# Patient Record
Sex: FEMALE | Race: WHITE | NOT HISPANIC OR LATINO | Employment: FULL TIME | URBAN - METROPOLITAN AREA
[De-identification: names, ages, dates, MRNs, and addresses within clinical notes are randomized per-mention and may not be internally consistent; named-entity substitution may affect disease eponyms.]

---

## 2018-05-17 RX ORDER — OMEPRAZOLE 40 MG/1
40 CAPSULE, DELAYED RELEASE ORAL DAILY
COMMUNITY
End: 2018-05-21 | Stop reason: ALTCHOICE

## 2018-05-17 RX ORDER — SPIRONOLACTONE 100 MG/1
100 TABLET, FILM COATED ORAL DAILY
COMMUNITY
End: 2018-05-21 | Stop reason: SDUPTHER

## 2018-05-17 RX ORDER — ESCITALOPRAM OXALATE 10 MG/1
10 TABLET ORAL DAILY
COMMUNITY
End: 2018-05-21 | Stop reason: SDUPTHER

## 2018-05-17 RX ORDER — VALSARTAN AND HYDROCHLOROTHIAZIDE 320; 25 MG/1; MG/1
1 TABLET, FILM COATED ORAL DAILY
COMMUNITY
End: 2018-05-21 | Stop reason: SDUPTHER

## 2018-05-17 RX ORDER — ZOLPIDEM TARTRATE 10 MG/1
5 TABLET ORAL NIGHTLY PRN
COMMUNITY
End: 2018-08-14 | Stop reason: SDUPTHER

## 2018-05-17 RX ORDER — PROGESTERONE 200 MG/1
200 CAPSULE ORAL DAILY
COMMUNITY
End: 2019-01-15

## 2018-05-17 RX ORDER — CETIRIZINE HYDROCHLORIDE 10 MG/1
10 TABLET ORAL DAILY
COMMUNITY

## 2018-05-21 ENCOUNTER — OFFICE VISIT (OUTPATIENT)
Dept: INTERNAL MEDICINE | Facility: CLINIC | Age: 63
End: 2018-05-21
Payer: COMMERCIAL

## 2018-05-21 VITALS
HEART RATE: 78 BPM | TEMPERATURE: 98 F | HEIGHT: 64 IN | BODY MASS INDEX: 31.92 KG/M2 | WEIGHT: 187 LBS | SYSTOLIC BLOOD PRESSURE: 146 MMHG | OXYGEN SATURATION: 98 % | DIASTOLIC BLOOD PRESSURE: 82 MMHG

## 2018-05-21 DIAGNOSIS — G47.00 INSOMNIA, UNSPECIFIED TYPE: ICD-10-CM

## 2018-05-21 DIAGNOSIS — M54.50 LEFT-SIDED LOW BACK PAIN WITHOUT SCIATICA, UNSPECIFIED CHRONICITY: ICD-10-CM

## 2018-05-21 DIAGNOSIS — F32.A DEPRESSION, UNSPECIFIED DEPRESSION TYPE: ICD-10-CM

## 2018-05-21 DIAGNOSIS — M10.9 ACUTE GOUT INVOLVING TOE OF RIGHT FOOT, UNSPECIFIED CAUSE: ICD-10-CM

## 2018-05-21 DIAGNOSIS — K21.9 GASTROESOPHAGEAL REFLUX DISEASE WITHOUT ESOPHAGITIS: ICD-10-CM

## 2018-05-21 DIAGNOSIS — Z11.59 NEED FOR HEPATITIS C SCREENING TEST: ICD-10-CM

## 2018-05-21 DIAGNOSIS — I10 ESSENTIAL HYPERTENSION: Primary | ICD-10-CM

## 2018-05-21 PROBLEM — E87.5 HYPERKALEMIA: Status: RESOLVED | Noted: 2018-05-21 | Resolved: 2018-05-21

## 2018-05-21 PROCEDURE — 96372 THER/PROPH/DIAG INJ SC/IM: CPT | Mod: ,,, | Performed by: INTERNAL MEDICINE

## 2018-05-21 PROCEDURE — 99214 OFFICE O/P EST MOD 30 MIN: CPT | Mod: 25,,, | Performed by: INTERNAL MEDICINE

## 2018-05-21 RX ORDER — METHYLPREDNISOLONE ACETATE 80 MG/ML
80 INJECTION, SUSPENSION INTRA-ARTICULAR; INTRALESIONAL; INTRAMUSCULAR; SOFT TISSUE
Status: COMPLETED | OUTPATIENT
Start: 2018-05-21 | End: 2018-05-21

## 2018-05-21 RX ORDER — VALSARTAN AND HYDROCHLOROTHIAZIDE 320; 25 MG/1; MG/1
1 TABLET, FILM COATED ORAL DAILY
Qty: 90 TABLET | Refills: 1 | Status: SHIPPED | OUTPATIENT
Start: 2018-05-21 | End: 2018-08-14 | Stop reason: SDUPTHER

## 2018-05-21 RX ORDER — AMLODIPINE BESYLATE 5 MG/1
5 TABLET ORAL DAILY
Qty: 90 TABLET | Refills: 1 | Status: SHIPPED | OUTPATIENT
Start: 2018-05-21 | End: 2018-06-12 | Stop reason: SDUPTHER

## 2018-05-21 RX ORDER — NAPROXEN 500 MG/1
500 TABLET ORAL 2 TIMES DAILY
Qty: 60 TABLET | Refills: 1 | Status: SHIPPED | OUTPATIENT
Start: 2018-05-21 | End: 2018-10-16

## 2018-05-21 RX ORDER — ESCITALOPRAM OXALATE 10 MG/1
10 TABLET ORAL DAILY
Qty: 90 TABLET | Refills: 1 | Status: SHIPPED | OUTPATIENT
Start: 2018-05-21 | End: 2018-08-14 | Stop reason: SDUPTHER

## 2018-05-21 RX ORDER — SPIRONOLACTONE 100 MG/1
100 TABLET, FILM COATED ORAL DAILY
Qty: 90 TABLET | Refills: 1 | Status: SHIPPED | OUTPATIENT
Start: 2018-05-21 | End: 2018-06-12

## 2018-05-21 RX ORDER — COLCHICINE 0.6 MG/1
TABLET ORAL
Qty: 30 TABLET | Refills: 1 | Status: SHIPPED | OUTPATIENT
Start: 2018-05-21 | End: 2018-08-14

## 2018-05-21 RX ADMIN — METHYLPREDNISOLONE ACETATE 80 MG: 80 INJECTION, SUSPENSION INTRA-ARTICULAR; INTRALESIONAL; INTRAMUSCULAR; SOFT TISSUE at 02:05

## 2018-05-21 NOTE — PATIENT INSTRUCTIONS
Gout    Gout is an inflammation of a joint due to a build-up of gout crystals in the joint fluid. This occurs when there is an excess of uric acid (a normal waste product) in the body. Uric acid builds up in the body when the kidneys are unable to filter enough of it from the blood. This may occur with age. It is also associated with kidney disease. Gout occurs more often in people with obesity, diabetes, high blood pressure, or high levels of fats in the blood. It may run in families. Gout tends to come and go. A flare up of gout is called an attack. Drinking alcohol or eating certain foods (such as shellfish or foods with additives such as high-fructose corn syrup) may increase uric acid levels in the blood and cause a gout attack.  During a gout attack, the affected joint may become a hot, red, swollen and painful. If you have had one attack of gout, you are likely to have another. An attack of gout can be treated with medicine. If these attacks become frequent, a daily medicine may be prescribed to help the kidneys remove uric acid from the body.  Home care  During a gout attack:  · Rest painful joints. If gout affects the joints of your foot or leg, you may want to use crutches for the first few days to keep from bearing weight on the affected joint.  · When sitting or lying down, raise the painful joint to a level higher than your heart.  · Apply an ice pack (ice cubes in a plastic bag wrapped in a thin towel) over the injured area for 20 minutes every 1 to 2 hours the first day for pain relief. Continue this 3 to 4 times a day for swelling and pain.  · Avoid alcohol and foods listed below (see Preventing attacks) during a gout attack. Drink extra fluid to help flush the uric acid through your kidneys.  · If you were prescribed a medicine to treat gout, take it as your healthcare provider has instructed. Don't skip doses.  · Take anti-inflammatory medicine as directed.   · If pain medicines have been  prescribed, take them exactly as directed.    Preventing attacks  · Minimize or avoid alcohol use. Excess alcohol intake can cause a gout attack.  · Limit these foods and beverages:  ¨ Organ meats, such as kidneys and liver  ¨ Certain seafoods (anchovies, sardines, shrimp, scallops, herring, mackerel)  ¨ Wild game, meat extracts and meat gravies  ¨ Foods and beverages sweetened with high-fructose corn syrup, such as sodas  · Eat a healthy diet including low-fat and nonfat dairy, whole grains, and vegetables.  · If you are overweight, talk to your healthcare provider about a weight reduction plan. Avoid fasting or extreme low calorie diets (less than 900 calories per day). This will increase uric acid levels in the body.  · If you have diabetes or high blood pressure, work with your doctor to manage these conditions.  · Protect the joint from injury. Trauma can trigger a gout attack.  Follow-up care  Follow up with your healthcare provider, or as advised.   When to seek medical advice  Call your healthcare provider if you have any of the following:  · Fever over 100.4°F (38.ºC) with worsening joint pain  · Increasing redness around the joint  · Pain developing in another joint  · Repeated vomiting, abdominal pain, or blood in the vomit or stool (black or red color)  Date Last Reviewed: 3/1/2017  © 3951-1643 [employer]. [address] [place] [address], [place] [address]. All rights reserved. This information is not intended as a substitute for professional medical care. Always follow your healthcare professional's instructions.        Treating Gout Attacks     Raising the joint above the level of your heart can help reduce gout symptoms.     Gout is a disease that affects the joints. It is caused by excess uric acid in your blood stream that may lead to crystals forming in your joints. Left untreated, it can lead to painful foot and joint deformities and even kidney problems. But, by treating gout early, you can  relieve pain and help prevent future problems. Gout can usually be treated with medication and proper diet. In severe cases, surgery may be needed.  Gout attacks are painful and often happen more than once. Taking medications may reduce pain and prevent attacks in the future. There are also some things you can do at home to relieve symptoms.  Medications for gout  Your healthcare provider may prescribe a daily medication to reduce levels of uric acid. Reducing your uric acid levels may help prevent gout attacks. Allopurinol is one commonly used medication taken daily to reduce uric acid levels. Other medications can help relieve pain and swelling during an acute attack. Medicines such as NSAIDs (nonsteroidal anti-inflammatory medicines), steroids, and colchicine may be prescribed for intermittent use to relieve an acute gout attack. Be sure to take your medication as directed.  What you can do  Below are some things you can do at home to relieve gout symptoms. Your healthcare provider may have other tips.  · Rest the painful joint as much as you can.  · Raise the painful joint so it is at a level higher than your heart.  · Use ice for 10 minutes every 1-2 hours as possible.  How can I prevent gout?  With a little effort, you may be able to prevent gout attacks in the future. Here are some things you can do:  · Avoid foods high in purines  ¨ Certain meats (red meat, processed meat, turkey)  ¨ Organ meats (kidney, liver, sweetbread)  ¨ Shellfish (lobster, crab, shrimp, scallop, mussel)  ¨ Certain fish (anchovy, sardine, herring, mackerel)  · Take any medications prescribed by your healthcare provider.  · Lose weight if you need to.  · Reduce high fructose corn syrup in meals and drinks.  · Reduce or eliminate consumption of alcohol, particularly beer, but also red wine and spirits.  · Control blood pressure, diabetes, and cholesterol.  · Drink plenty of water to help flush uric acid from your body.  Date Last  Reviewed: 2/1/2016  © 2041-4886 [employer]. [address] [place] [address], [place] [address]. All rights reserved. This information is not intended as a substitute for professional medical care. Always follow your healthcare professional's instructions.        Gout Diet  Gout is a painful condition caused by an excess of uric acid, a waste product made by the body. Uric acid forms crystals that collect in the joints. The immune response to these crystals brings on symptoms of joint pain and swelling. This is called a gout attack. Often, medications and diet changes are combined to manage gout. Below are some guidelines for changing your diet to help you manage gout and prevent attacks. Your health care provider will help you determine the best eating plan for you.     Eating to manage gout  Weight loss for those who are overweight may help reduce gout attacks.  Eat less of these foods  Eating too many foods containing purines may raise the levels of uric acid in your body. This raises your risk for a gout attack. Try to limit these foods and drinks:  · Alcohol, such as beer and red wine. You may be told to avoid alcohol completely.  · Soft drinks that contain sugar or high fructose corn syrup  · Certain fish, including anchovies, sardines, fish eggs, and herring  · Shellfish  · Certain meats, such as red meat, hot dogs, luncheon meats, and turkey  · Organ meats, such as liver, kidneys, and sweetbreads  · Legumes, such as dried beans and peas  · Other high fat foods such as gravy, whole milk, and high fat cheeses  · Vegetables such as asparagus, cauliflower, spinach, and mushrooms used to be thought to contribute to an increased risk for a gout attack, but recent studies show that high purine vegetables don't increase the risk for a gout attack.  Eat more of these foods  Other foods may be helpful for people with gout. Add some of these foods to your diet:  · Cherries contain chemicals that may lower uric  acid.  · Omega fatty acids. These are found in some fatty fish such as salmon, certain oils (flax, olive, or nut), and nuts themselves. Omega fatty acids may help prevent inflammation due to gout.  · Dairy products that are low-fat or fat-free, such as cheese and yogurt  · Complex carbohydrate foods, including whole grains, brown rice, oats, and beans  · Coffee, in moderation  · Water, approximately 64 ounces per day  Follow-up care  Follow up with your healthcare provider as advised.  When to seek medical advice  Call your healthcare provider right away if any of these occur:  · Return of gout symptoms, usually at night:  · Severe pain, swelling, and heat in a joint, especially the base of the big toe  · Affected joint is hard to move  · Skin of the affected joint is purple or red  · Fever of 100.4°F (38°C) or higher  · Pain that doesn't get better even with prescribed medicine   Date Last Reviewed: 1/12/2016  © 3407-2105 [employer] [address] [place] [address], [place] [address]. All rights reserved. This information is not intended as a substitute for professional medical care. Always follow your healthcare professional's instructions.

## 2018-05-21 NOTE — PROGRESS NOTES
"Subjective:       Patient ID: Kayla Piper is a 62 y.o. female.    Chief Complaint: Back Pain; Hypertension; Foot Pain (great toe on right foot hurts and swells up); and Hand Pain (bilateral hand pain)    Here to establish care and follow up at new location.  Recently had some labs done with Dr. Keene.      Back Pain   This is a new problem. The current episode started more than 1 month ago. The problem has been gradually worsening since onset. The pain is present in the lumbar spine. The quality of the pain is described as burning. Radiates to: left hip. The pain is at a severity of 8/10. The symptoms are aggravated by standing, twisting and bending. Pertinent negatives include no abdominal pain, bladder incontinence, bowel incontinence, chest pain, dysuria, fever, headaches, leg pain, numbness, paresthesias, pelvic pain, perianal numbness, tingling, weakness or weight loss. (Sometimes gets a "catch" in her back which almost makes her go to her knees.) Risk factors: prior h/o surgery on neck for DDD. She has tried NSAIDs (narcotics left over from last surgery) for the symptoms. The treatment provided moderate (takes meds mostly to help her sleep) relief.   Hypertension   This is a chronic problem. The problem is uncontrolled (monitors at home and typically upper 130s-140s there also). Associated symptoms include neck pain. Pertinent negatives include no chest pain, headaches, palpitations or shortness of breath. Agents associated with hypertension include estrogens and thyroid hormones. Risk factors for coronary artery disease include dyslipidemia, obesity and post-menopausal state. Past treatments include diuretics and angiotensin blockers. Identifiable causes of hypertension include a thyroid problem.   Foot Pain   This is a new (right great toe) problem. The current episode started 1 to 4 weeks ago. The problem has been waxing and waning. Associated symptoms include arthralgias (hand pain), joint swelling, " myalgias and neck pain. Pertinent negatives include no abdominal pain, chest pain, chills, congestion, coughing, fatigue, fever, headaches, nausea, numbness, rash, vomiting or weakness. Nothing aggravates the symptoms. She has tried nothing for the symptoms. The treatment provided mild relief.   Thyroid Problem   Presents for follow-up visit. Symptoms include weight gain (10-15 pounds in the last year). Patient reports no anxiety, cold intolerance, constipation, depressed mood (controlled with meds), diarrhea, dry skin, fatigue, hair loss, heat intolerance, menstrual problem, nail problem, palpitations, tremors or weight loss.     Review of Systems   Constitutional: Positive for weight gain (10-15 pounds in the last year). Negative for chills, fatigue, fever, unexpected weight change and weight loss.   HENT: Negative for congestion, hearing loss, postnasal drip, rhinorrhea, trouble swallowing and voice change.    Eyes: Negative for photophobia and visual disturbance.   Respiratory: Negative for apnea, cough, choking, chest tightness, shortness of breath and wheezing.    Cardiovascular: Negative for chest pain, palpitations and leg swelling.   Gastrointestinal: Negative for abdominal pain, blood in stool, bowel incontinence, constipation, diarrhea, nausea, rectal pain and vomiting.   Endocrine: Negative for cold intolerance, heat intolerance, polydipsia and polyuria.   Genitourinary: Negative for bladder incontinence, decreased urine volume, difficulty urinating, dysuria, frequency (s), genital sores, hematuria, menstrual problem, pelvic pain, urgency, vaginal bleeding and vaginal discharge.   Musculoskeletal: Positive for arthralgias (hand pain), back pain, joint swelling, myalgias, neck pain and neck stiffness. Negative for gait problem.   Skin: Negative for color change, rash and wound.   Allergic/Immunologic: Negative for environmental allergies and food allergies.   Neurological: Negative for dizziness,  tingling, tremors, seizures, syncope, facial asymmetry, speech difficulty, weakness, light-headedness, numbness, headaches and paresthesias.   Hematological: Negative for adenopathy. Does not bruise/bleed easily.   Psychiatric/Behavioral: Positive for sleep disturbance. Negative for confusion, hallucinations and suicidal ideas. The patient is not nervous/anxious.        Past Medical History:   Diagnosis Date    DDD (degenerative disc disease), cervical     Depression     Hyperkalemia     Hypertension     Insomnia     Rhinitis     Synovial cyst of popliteal space       Past Surgical History:   Procedure Laterality Date    ANTERIOR CERVICAL DISCECTOMY W/ FUSION      APPENDECTOMY      COLONOSCOPY  2013    KNEE SURGERY Right        Family History   Problem Relation Age of Onset    Hypertension Mother     Coronary artery disease Mother     Lung cancer Father     Hypertension Sister        Social History     Social History    Marital status:      Spouse name: N/A    Number of children: N/A    Years of education: N/A     Occupational History          Social History Main Topics    Smoking status: Former Smoker    Smokeless tobacco: Never Used    Alcohol use Yes      Comment: occasional    Drug use: No    Sexual activity: Yes     Partners: Male     Birth control/ protection: None      Comment:      Other Topics Concern    None     Social History Narrative    Live with        Current Outpatient Prescriptions   Medication Sig Dispense Refill    cetirizine (ZYRTEC) 10 MG tablet Take 10 mg by mouth once daily.      escitalopram oxalate (LEXAPRO) 10 MG tablet Take 1 tablet (10 mg total) by mouth once daily. 90 tablet 1    progesterone (PROMETRIUM) 200 MG capsule Take 200 mg by mouth once daily.      spironolactone (ALDACTONE) 100 MG tablet Take 1 tablet (100 mg total) by mouth once daily. 90 tablet 1    thyroid, pork, (NATURE-THROID) 65 mg Tab Take 1/2 tablet 2  "times daily 90 tablet 1    valsartan-hydrochlorothiazide (DIOVAN-HCT) 320-25 mg per tablet Take 1 tablet by mouth once daily. 90 tablet 1    zolpidem (AMBIEN) 10 mg Tab Take 5 mg by mouth nightly as needed.      amLODIPine (NORVASC) 5 MG tablet Take 1 tablet (5 mg total) by mouth once daily. 90 tablet 1    colchicine 0.6 mg tablet Take 2 tablets initially followed by one tablet two hors later and then one tablet daily 30 tablet 1    naproxen (NAPROSYN) 500 MG tablet Take 1 tablet (500 mg total) by mouth 2 (two) times daily. 60 tablet 1    ranitidine (ZANTAC) 150 MG tablet Take 1 tablet (150 mg total) by mouth 2 (two) times daily. 60 tablet 1     Current Facility-Administered Medications   Medication Dose Route Frequency Provider Last Rate Last Dose    methylPREDNISolone acetate injection 80 mg  80 mg Intramuscular 1 time in Clinic/HOD Theron Jimenez Jr., MD           Review of patient's allergies indicates:  No Known Allergies  Objective:    HPI     Foot Pain    Additional comments: great toe on right foot hurts and swells up           Hand Pain    Additional comments: bilateral hand pain       Last edited by Devendra Aguirre MA on 5/21/2018  2:14 PM. (History)      Blood pressure (!) 146/82, pulse 78, temperature 97.8 °F (36.6 °C), temperature source Temporal, height 5' 4" (1.626 m), weight 84.8 kg (187 lb), SpO2 98 %. Body mass index is 32.1 kg/m².   Physical Exam   Constitutional: She appears well-developed. No distress.   Obese     HENT:   Nose: Nose normal.   Mouth/Throat: Oropharynx is clear and moist.   Eyes: Conjunctivae are normal. Right eye exhibits no discharge. Left eye exhibits no discharge. No scleral icterus.   Neck: Carotid bruit is not present.   Cardiovascular: Normal rate, regular rhythm and normal heart sounds.    No murmur heard.  Pulses:       Dorsalis pedis pulses are 2+ on the right side, and 2+ on the left side.   Pulmonary/Chest: Effort normal and breath sounds normal. No " respiratory distress. She has no decreased breath sounds. She has no wheezes. She has no rhonchi. She has no rales.   Abdominal: Soft. She exhibits no distension. There is no tenderness. There is no rebound and no guarding.   Musculoskeletal: She exhibits no edema.        Right foot: There is tenderness (first MTP joint with mild erythema).   Neurological: She is alert. She displays no tremor.   Skin: Skin is warm and dry.   Psychiatric: She has a normal mood and affect. Her speech is normal.   Nursing note and vitals reviewed.          Assessment:       1. Essential hypertension    2. Acute gout involving toe of right foot, unspecified cause    3. Depression, unspecified depression type    4. Insomnia, unspecified type    5. Left-sided low back pain without sciatica, unspecified chronicity    6. Gastroesophageal reflux disease without esophagitis    7. Need for hepatitis C screening test        Plan:       Kayla was seen today for back pain, hypertension, foot pain and hand pain.    Diagnoses and all orders for this visit:    Essential hypertension  -     spironolactone (ALDACTONE) 100 MG tablet; Take 1 tablet (100 mg total) by mouth once daily.  -     valsartan-hydrochlorothiazide (DIOVAN-HCT) 320-25 mg per tablet; Take 1 tablet by mouth once daily.  -     amLODIPine (NORVASC) 5 MG tablet; Take 1 tablet (5 mg total) by mouth once daily.    Acute gout involving toe of right foot, unspecified cause  -     Uric acid; Future  -     colchicine 0.6 mg tablet; Take 2 tablets initially followed by one tablet two hors later and then one tablet daily  -     methylPREDNISolone acetate injection 80 mg; Inject 1 mL (80 mg total) into the muscle one time.  -     naproxen (NAPROSYN) 500 MG tablet; Take 1 tablet (500 mg total) by mouth 2 (two) times daily.  -     Uric acid    Depression, unspecified depression type  -     escitalopram oxalate (LEXAPRO) 10 MG tablet; Take 1 tablet (10 mg total) by mouth once daily.    Insomnia,  unspecified type    Left-sided low back pain without sciatica, unspecified chronicity  Comments:  She is followed by several of the doctors at [facility].  Recommend she make f/u with them for further evaluation.     Gastroesophageal reflux disease without esophagitis  Comments:  Hasn't tried H2 blocker recently;  try for a month.  Orders:  -     ranitidine (ZANTAC) 150 MG tablet; Take 1 tablet (150 mg total) by mouth 2 (two) times daily.    Need for hepatitis C screening test  -     Hepatitis C antibody; Future  -     Hepatitis C antibody    Other orders  -     thyroid, pork, (NATURE-THROID) 65 mg Tab; Take 1/2 tablet 2 times daily

## 2018-05-23 ENCOUNTER — TELEPHONE (OUTPATIENT)
Dept: INTERNAL MEDICINE | Facility: CLINIC | Age: 63
End: 2018-05-23

## 2018-05-23 DIAGNOSIS — M10.9 ACUTE GOUT INVOLVING TOE OF RIGHT FOOT, UNSPECIFIED CAUSE: Primary | ICD-10-CM

## 2018-05-23 LAB
HCV AB S/CO SERPL IA: <0.1 S/CO RATIO (ref 0–0.9)
URATE SERPL-MCNC: 9.7 MG/DL (ref 2.5–7.1)

## 2018-05-23 RX ORDER — ALLOPURINOL 300 MG/1
300 TABLET ORAL DAILY
Qty: 90 TABLET | Refills: 1 | Status: SHIPPED | OUTPATIENT
Start: 2018-05-23 | End: 2018-08-14 | Stop reason: SDUPTHER

## 2018-05-23 NOTE — TELEPHONE ENCOUNTER
----- Message from Theron Jimenez Jr., MD sent at 5/23/2018  4:24 PM CDT -----  Please call the patient regarding her abnormal result.  Labs c/w gout as I suspected.  There is a medication to reduce the risk of future flares but we shouldn't start it during an acute flare.  I am going to send the Rx to pharmacy; start once pain resolves.

## 2018-06-12 ENCOUNTER — OFFICE VISIT (OUTPATIENT)
Dept: INTERNAL MEDICINE | Facility: CLINIC | Age: 63
End: 2018-06-12
Payer: COMMERCIAL

## 2018-06-12 VITALS
TEMPERATURE: 98 F | DIASTOLIC BLOOD PRESSURE: 80 MMHG | SYSTOLIC BLOOD PRESSURE: 140 MMHG | WEIGHT: 186 LBS | HEART RATE: 75 BPM | OXYGEN SATURATION: 98 % | HEIGHT: 64 IN | BODY MASS INDEX: 31.76 KG/M2

## 2018-06-12 DIAGNOSIS — N28.9 ACUTE RENAL INSUFFICIENCY: Primary | ICD-10-CM

## 2018-06-12 DIAGNOSIS — I10 ESSENTIAL HYPERTENSION: ICD-10-CM

## 2018-06-12 PROCEDURE — 3079F DIAST BP 80-89 MM HG: CPT | Mod: ,,, | Performed by: INTERNAL MEDICINE

## 2018-06-12 PROCEDURE — 3008F BODY MASS INDEX DOCD: CPT | Mod: ,,, | Performed by: INTERNAL MEDICINE

## 2018-06-12 PROCEDURE — 99212 OFFICE O/P EST SF 10 MIN: CPT | Mod: ,,, | Performed by: INTERNAL MEDICINE

## 2018-06-12 PROCEDURE — 3077F SYST BP >= 140 MM HG: CPT | Mod: ,,, | Performed by: INTERNAL MEDICINE

## 2018-06-12 RX ORDER — AMLODIPINE BESYLATE 10 MG/1
5 TABLET ORAL DAILY
Qty: 90 TABLET | Refills: 1 | Status: SHIPPED | OUTPATIENT
Start: 2018-06-12 | End: 2018-08-14 | Stop reason: SDUPTHER

## 2018-06-12 NOTE — PROGRESS NOTES
Subjective:       Patient ID: Kayla Piper is a 62 y.o. female.    Chief Complaint: Follow-up (followup on labs done byDr. Keene)    Here to review some recent labs done by her GYN.  She is on hormone replacement pellets and gets labs about every 3-4 months.  Dr. Keene told her that she noted a difference in kidney function and sent her here for me to review.  Labs she brought with her show a creatinine of 1.27 and eGFR of 46.  She doesn't have the set prior to that.  I have labs in her old chart from about a year ago when creatinine was 1.11.  Has been on naproxen since last visit when she had an acute gout attack.  GYN stopped aldactone because potassium was also a little high.      Review of Systems   Constitutional: Negative for chills, fatigue, fever and unexpected weight change.   HENT: Negative for congestion, hearing loss, postnasal drip, rhinorrhea, trouble swallowing and voice change.    Eyes: Negative for photophobia and visual disturbance.   Respiratory: Negative for apnea, cough, choking, chest tightness, shortness of breath and wheezing.    Cardiovascular: Negative for chest pain, palpitations and leg swelling.   Gastrointestinal: Negative for abdominal pain, blood in stool, constipation, diarrhea, nausea, rectal pain and vomiting.   Endocrine: Negative for cold intolerance, heat intolerance, polydipsia and polyuria.   Genitourinary: Negative for decreased urine volume, difficulty urinating, dysuria, frequency (s), genital sores, hematuria, menstrual problem, pelvic pain, urgency, vaginal bleeding and vaginal discharge.   Musculoskeletal: Positive for arthralgias (hand pain), back pain, myalgias, neck pain and neck stiffness. Negative for gait problem and joint swelling.   Skin: Negative for color change, rash and wound.   Allergic/Immunologic: Negative for environmental allergies and food allergies.   Neurological: Negative for dizziness, tremors, seizures, syncope, facial asymmetry, speech  difficulty, weakness, light-headedness, numbness and headaches.   Hematological: Negative for adenopathy. Does not bruise/bleed easily.   Psychiatric/Behavioral: Positive for sleep disturbance. Negative for confusion, hallucinations and suicidal ideas. The patient is not nervous/anxious.        Past Medical History:   Diagnosis Date    DDD (degenerative disc disease), cervical     Depression     Hyperkalemia     Hypertension     Insomnia     Rhinitis     Synovial cyst of popliteal space       Past Surgical History:   Procedure Laterality Date    ANTERIOR CERVICAL DISCECTOMY W/ FUSION      APPENDECTOMY      COLONOSCOPY  2013    KNEE SURGERY Right        Family History   Problem Relation Age of Onset    Hypertension Mother     Coronary artery disease Mother     Lung cancer Father     Hypertension Sister        Social History     Social History    Marital status:      Spouse name: N/A    Number of children: N/A    Years of education: N/A     Occupational History          Social History Main Topics    Smoking status: Former Smoker    Smokeless tobacco: Never Used    Alcohol use Yes      Comment: occasional    Drug use: No    Sexual activity: Yes     Partners: Male     Birth control/ protection: None      Comment:      Other Topics Concern    None     Social History Narrative    Live with        Current Outpatient Prescriptions   Medication Sig Dispense Refill    allopurinol (ZYLOPRIM) 300 MG tablet Take 1 tablet (300 mg total) by mouth once daily. 90 tablet 1    amLODIPine (NORVASC) 10 MG tablet Take 0.5 tablets (5 mg total) by mouth once daily. 90 tablet 1    cetirizine (ZYRTEC) 10 MG tablet Take 10 mg by mouth once daily.      colchicine 0.6 mg tablet Take 2 tablets initially followed by one tablet two hors later and then one tablet daily 30 tablet 1    escitalopram oxalate (LEXAPRO) 10 MG tablet Take 1 tablet (10 mg total) by mouth once daily. 90 tablet  "1    naproxen (NAPROSYN) 500 MG tablet Take 1 tablet (500 mg total) by mouth 2 (two) times daily. 60 tablet 1    progesterone (PROMETRIUM) 200 MG capsule Take 200 mg by mouth once daily.      ranitidine (ZANTAC) 150 MG tablet Take 1 tablet (150 mg total) by mouth 2 (two) times daily. 60 tablet 1    thyroid, pork, (NATURE-THROID) 65 mg Tab Take 1/2 tablet 2 times daily 90 tablet 1    valsartan-hydrochlorothiazide (DIOVAN-HCT) 320-25 mg per tablet Take 1 tablet by mouth once daily. 90 tablet 1    zolpidem (AMBIEN) 10 mg Tab Take 5 mg by mouth nightly as needed.       No current facility-administered medications for this visit.        Review of patient's allergies indicates:  No Known Allergies  Objective:    HPI     Follow-up    Additional comments: followup on labs done byDr. Keene       Last edited by Theron Jimenez Jr., MD on 6/12/2018  4:44 PM. (History)        Blood pressure (!) 140/80, pulse 75, temperature 97.8 °F (36.6 °C), temperature source Temporal, height 5' 4" (1.626 m), weight 84.4 kg (186 lb), SpO2 98 %. Body mass index is 31.93 kg/m².   Physical Exam   Constitutional: She appears well-developed.   Cardiovascular: Normal rate, regular rhythm and normal heart sounds.    Pulmonary/Chest: Effort normal and breath sounds normal.   Neurological: She is alert.   Nursing note and vitals reviewed.          Assessment:       1. Acute renal insufficiency    2. Essential hypertension        Plan:       Kayla was seen today for follow-up.    Diagnoses and all orders for this visit:    Acute renal insufficiency  Comments:  Hold naproxen for now.  Maintatin hydration, control BP.  Recheck labs in couple of weeks.  Orders:  -     Basic metabolic panel; Future  -     Basic metabolic panel    Essential hypertension  -     amLODIPine (NORVASC) 10 MG tablet; Take 0.5 tablets (5 mg total) by mouth once daily.         "

## 2018-06-29 ENCOUNTER — TELEPHONE (OUTPATIENT)
Dept: INTERNAL MEDICINE | Facility: CLINIC | Age: 63
End: 2018-06-29

## 2018-06-29 LAB
BUN SERPL-MCNC: 26 MG/DL (ref 8–27)
BUN/CREAT SERPL: 23 (ref 12–28)
CALCIUM SERPL-MCNC: 10.5 MG/DL (ref 8.7–10.3)
CHLORIDE SERPL-SCNC: 100 MMOL/L (ref 96–106)
CO2 SERPL-SCNC: 24 MMOL/L (ref 20–29)
CREAT SERPL-MCNC: 1.13 MG/DL (ref 0.57–1)
EGFR IF AFRICAN AMERICAN: 61 ML/MIN/1.73
EST. GFR  (NON AFRICAN AMERICAN): 53 ML/MIN/1.73
GLUCOSE SERPL-MCNC: 90 MG/DL (ref 65–99)
POTASSIUM SERPL-SCNC: 5 MMOL/L (ref 3.5–5.2)
SODIUM SERPL-SCNC: 140 MMOL/L (ref 134–144)

## 2018-08-14 ENCOUNTER — OFFICE VISIT (OUTPATIENT)
Dept: INTERNAL MEDICINE | Facility: CLINIC | Age: 63
End: 2018-08-14
Payer: COMMERCIAL

## 2018-08-14 VITALS
DIASTOLIC BLOOD PRESSURE: 80 MMHG | BODY MASS INDEX: 32.44 KG/M2 | HEART RATE: 84 BPM | HEIGHT: 64 IN | SYSTOLIC BLOOD PRESSURE: 136 MMHG | TEMPERATURE: 98 F | OXYGEN SATURATION: 97 % | WEIGHT: 190 LBS

## 2018-08-14 DIAGNOSIS — Z12.31 SCREENING MAMMOGRAM, ENCOUNTER FOR: ICD-10-CM

## 2018-08-14 DIAGNOSIS — F32.A DEPRESSION, UNSPECIFIED DEPRESSION TYPE: ICD-10-CM

## 2018-08-14 DIAGNOSIS — Z12.11 COLON CANCER SCREENING: ICD-10-CM

## 2018-08-14 DIAGNOSIS — I10 ESSENTIAL HYPERTENSION: ICD-10-CM

## 2018-08-14 DIAGNOSIS — I10 ESSENTIAL HYPERTENSION: Primary | ICD-10-CM

## 2018-08-14 DIAGNOSIS — R09.89 BRUIT OF RIGHT CAROTID ARTERY: ICD-10-CM

## 2018-08-14 PROCEDURE — 3075F SYST BP GE 130 - 139MM HG: CPT | Mod: ,,, | Performed by: INTERNAL MEDICINE

## 2018-08-14 PROCEDURE — 99213 OFFICE O/P EST LOW 20 MIN: CPT | Mod: ,,, | Performed by: INTERNAL MEDICINE

## 2018-08-14 PROCEDURE — 3079F DIAST BP 80-89 MM HG: CPT | Mod: ,,, | Performed by: INTERNAL MEDICINE

## 2018-08-14 PROCEDURE — 3008F BODY MASS INDEX DOCD: CPT | Mod: ,,, | Performed by: INTERNAL MEDICINE

## 2018-08-14 RX ORDER — VALSARTAN AND HYDROCHLOROTHIAZIDE 320; 25 MG/1; MG/1
1 TABLET, FILM COATED ORAL DAILY
Qty: 90 TABLET | Refills: 1 | Status: SHIPPED | OUTPATIENT
Start: 2018-08-14 | End: 2019-01-15 | Stop reason: SDUPTHER

## 2018-08-14 RX ORDER — ZOLPIDEM TARTRATE 10 MG/1
10 TABLET ORAL NIGHTLY PRN
Qty: 90 TABLET | Refills: 0 | Status: SHIPPED | OUTPATIENT
Start: 2018-08-14 | End: 2018-12-19 | Stop reason: SDUPTHER

## 2018-08-14 RX ORDER — ALLOPURINOL 300 MG/1
300 TABLET ORAL DAILY
Qty: 90 TABLET | Refills: 1 | Status: SHIPPED | OUTPATIENT
Start: 2018-08-14 | End: 2019-01-15 | Stop reason: SDUPTHER

## 2018-08-14 RX ORDER — ESCITALOPRAM OXALATE 10 MG/1
10 TABLET ORAL DAILY
Qty: 90 TABLET | Refills: 1 | Status: SHIPPED | OUTPATIENT
Start: 2018-08-14 | End: 2019-01-15 | Stop reason: SDUPTHER

## 2018-08-14 RX ORDER — AMLODIPINE BESYLATE 10 MG/1
10 TABLET ORAL DAILY
Qty: 90 TABLET | Refills: 1 | Status: SHIPPED | OUTPATIENT
Start: 2018-08-14 | End: 2019-01-15

## 2018-08-14 NOTE — PROGRESS NOTES
Subjective:       Patient ID: Kayla Piper is a 62 y.o. female.    Chief Complaint: Hypertension (continuity of care)    Here to establish care and follow up at new location.  Recently had some labs done with Dr. Keene.      Hypertension   This is a chronic problem. The problem is uncontrolled (monitors at home and typically upper 130s there also). Pertinent negatives include no anxiety, chest pain, headaches, neck pain, palpitations, peripheral edema or shortness of breath. Agents associated with hypertension include estrogens and thyroid hormones. Risk factors for coronary artery disease include dyslipidemia, obesity and post-menopausal state. Past treatments include diuretics and angiotensin blockers (she checked and her valsartan was not affected by the recall). Identifiable causes of hypertension include a thyroid problem.     Review of Systems   Constitutional: Negative for chills, fatigue, fever, unexpected weight change, weight gain and weight loss.   HENT: Negative for congestion, hearing loss, postnasal drip, rhinorrhea, trouble swallowing and voice change.    Eyes: Negative for photophobia and visual disturbance.   Respiratory: Negative for apnea, cough, choking, chest tightness, shortness of breath and wheezing.    Cardiovascular: Negative for chest pain, palpitations and leg swelling.   Gastrointestinal: Negative for abdominal pain, blood in stool, bowel incontinence, constipation, diarrhea, nausea, rectal pain and vomiting.   Endocrine: Negative for cold intolerance, heat intolerance, polydipsia and polyuria.   Genitourinary: Negative for bladder incontinence, decreased urine volume, difficulty urinating, dysuria, frequency, genital sores, hematuria, menstrual problem, pelvic pain, urgency, vaginal bleeding and vaginal discharge.   Musculoskeletal: Negative for arthralgias, back pain, gait problem, joint swelling, myalgias, neck pain and neck stiffness.   Skin: Negative for color change, rash and  wound.   Allergic/Immunologic: Negative for environmental allergies and food allergies.   Neurological: Negative for dizziness, tingling, tremors, seizures, syncope, facial asymmetry, speech difficulty, weakness, light-headedness, numbness, headaches and paresthesias.   Hematological: Negative for adenopathy. Does not bruise/bleed easily.   Psychiatric/Behavioral: Negative for confusion, hallucinations, sleep disturbance and suicidal ideas. The patient is not nervous/anxious.        Past Medical History:   Diagnosis Date    DDD (degenerative disc disease), cervical     Depression     Hyperkalemia     Hypertension     Insomnia     Rhinitis     Synovial cyst of popliteal space       Past Surgical History:   Procedure Laterality Date    ANTERIOR CERVICAL DISCECTOMY W/ FUSION      APPENDECTOMY      COLONOSCOPY  2013    KNEE SURGERY Right        Family History   Problem Relation Age of Onset    Hypertension Mother     Coronary artery disease Mother     Lung cancer Father     Hypertension Sister        Social History     Socioeconomic History    Marital status:      Spouse name: None    Number of children: None    Years of education: None    Highest education level: None   Social Needs    Financial resource strain: None    Food insecurity - worry: None    Food insecurity - inability: None    Transportation needs - medical: None    Transportation needs - non-medical: None   Occupational History    Occupation:    Tobacco Use    Smoking status: Former Smoker    Smokeless tobacco: Never Used   Substance and Sexual Activity    Alcohol use: Yes     Comment: occasional    Drug use: No    Sexual activity: Yes     Partners: Male     Birth control/protection: None     Comment:    Other Topics Concern    None   Social History Narrative    Live with        Current Outpatient Medications   Medication Sig Dispense Refill    allopurinol (ZYLOPRIM) 300 MG tablet Take 1  "tablet (300 mg total) by mouth once daily. 90 tablet 1    amLODIPine (NORVASC) 10 MG tablet Take 1 tablet (10 mg total) by mouth once daily. 90 tablet 1    cetirizine (ZYRTEC) 10 MG tablet Take 10 mg by mouth once daily.      escitalopram oxalate (LEXAPRO) 10 MG tablet Take 1 tablet (10 mg total) by mouth once daily. 90 tablet 1    naproxen (NAPROSYN) 500 MG tablet Take 1 tablet (500 mg total) by mouth 2 (two) times daily. 60 tablet 1    progesterone (PROMETRIUM) 200 MG capsule Take 200 mg by mouth once daily.      ranitidine (ZANTAC) 150 MG tablet Take 1 tablet (150 mg total) by mouth 2 (two) times daily. 60 tablet 1    thyroid, pork, (NATURE-THROID) 65 mg Tab Take 1/2 tablet 2 times daily 90 tablet 1    valsartan-hydrochlorothiazide (DIOVAN-HCT) 320-25 mg per tablet Take 1 tablet by mouth once daily. 90 tablet 1    zolpidem (AMBIEN) 10 mg Tab Take 5 mg by mouth nightly as needed.       No current facility-administered medications for this visit.        Review of patient's allergies indicates:  No Known Allergies  Objective:    HPI     Hypertension      Additional comments: continuity of care          Last edited by Devendra Aguirre MA on 8/14/2018 10:20 AM. (History)      Blood pressure 136/80, pulse 84, temperature 97.7 °F (36.5 °C), temperature source Temporal, height 5' 4" (1.626 m), weight 86.2 kg (190 lb), SpO2 97 %. Body mass index is 32.61 kg/m².   Physical Exam   Constitutional: She appears well-developed. No distress.   HENT:   Nose: Nose normal.   Mouth/Throat: Oropharynx is clear and moist.   Eyes: Conjunctivae are normal. Right eye exhibits no discharge. Left eye exhibits no discharge. No scleral icterus.   Neck: Carotid bruit is present (right).   Cardiovascular: Normal rate, regular rhythm and normal heart sounds.   No murmur heard.  Pulmonary/Chest: Effort normal and breath sounds normal. No respiratory distress. She has no decreased breath sounds. She has no wheezes. She has no rhonchi. She " has no rales.   Abdominal: Soft. She exhibits no distension. There is no tenderness. There is no rebound and no guarding.   Musculoskeletal: She exhibits no edema.   Neurological: She is alert. She displays no tremor.   Skin: Skin is warm and dry.   Psychiatric: She has a normal mood and affect. Her speech is normal.   Nursing note and vitals reviewed.          Assessment:       1. Essential hypertension    2. Screening mammogram, encounter for    3. Colon cancer screening    4. Bruit of right carotid artery        Plan:       Kayla was seen today for hypertension.    Diagnoses and all orders for this visit:    Essential hypertension  -     amLODIPine (NORVASC) 10 MG tablet; Take 1 tablet (10 mg total) by mouth once daily.    Screening mammogram, encounter for  -     Mammo Digital Screening Bilat with CAD; Future    Colon cancer screening  -     Cologuard Screening (Multitarget Stool DNA)    Bruit of right carotid artery  -     US Carotid Bilateral; Future

## 2018-08-28 ENCOUNTER — TELEPHONE (OUTPATIENT)
Dept: FAMILY MEDICINE | Facility: CLINIC | Age: 63
End: 2018-08-28

## 2018-08-28 DIAGNOSIS — R92.8 ABNORMAL MAMMOGRAM OF LEFT BREAST: Primary | ICD-10-CM

## 2018-08-28 DIAGNOSIS — I65.21 CAROTID STENOSIS, RIGHT: ICD-10-CM

## 2018-08-28 NOTE — TELEPHONE ENCOUNTER
----- Message from Theron Jimenez Jr., MD sent at 8/28/2018  3:41 PM CDT -----  Let her know she has a significant blockage of the carotid artery on the right.  I want her to see a vascular surgeon to see if she meets criteria for surgery to reduce the risk of stroke.  Make sure she is taking an ASA daily.

## 2018-08-30 ENCOUNTER — TELEPHONE (OUTPATIENT)
Dept: ADMINISTRATIVE | Facility: OTHER | Age: 63
End: 2018-08-30

## 2018-08-30 NOTE — TELEPHONE ENCOUNTER
----- Message from Tricia Torres sent at 8/30/2018  2:25 PM CDT -----  Contact: pt  Pt needing to book an appt and has a referral, in the system please call the pt at [phone]

## 2018-08-31 ENCOUNTER — TELEPHONE (OUTPATIENT)
Dept: FAMILY MEDICINE | Facility: CLINIC | Age: 63
End: 2018-08-31

## 2018-09-05 DIAGNOSIS — I65.21 RIGHT CAVERNOUS CAROTID STENOSIS: Primary | ICD-10-CM

## 2018-09-12 ENCOUNTER — TELEPHONE (OUTPATIENT)
Dept: FAMILY MEDICINE | Facility: CLINIC | Age: 63
End: 2018-09-12

## 2018-09-12 NOTE — TELEPHONE ENCOUNTER
----- Message from Theron Jimenez Jr., MD sent at 9/11/2018  4:59 PM CDT -----  Follow up mammogram and ultrasound normal    ----- Message -----  From: Augustina Liz  Sent: 9/11/2018   3:35 PM  To: Theron Jimenez Jr., MD    Diagnostic mammogram 9/11/18

## 2018-10-05 ENCOUNTER — TELEPHONE (OUTPATIENT)
Dept: FAMILY MEDICINE | Facility: CLINIC | Age: 63
End: 2018-10-05

## 2018-10-11 ENCOUNTER — TELEPHONE (OUTPATIENT)
Dept: ADMINISTRATIVE | Facility: CLINIC | Age: 63
End: 2018-10-11

## 2018-10-11 NOTE — TELEPHONE ENCOUNTER
MED. REC. CALL: Pt returned call.  No questions or concerns at this time.  Reminded pt of upcoming appt on 10/17.

## 2018-10-16 ENCOUNTER — OFFICE VISIT (OUTPATIENT)
Dept: FAMILY MEDICINE | Facility: CLINIC | Age: 63
End: 2018-10-16
Payer: COMMERCIAL

## 2018-10-16 VITALS
HEART RATE: 78 BPM | TEMPERATURE: 98 F | DIASTOLIC BLOOD PRESSURE: 66 MMHG | OXYGEN SATURATION: 97 % | WEIGHT: 189 LBS | HEIGHT: 64 IN | SYSTOLIC BLOOD PRESSURE: 100 MMHG | BODY MASS INDEX: 32.27 KG/M2

## 2018-10-16 DIAGNOSIS — I65.23 CAROTID STENOSIS, ASYMPTOMATIC, BILATERAL: ICD-10-CM

## 2018-10-16 DIAGNOSIS — Z12.11 COLON CANCER SCREENING: ICD-10-CM

## 2018-10-16 DIAGNOSIS — I65.21 CAROTID STENOSIS, RIGHT: ICD-10-CM

## 2018-10-16 DIAGNOSIS — I10 ESSENTIAL HYPERTENSION: Primary | ICD-10-CM

## 2018-10-16 PROCEDURE — 99213 OFFICE O/P EST LOW 20 MIN: CPT | Mod: ,,, | Performed by: INTERNAL MEDICINE

## 2018-10-16 PROCEDURE — 3074F SYST BP LT 130 MM HG: CPT | Mod: ,,, | Performed by: INTERNAL MEDICINE

## 2018-10-16 PROCEDURE — 3078F DIAST BP <80 MM HG: CPT | Mod: ,,, | Performed by: INTERNAL MEDICINE

## 2018-10-16 PROCEDURE — 3008F BODY MASS INDEX DOCD: CPT | Mod: ,,, | Performed by: INTERNAL MEDICINE

## 2018-10-16 PROCEDURE — 1111F DSCHRG MED/CURRENT MED MERGE: CPT | Mod: ,,, | Performed by: INTERNAL MEDICINE

## 2018-10-16 RX ORDER — SULFAMETHOXAZOLE AND TRIMETHOPRIM 800; 160 MG/1; MG/1
1 TABLET ORAL 2 TIMES DAILY
Refills: 1 | COMMUNITY
Start: 2018-10-08 | End: 2019-01-15

## 2018-10-16 RX ORDER — ASPIRIN 325 MG
325 TABLET ORAL DAILY
COMMUNITY
End: 2023-09-25 | Stop reason: ALTCHOICE

## 2018-10-16 RX ORDER — ROSUVASTATIN CALCIUM 10 MG/1
10 TABLET, COATED ORAL DAILY
Qty: 90 TABLET | Refills: 1 | Status: SHIPPED | OUTPATIENT
Start: 2018-10-16 | End: 2019-01-15 | Stop reason: SDUPTHER

## 2018-10-16 NOTE — PROGRESS NOTES
Subjective:       Patient ID: Kayla Piper is a 63 y.o. female.    Chief Complaint: Follow-up (followup from surgery)    Here for hospital f/u s/p right CEA.  She was noted to have carotid bruit on exam and further workup revealed asymptomatic significant stenosis.  She underwent CEA to reduce risk of stroke.  BP was somewhat labile post op.  She has been monitoring at home since discharge and it has been running on the low side.  She was discharged on ASA 325mg, bactrim for 2 weeks but otherwise no changes in home medications on hospital discharge. She has had headaches since surgery; told it was a normal post op complication and would eventually improve.       Review of Systems   Constitutional: Negative for chills, fatigue, fever and unexpected weight change.   HENT: Negative for congestion, hearing loss, postnasal drip, rhinorrhea, trouble swallowing and voice change.    Eyes: Negative for photophobia and visual disturbance.   Respiratory: Negative for apnea, cough, choking, chest tightness, shortness of breath and wheezing.    Cardiovascular: Negative for chest pain, palpitations and leg swelling.   Gastrointestinal: Negative for abdominal pain, blood in stool, constipation, diarrhea, nausea, rectal pain and vomiting.   Endocrine: Negative for cold intolerance, heat intolerance, polydipsia and polyuria.   Genitourinary: Negative for decreased urine volume, difficulty urinating, dysuria, frequency, genital sores, hematuria, menstrual problem, pelvic pain, urgency, vaginal bleeding and vaginal discharge.   Musculoskeletal: Positive for arthralgias and neck pain. Negative for back pain, gait problem, joint swelling, myalgias and neck stiffness.   Skin: Negative for color change, rash and wound.   Allergic/Immunologic: Negative for environmental allergies and food allergies.   Neurological: Positive for headaches. Negative for dizziness, tremors, seizures, syncope, facial asymmetry, speech difficulty,  weakness, light-headedness and numbness.   Hematological: Negative for adenopathy. Does not bruise/bleed easily.   Psychiatric/Behavioral: Negative for confusion, hallucinations, sleep disturbance and suicidal ideas. The patient is not nervous/anxious.        Past Medical History:   Diagnosis Date    Carotid stenosis, right     DDD (degenerative disc disease), cervical     Depression     Hyperkalemia     Hypertension     Insomnia     Rhinitis     Synovial cyst of popliteal space       Past Surgical History:   Procedure Laterality Date    ANTERIOR CERVICAL DISCECTOMY W/ FUSION      APPENDECTOMY      CAROTID ENDARTERECTOMY Right 10/03/2018    COLONOSCOPY  2013    KNEE SURGERY Right        Family History   Problem Relation Age of Onset    Hypertension Mother     Coronary artery disease Mother     Lung cancer Father     Hypertension Sister        Social History     Socioeconomic History    Marital status:      Spouse name: None    Number of children: None    Years of education: None    Highest education level: None   Social Needs    Financial resource strain: None    Food insecurity - worry: None    Food insecurity - inability: None    Transportation needs - medical: None    Transportation needs - non-medical: None   Occupational History    Occupation:    Tobacco Use    Smoking status: Former Smoker    Smokeless tobacco: Never Used   Substance and Sexual Activity    Alcohol use: Yes     Comment: occasional    Drug use: No    Sexual activity: Yes     Partners: Male     Birth control/protection: None     Comment:    Other Topics Concern    None   Social History Narrative    Live with        Current Outpatient Medications   Medication Sig Dispense Refill    allopurinol (ZYLOPRIM) 300 MG tablet Take 1 tablet (300 mg total) by mouth once daily. 90 tablet 1    amLODIPine (NORVASC) 10 MG tablet Take 1 tablet (10 mg total) by mouth once daily. 90 tablet 1     "aspirin 325 MG tablet Take 325 mg by mouth once daily.      cetirizine (ZYRTEC) 10 MG tablet Take 10 mg by mouth once daily.      escitalopram oxalate (LEXAPRO) 10 MG tablet Take 1 tablet (10 mg total) by mouth once daily. 90 tablet 1    progesterone (PROMETRIUM) 200 MG capsule Take 200 mg by mouth once daily.      ranitidine (ZANTAC) 150 MG tablet Take 1 tablet (150 mg total) by mouth 2 (two) times daily. 60 tablet 1    thyroid, pork, (NATURE-THROID) 65 mg Tab Take 1/2 tablet 2 times daily 90 tablet 1    valsartan-hydrochlorothiazide (DIOVAN-HCT) 320-25 mg per tablet Take 1 tablet by mouth once daily. 90 tablet 1    zolpidem (AMBIEN) 10 mg Tab Take 1 tablet (10 mg total) by mouth nightly as needed. 90 tablet 0    rosuvastatin (CRESTOR) 10 MG tablet Take 1 tablet (10 mg total) by mouth once daily. 90 tablet 1    sulfamethoxazole-trimethoprim 800-160mg (BACTRIM DS) 800-160 mg Tab Take 1 tablet by mouth 2 (two) times daily.  1     No current facility-administered medications for this visit.        Review of patient's allergies indicates:  No Known Allergies  Objective:    HPI     Follow-up      Additional comments: followup from surgery          Last edited by Devendra Aguirre MA on 10/16/2018  9:58 AM. (History)      Blood pressure 100/66, pulse 78, temperature 97.8 °F (36.6 °C), temperature source Temporal, height 5' 4" (1.626 m), weight 85.7 kg (189 lb), SpO2 97 %. Body mass index is 32.44 kg/m².   Physical Exam   Constitutional: She appears well-developed. No distress.   Obese     HENT:   Nose: Nose normal.   Mouth/Throat: Oropharynx is clear and moist.   Eyes: Conjunctivae are normal. Right eye exhibits no discharge. Left eye exhibits no discharge. No scleral icterus.   Neck: Carotid bruit is not present.   Cardiovascular: Normal rate, regular rhythm and normal heart sounds.   No murmur heard.  Pulmonary/Chest: Effort normal and breath sounds normal. No respiratory distress. She has no decreased breath " sounds. She has no wheezes. She has no rhonchi. She has no rales.   Abdominal: Soft. She exhibits no distension. There is no tenderness. There is no rebound and no guarding.   Musculoskeletal: She exhibits no edema.   Neurological: She is alert. She displays no tremor.   Skin: Skin is warm and dry.   Psychiatric: She has a normal mood and affect. Her speech is normal.   Nursing note and vitals reviewed.          Assessment:       1. Essential hypertension    2. Carotid stenosis, asymptomatic, bilateral    3. Colon cancer screening        Plan:       Kayla was seen today for follow-up.    Diagnoses and all orders for this visit:    Essential hypertension  Comments:  Try holding amlodipine and monitor BP at home.  If runs high, will try 5mg  Orders:  -     Comprehensive metabolic panel; Future  -     Lipid panel; Future  -     Comprehensive metabolic panel  -     Lipid panel    Carotid stenosis, asymptomatic, bilateral  Comments:  s/p right CEA.  She also had currently nonsignificant stenosis on the left; U/S q 6 months.  Discussed risk of HRT.  Orders:  -     rosuvastatin (CRESTOR) 10 MG tablet; Take 1 tablet (10 mg total) by mouth once daily.    Colon cancer screening  -     Cologuard Screening (Multitarget Stool DNA); Future  -     Cologuard Screening (Multitarget Stool DNA)

## 2018-12-19 RX ORDER — ZOLPIDEM TARTRATE 10 MG/1
10 TABLET ORAL NIGHTLY PRN
Qty: 90 TABLET | Refills: 0 | Status: SHIPPED | OUTPATIENT
Start: 2018-12-19 | End: 2019-02-26 | Stop reason: SDUPTHER

## 2019-01-12 DIAGNOSIS — M10.9 ACUTE GOUT INVOLVING TOE OF RIGHT FOOT, UNSPECIFIED CAUSE: ICD-10-CM

## 2019-01-14 RX ORDER — NAPROXEN 500 MG/1
TABLET ORAL
Qty: 60 TABLET | Refills: 0 | Status: SHIPPED | OUTPATIENT
Start: 2019-01-14 | End: 2019-06-11 | Stop reason: SDUPTHER

## 2019-01-15 ENCOUNTER — OFFICE VISIT (OUTPATIENT)
Dept: FAMILY MEDICINE | Facility: CLINIC | Age: 64
End: 2019-01-15
Payer: COMMERCIAL

## 2019-01-15 VITALS
DIASTOLIC BLOOD PRESSURE: 100 MMHG | HEART RATE: 92 BPM | TEMPERATURE: 97 F | BODY MASS INDEX: 32.44 KG/M2 | HEIGHT: 64 IN | WEIGHT: 190 LBS | OXYGEN SATURATION: 98 % | SYSTOLIC BLOOD PRESSURE: 172 MMHG

## 2019-01-15 DIAGNOSIS — F32.A DEPRESSION, UNSPECIFIED DEPRESSION TYPE: ICD-10-CM

## 2019-01-15 DIAGNOSIS — K21.9 GASTROESOPHAGEAL REFLUX DISEASE WITHOUT ESOPHAGITIS: ICD-10-CM

## 2019-01-15 DIAGNOSIS — I10 ESSENTIAL HYPERTENSION: Primary | ICD-10-CM

## 2019-01-15 DIAGNOSIS — I65.23 CAROTID STENOSIS, ASYMPTOMATIC, BILATERAL: ICD-10-CM

## 2019-01-15 PROCEDURE — 99213 OFFICE O/P EST LOW 20 MIN: CPT | Mod: ,,, | Performed by: INTERNAL MEDICINE

## 2019-01-15 PROCEDURE — 99213 PR OFFICE/OUTPT VISIT, EST, LEVL III, 20-29 MIN: ICD-10-PCS | Mod: ,,, | Performed by: INTERNAL MEDICINE

## 2019-01-15 RX ORDER — VALSARTAN AND HYDROCHLOROTHIAZIDE 320; 25 MG/1; MG/1
1 TABLET, FILM COATED ORAL DAILY
Qty: 90 TABLET | Refills: 1 | Status: SHIPPED | OUTPATIENT
Start: 2019-01-15 | End: 2019-02-26 | Stop reason: SDUPTHER

## 2019-01-15 RX ORDER — ALLOPURINOL 300 MG/1
300 TABLET ORAL DAILY
Qty: 90 TABLET | Refills: 1 | Status: SHIPPED | OUTPATIENT
Start: 2019-01-15 | End: 2019-02-26 | Stop reason: SDUPTHER

## 2019-01-15 RX ORDER — ESCITALOPRAM OXALATE 10 MG/1
10 TABLET ORAL DAILY
Qty: 90 TABLET | Refills: 1 | Status: SHIPPED | OUTPATIENT
Start: 2019-01-15 | End: 2019-02-26 | Stop reason: SDUPTHER

## 2019-01-15 RX ORDER — ROSUVASTATIN CALCIUM 10 MG/1
10 TABLET, COATED ORAL DAILY
Qty: 90 TABLET | Refills: 1 | Status: SHIPPED | OUTPATIENT
Start: 2019-01-15 | End: 2019-02-26 | Stop reason: SDUPTHER

## 2019-01-15 RX ORDER — ZOLPIDEM TARTRATE 10 MG/1
10 TABLET ORAL NIGHTLY PRN
Qty: 90 TABLET | Refills: 0 | Status: CANCELLED | OUTPATIENT
Start: 2019-01-15

## 2019-01-15 RX ORDER — AMLODIPINE BESYLATE 10 MG/1
10 TABLET ORAL NIGHTLY
Qty: 90 TABLET | Refills: 1
Start: 2019-01-15 | End: 2019-02-26 | Stop reason: SDUPTHER

## 2019-01-15 NOTE — PROGRESS NOTES
Subjective:       Patient ID: Kayla Piper is a 63 y.o. female.    Chief Complaint: Hypertension (continuity of care) and Depression    Hypertension   This is a chronic problem. The problem has been waxing and waning since onset. The problem is controlled (monitors at home and has been up and down; sometimes it is 100s-110s/60s-70s and other times it is 130s-140s/ 80s-90s; not usually as high as it is in the office today). Pertinent negatives include no anxiety, chest pain, headaches, neck pain, palpitations, peripheral edema or shortness of breath. Agents associated with hypertension include estrogens and thyroid hormones (she has been having some sciatica and is hurting today which could be contributing). Risk factors for coronary artery disease include dyslipidemia, obesity and post-menopausal state. Past treatments include diuretics and angiotensin blockers. There are no compliance problems.  Identifiable causes of hypertension include a thyroid problem.     Review of Systems   Constitutional: Negative for chills, fatigue, fever and unexpected weight change.   HENT: Negative for congestion, hearing loss, postnasal drip, rhinorrhea, trouble swallowing and voice change.    Eyes: Negative for photophobia and visual disturbance.   Respiratory: Positive for apnea. Negative for cough, choking, chest tightness, shortness of breath and wheezing.    Cardiovascular: Negative for chest pain, palpitations and leg swelling.   Gastrointestinal: Negative for abdominal pain, blood in stool, constipation, diarrhea, nausea, rectal pain and vomiting.   Endocrine: Negative for cold intolerance, heat intolerance, polydipsia and polyuria.   Genitourinary: Negative for decreased urine volume, difficulty urinating, dysuria, frequency, genital sores, hematuria, menstrual problem, pelvic pain, urgency, vaginal bleeding and vaginal discharge.   Musculoskeletal: Negative for arthralgias, back pain, gait problem, joint swelling,  myalgias, neck pain and neck stiffness.   Skin: Negative for color change, rash and wound.   Allergic/Immunologic: Negative for environmental allergies and food allergies.   Neurological: Negative for dizziness, tremors, seizures, syncope, facial asymmetry, speech difficulty, weakness, light-headedness, numbness and headaches.   Hematological: Negative for adenopathy. Does not bruise/bleed easily.   Psychiatric/Behavioral: Negative for confusion, hallucinations, sleep disturbance and suicidal ideas. The patient is not nervous/anxious.        Past Medical History:   Diagnosis Date    Carotid stenosis, right     DDD (degenerative disc disease), cervical     Depression     Hyperkalemia     Hypertension     Insomnia     Rhinitis     Synovial cyst of popliteal space       Past Surgical History:   Procedure Laterality Date    ANTERIOR CERVICAL DISCECTOMY W/ FUSION      APPENDECTOMY      CAROTID ENDARTERECTOMY Right 10/03/2018    COLONOSCOPY  2013    KNEE SURGERY Right        Family History   Problem Relation Age of Onset    Hypertension Mother     Coronary artery disease Mother     Lung cancer Father     Hypertension Sister        Social History     Socioeconomic History    Marital status:      Spouse name: None    Number of children: None    Years of education: None    Highest education level: None   Social Needs    Financial resource strain: None    Food insecurity - worry: None    Food insecurity - inability: None    Transportation needs - medical: None    Transportation needs - non-medical: None   Occupational History    Occupation:    Tobacco Use    Smoking status: Former Smoker    Smokeless tobacco: Never Used   Substance and Sexual Activity    Alcohol use: Yes     Comment: occasional    Drug use: No    Sexual activity: Yes     Partners: Male     Birth control/protection: None     Comment:    Other Topics Concern    None   Social History Narrative    Live  "with        Current Outpatient Medications   Medication Sig Dispense Refill    allopurinol (ZYLOPRIM) 300 MG tablet Take 1 tablet (300 mg total) by mouth once daily. 90 tablet 1    aspirin 325 MG tablet Take 325 mg by mouth once daily.      cetirizine (ZYRTEC) 10 MG tablet Take 10 mg by mouth once daily.      escitalopram oxalate (LEXAPRO) 10 MG tablet Take 1 tablet (10 mg total) by mouth once daily. 90 tablet 1    naproxen (NAPROSYN) 500 MG tablet TAKE 1 TABLET(500 MG) BY MOUTH TWICE DAILY 60 tablet 0    ranitidine (ZANTAC) 150 MG tablet Take 1 tablet (150 mg total) by mouth 2 (two) times daily. 60 tablet 1    rosuvastatin (CRESTOR) 10 MG tablet Take 1 tablet (10 mg total) by mouth once daily. 90 tablet 1    valsartan-hydrochlorothiazide (DIOVAN-HCT) 320-25 mg per tablet Take 1 tablet by mouth once daily. 90 tablet 1    zolpidem (AMBIEN) 10 mg Tab Take 1 tablet (10 mg total) by mouth nightly as needed. 90 tablet 0    amLODIPine (NORVASC) 10 MG tablet Take 1 tablet (10 mg total) by mouth every evening. 90 tablet 1     No current facility-administered medications for this visit.        Review of patient's allergies indicates:  No Known Allergies  Objective:    HPI     Hypertension      Additional comments: continuity of care          Last edited by Devendra Aguirre MA on 1/15/2019  9:50 AM. (History)      Blood pressure (!) 172/100, pulse 92, temperature 97.3 °F (36.3 °C), temperature source Temporal, height 5' 4" (1.626 m), weight 86.2 kg (190 lb), SpO2 98 %. Body mass index is 32.61 kg/m².   Physical Exam   Constitutional: She appears well-developed. No distress.   Obese     HENT:   Nose: Nose normal.   Mouth/Throat: Oropharynx is clear and moist.   Eyes: Conjunctivae are normal. Right eye exhibits no discharge. Left eye exhibits no discharge. No scleral icterus.   Neck: Carotid bruit is present.   Cardiovascular: Normal rate, regular rhythm and normal heart sounds.   No murmur " heard.  Pulmonary/Chest: Effort normal and breath sounds normal. No respiratory distress. She has no decreased breath sounds. She has no wheezes. She has no rhonchi. She has no rales.   Abdominal: Soft. She exhibits no distension. There is no tenderness. There is no rebound and no guarding.   Musculoskeletal: She exhibits no edema.   Neurological: She is alert. She displays no tremor.   Skin: Skin is warm and dry.   Psychiatric: She has a normal mood and affect. Her speech is normal.   Nursing note and vitals reviewed.          Assessment:       1. Essential hypertension    2. Depression, unspecified depression type    3. Gastroesophageal reflux disease without esophagitis    4. Carotid stenosis, asymptomatic, bilateral        Plan:       Kayla was seen today for hypertension and depression.    Diagnoses and all orders for this visit:    Essential hypertension  Comments:  She has amlodipine 10mg at home; restart in the evenings  Orders:  -     valsartan-hydrochlorothiazide (DIOVAN-HCT) 320-25 mg per tablet; Take 1 tablet by mouth once daily.  -     amLODIPine (NORVASC) 10 MG tablet; Take 1 tablet (10 mg total) by mouth every evening.    Depression, unspecified depression type  -     escitalopram oxalate (LEXAPRO) 10 MG tablet; Take 1 tablet (10 mg total) by mouth once daily.    Gastroesophageal reflux disease without esophagitis  Comments:  Hasn't tried H2 blocker recently;  try for a month.    Carotid stenosis, asymptomatic, bilateral  Comments:  s/p right CEA.  She also had currently nonsignificant stenosis on the left; U/S q 6 months; due around March.  Discussed risk of HRT.  Orders:  -     rosuvastatin (CRESTOR) 10 MG tablet; Take 1 tablet (10 mg total) by mouth once daily.    Other orders  -     allopurinol (ZYLOPRIM) 300 MG tablet; Take 1 tablet (300 mg total) by mouth once daily.  -     Cancel: zolpidem (AMBIEN) 10 mg Tab; Take 1 tablet (10 mg total) by mouth nightly as needed.

## 2019-02-26 ENCOUNTER — OFFICE VISIT (OUTPATIENT)
Dept: FAMILY MEDICINE | Facility: CLINIC | Age: 64
End: 2019-02-26
Payer: COMMERCIAL

## 2019-02-26 VITALS
HEIGHT: 64 IN | WEIGHT: 189 LBS | BODY MASS INDEX: 32.27 KG/M2 | SYSTOLIC BLOOD PRESSURE: 110 MMHG | DIASTOLIC BLOOD PRESSURE: 80 MMHG | TEMPERATURE: 98 F | HEART RATE: 84 BPM

## 2019-02-26 DIAGNOSIS — K21.9 GASTROESOPHAGEAL REFLUX DISEASE WITHOUT ESOPHAGITIS: ICD-10-CM

## 2019-02-26 DIAGNOSIS — I65.23 CAROTID STENOSIS, ASYMPTOMATIC, BILATERAL: ICD-10-CM

## 2019-02-26 DIAGNOSIS — F32.A DEPRESSION, UNSPECIFIED DEPRESSION TYPE: ICD-10-CM

## 2019-02-26 DIAGNOSIS — I10 ESSENTIAL HYPERTENSION: ICD-10-CM

## 2019-02-26 PROCEDURE — 99213 OFFICE O/P EST LOW 20 MIN: CPT | Mod: ,,, | Performed by: INTERNAL MEDICINE

## 2019-02-26 PROCEDURE — 99213 PR OFFICE/OUTPT VISIT, EST, LEVL III, 20-29 MIN: ICD-10-PCS | Mod: ,,, | Performed by: INTERNAL MEDICINE

## 2019-02-26 RX ORDER — ZOLPIDEM TARTRATE 10 MG/1
10 TABLET ORAL NIGHTLY PRN
Qty: 90 TABLET | Refills: 0 | Status: SHIPPED | OUTPATIENT
Start: 2019-02-26 | End: 2019-06-11 | Stop reason: SDUPTHER

## 2019-02-26 RX ORDER — ALLOPURINOL 300 MG/1
300 TABLET ORAL DAILY
Qty: 90 TABLET | Refills: 1 | Status: SHIPPED | OUTPATIENT
Start: 2019-02-26 | End: 2019-06-11 | Stop reason: SDUPTHER

## 2019-02-26 RX ORDER — VALSARTAN AND HYDROCHLOROTHIAZIDE 320; 25 MG/1; MG/1
1 TABLET, FILM COATED ORAL DAILY
Qty: 90 TABLET | Refills: 1 | Status: SHIPPED | OUTPATIENT
Start: 2019-02-26 | End: 2019-06-11 | Stop reason: SDUPTHER

## 2019-02-26 RX ORDER — ROSUVASTATIN CALCIUM 10 MG/1
10 TABLET, COATED ORAL DAILY
Qty: 90 TABLET | Refills: 1 | Status: SHIPPED | OUTPATIENT
Start: 2019-02-26 | End: 2019-06-11 | Stop reason: SDUPTHER

## 2019-02-26 RX ORDER — AMLODIPINE BESYLATE 10 MG/1
10 TABLET ORAL NIGHTLY
Qty: 90 TABLET | Refills: 1
Start: 2019-02-26 | End: 2019-06-11 | Stop reason: SDUPTHER

## 2019-02-26 RX ORDER — ESCITALOPRAM OXALATE 10 MG/1
10 TABLET ORAL DAILY
Qty: 90 TABLET | Refills: 1 | Status: SHIPPED | OUTPATIENT
Start: 2019-02-26 | End: 2019-06-11 | Stop reason: SDUPTHER

## 2019-02-26 NOTE — PROGRESS NOTES
Subjective:       Patient ID: Kayla Piper is a 63 y.o. female.    Chief Complaint: Hypertension (continuity of care)    Here for follow up on blood pressure.  Still tends to be up and down on home monitoring but the highest it has been is 140s/90s.   She tends to get headaches when BP is low like it is today which is about every 3 days.  The lowest she has seen was 92/72.   Most days it is 120s/80s.   Tends to be lower in the mornings when she first gets up an higher in the afternoons/evenings.        Review of Systems   Constitutional: Negative for chills, fatigue, fever and unexpected weight change.   HENT: Negative for congestion, hearing loss, postnasal drip, rhinorrhea, trouble swallowing and voice change.    Eyes: Negative for photophobia and visual disturbance.   Respiratory: Negative for apnea, cough, choking, chest tightness, shortness of breath and wheezing.    Cardiovascular: Negative for chest pain, palpitations and leg swelling.   Gastrointestinal: Negative for abdominal pain, blood in stool, constipation, diarrhea, nausea, rectal pain and vomiting.   Endocrine: Negative for cold intolerance, heat intolerance, polydipsia and polyuria.   Genitourinary: Negative for decreased urine volume, difficulty urinating, dysuria, frequency, genital sores, hematuria, menstrual problem, pelvic pain, urgency, vaginal bleeding and vaginal discharge.   Musculoskeletal: Positive for arthralgias, myalgias, neck pain and neck stiffness. Negative for back pain, gait problem and joint swelling.   Skin: Negative for color change, rash and wound.   Allergic/Immunologic: Negative for environmental allergies and food allergies.   Neurological: Positive for headaches. Negative for dizziness, tremors, seizures, syncope, facial asymmetry, speech difficulty, weakness, light-headedness and numbness.   Hematological: Negative for adenopathy. Does not bruise/bleed easily.   Psychiatric/Behavioral: Positive for sleep  disturbance. Negative for confusion, hallucinations and suicidal ideas. The patient is nervous/anxious.        Past Medical History:   Diagnosis Date    Carotid stenosis, right     DDD (degenerative disc disease), cervical     Depression     Hyperkalemia     Hypertension     Insomnia     Rhinitis     Synovial cyst of popliteal space       Past Surgical History:   Procedure Laterality Date    ANTERIOR CERVICAL DISCECTOMY W/ FUSION      APPENDECTOMY      CAROTID ENDARTERECTOMY Right 10/03/2018    COLONOSCOPY  2013    KNEE SURGERY Right        Family History   Problem Relation Age of Onset    Hypertension Mother     Coronary artery disease Mother     Lung cancer Father     Hypertension Sister        Social History     Socioeconomic History    Marital status:      Spouse name: None    Number of children: None    Years of education: None    Highest education level: None   Social Needs    Financial resource strain: None    Food insecurity - worry: None    Food insecurity - inability: None    Transportation needs - medical: None    Transportation needs - non-medical: None   Occupational History    Occupation:    Tobacco Use    Smoking status: Former Smoker    Smokeless tobacco: Never Used   Substance and Sexual Activity    Alcohol use: Yes     Comment: occasional    Drug use: No    Sexual activity: Yes     Partners: Male     Birth control/protection: None     Comment:    Other Topics Concern    None   Social History Narrative    Live with        Current Outpatient Medications   Medication Sig Dispense Refill    allopurinol (ZYLOPRIM) 300 MG tablet Take 1 tablet (300 mg total) by mouth once daily. 90 tablet 1    amLODIPine (NORVASC) 10 MG tablet Take 1 tablet (10 mg total) by mouth every evening. 90 tablet 1    aspirin 325 MG tablet Take 325 mg by mouth once daily.      cetirizine (ZYRTEC) 10 MG tablet Take 10 mg by mouth once daily.      escitalopram  "oxalate (LEXAPRO) 10 MG tablet Take 1 tablet (10 mg total) by mouth once daily. 90 tablet 1    naproxen (NAPROSYN) 500 MG tablet TAKE 1 TABLET(500 MG) BY MOUTH TWICE DAILY 60 tablet 0    ranitidine (ZANTAC) 150 MG tablet Take 1 tablet (150 mg total) by mouth 2 (two) times daily. 180 tablet 1    rosuvastatin (CRESTOR) 10 MG tablet Take 1 tablet (10 mg total) by mouth once daily. 90 tablet 1    valsartan-hydrochlorothiazide (DIOVAN-HCT) 320-25 mg per tablet Take 1 tablet by mouth once daily. 90 tablet 1    zolpidem (AMBIEN) 10 mg Tab Take 1 tablet (10 mg total) by mouth nightly as needed. 90 tablet 0     No current facility-administered medications for this visit.        Review of patient's allergies indicates:  No Known Allergies  Objective:    HPI     Hypertension      Additional comments: continuity of care          Last edited by Devendra Aguirre MA on 2/26/2019 11:11 AM. (History)      Blood pressure 110/80, pulse 84, temperature 98.2 °F (36.8 °C), temperature source Temporal, height 5' 4" (1.626 m), weight 85.7 kg (189 lb). Body mass index is 32.44 kg/m².   Physical Exam   Constitutional: She appears well-developed. No distress.   Obese     HENT:   Nose: Nose normal.   Mouth/Throat: Oropharynx is clear and moist.   Eyes: Conjunctivae are normal. Right eye exhibits no discharge. Left eye exhibits no discharge. No scleral icterus.   Neck: Carotid bruit is present.   Cardiovascular: Normal rate, regular rhythm and normal heart sounds.   No murmur heard.  Pulmonary/Chest: Effort normal and breath sounds normal. No respiratory distress. She has no decreased breath sounds. She has no wheezes. She has no rhonchi. She has no rales.   Abdominal: Soft. She exhibits no distension. There is no tenderness. There is no rebound and no guarding.   Musculoskeletal: She exhibits no edema.   Neurological: She is alert. She displays no tremor.   Skin: Skin is warm and dry.   Psychiatric: She has a normal mood and affect. Her " speech is normal.   Nursing note and vitals reviewed.          Assessment:       1. Essential hypertension    2. Depression, unspecified depression type    3. Gastroesophageal reflux disease without esophagitis    4. Carotid stenosis, asymptomatic, bilateral        Plan:       Kayla was seen today for hypertension.    Diagnoses and all orders for this visit:    Essential hypertension  Comments:  Overall, BP is better controlled but could use more consitency. Try moving amlodipine to midday.  Orders:  -     amLODIPine (NORVASC) 10 MG tablet; Take 1 tablet (10 mg total) by mouth every evening.  -     valsartan-hydrochlorothiazide (DIOVAN-HCT) 320-25 mg per tablet; Take 1 tablet by mouth once daily.    Depression, unspecified depression type  -     escitalopram oxalate (LEXAPRO) 10 MG tablet; Take 1 tablet (10 mg total) by mouth once daily.    Gastroesophageal reflux disease without esophagitis  Comments:  Hasn't tried H2 blocker recently;  try for a month.  Orders:  -     ranitidine (ZANTAC) 150 MG tablet; Take 1 tablet (150 mg total) by mouth 2 (two) times daily.    Carotid stenosis, asymptomatic, bilateral  Comments:  s/p right CEA.  She also had currently nonsignificant stenosis on the left; U/S q 6 months; due around March.  Discussed risk of HRT.  Orders:  -     rosuvastatin (CRESTOR) 10 MG tablet; Take 1 tablet (10 mg total) by mouth once daily.    Other orders  -     allopurinol (ZYLOPRIM) 300 MG tablet; Take 1 tablet (300 mg total) by mouth once daily.  -     zolpidem (AMBIEN) 10 mg Tab; Take 1 tablet (10 mg total) by mouth nightly as needed.

## 2019-06-11 ENCOUNTER — OFFICE VISIT (OUTPATIENT)
Dept: FAMILY MEDICINE | Facility: CLINIC | Age: 64
End: 2019-06-11
Payer: COMMERCIAL

## 2019-06-11 VITALS
HEIGHT: 64 IN | SYSTOLIC BLOOD PRESSURE: 130 MMHG | WEIGHT: 174 LBS | BODY MASS INDEX: 29.71 KG/M2 | TEMPERATURE: 98 F | DIASTOLIC BLOOD PRESSURE: 66 MMHG | HEART RATE: 84 BPM | OXYGEN SATURATION: 98 %

## 2019-06-11 DIAGNOSIS — I65.23 CAROTID STENOSIS, ASYMPTOMATIC, BILATERAL: ICD-10-CM

## 2019-06-11 DIAGNOSIS — F32.A DEPRESSION, UNSPECIFIED DEPRESSION TYPE: ICD-10-CM

## 2019-06-11 DIAGNOSIS — G47.00 INSOMNIA, UNSPECIFIED TYPE: ICD-10-CM

## 2019-06-11 DIAGNOSIS — R60.0 PERIPHERAL EDEMA: ICD-10-CM

## 2019-06-11 DIAGNOSIS — I10 ESSENTIAL HYPERTENSION: Primary | ICD-10-CM

## 2019-06-11 DIAGNOSIS — M10.9 GOUT INVOLVING TOE OF RIGHT FOOT, UNSPECIFIED CAUSE, UNSPECIFIED CHRONICITY: ICD-10-CM

## 2019-06-11 DIAGNOSIS — K21.9 GASTROESOPHAGEAL REFLUX DISEASE WITHOUT ESOPHAGITIS: ICD-10-CM

## 2019-06-11 PROCEDURE — 99214 OFFICE O/P EST MOD 30 MIN: CPT | Mod: ,,, | Performed by: INTERNAL MEDICINE

## 2019-06-11 PROCEDURE — 99214 PR OFFICE/OUTPT VISIT, EST, LEVL IV, 30-39 MIN: ICD-10-PCS | Mod: ,,, | Performed by: INTERNAL MEDICINE

## 2019-06-11 RX ORDER — ESCITALOPRAM OXALATE 10 MG/1
10 TABLET ORAL DAILY
Qty: 90 TABLET | Refills: 1 | Status: SHIPPED | OUTPATIENT
Start: 2019-06-11 | End: 2019-11-12 | Stop reason: SDUPTHER

## 2019-06-11 RX ORDER — AMLODIPINE BESYLATE 10 MG/1
10 TABLET ORAL NIGHTLY
Qty: 90 TABLET | Refills: 1
Start: 2019-06-11 | End: 2019-08-20 | Stop reason: SDUPTHER

## 2019-06-11 RX ORDER — FLUTICASONE PROPIONATE 50 MCG
1 SPRAY, SUSPENSION (ML) NASAL DAILY
COMMUNITY

## 2019-06-11 RX ORDER — ROSUVASTATIN CALCIUM 10 MG/1
10 TABLET, COATED ORAL DAILY
Qty: 90 TABLET | Refills: 1 | Status: SHIPPED | OUTPATIENT
Start: 2019-06-11 | End: 2019-11-12 | Stop reason: SDUPTHER

## 2019-06-11 RX ORDER — VALSARTAN AND HYDROCHLOROTHIAZIDE 320; 25 MG/1; MG/1
1 TABLET, FILM COATED ORAL DAILY
Qty: 90 TABLET | Refills: 1 | Status: SHIPPED | OUTPATIENT
Start: 2019-06-11 | End: 2019-11-12 | Stop reason: SDUPTHER

## 2019-06-11 RX ORDER — ALBUTEROL SULFATE 90 UG/1
2 AEROSOL, METERED RESPIRATORY (INHALATION) DAILY PRN
COMMUNITY
End: 2019-06-11 | Stop reason: SDUPTHER

## 2019-06-11 RX ORDER — NAPROXEN 500 MG/1
TABLET ORAL
Qty: 60 TABLET | Refills: 3 | Status: SHIPPED | OUTPATIENT
Start: 2019-06-11 | End: 2020-06-17

## 2019-06-11 RX ORDER — ZOLPIDEM TARTRATE 10 MG/1
10 TABLET ORAL NIGHTLY PRN
Qty: 90 TABLET | Refills: 0 | Status: SHIPPED | OUTPATIENT
Start: 2019-06-11 | End: 2019-11-12 | Stop reason: SDUPTHER

## 2019-06-11 RX ORDER — ALLOPURINOL 300 MG/1
300 TABLET ORAL DAILY
Qty: 90 TABLET | Refills: 1 | Status: SHIPPED | OUTPATIENT
Start: 2019-06-11 | End: 2019-11-12 | Stop reason: SDUPTHER

## 2019-06-11 RX ORDER — ALBUTEROL SULFATE 90 UG/1
2 AEROSOL, METERED RESPIRATORY (INHALATION) DAILY PRN
Qty: 18 G | Refills: 3 | Status: SHIPPED | OUTPATIENT
Start: 2019-06-11 | End: 2020-06-17 | Stop reason: SDUPTHER

## 2019-06-11 NOTE — PROGRESS NOTES
Subjective:       Patient ID: Kayla Piper is a 63 y.o. female.    Chief Complaint: Leg Swelling (bilateral leg swelling)    Hypertension   This is a chronic problem. The problem has been waxing and waning since onset. The problem is controlled (monitors at home and has been up and down; sometimes it is 100s-110s/60s-70s and other times it is 130s-140s/ 80s-90s). Associated symptoms include neck pain and peripheral edema. Pertinent negatives include no anxiety, chest pain, headaches, palpitations or shortness of breath. Agents associated with hypertension include estrogens and thyroid hormones (she has been having some sciatica and is hurting today which could be contributing). Risk factors for coronary artery disease include dyslipidemia, obesity and post-menopausal state. Past treatments include diuretics and angiotensin blockers. There are no compliance problems.  Identifiable causes of hypertension include a thyroid problem.   Edema   This is a new problem. The current episode started in the past 7 days. The problem occurs intermittently. The problem has been waxing and waning. Associated symptoms include arthralgias, joint swelling (toes) and neck pain. Pertinent negatives include no abdominal pain, chest pain, chills, congestion, coughing, fatigue, fever, headaches, myalgias, nausea, numbness, rash, vomiting or weakness. Associated symptoms comments: Woke up 2 days earlier this week with swelling in her feet and legs.  Lasted all day.  Elevating feet did help some.  No recent travel and denies increase in salt intake. The time she noticed it was 2 days ago; they were fine yesterday and today.  Nothing new in diet or meds.    . Nothing aggravates the symptoms. She has tried position changes for the symptoms. The treatment provided mild relief.     Review of Systems   Constitutional: Negative for chills, fatigue, fever and unexpected weight change.   HENT: Positive for postnasal drip and rhinorrhea.  Negative for congestion, hearing loss, trouble swallowing and voice change.    Eyes: Negative for photophobia and visual disturbance.   Respiratory: Negative for apnea, cough, choking, chest tightness, shortness of breath and wheezing.    Cardiovascular: Positive for leg swelling. Negative for chest pain and palpitations.   Gastrointestinal: Negative for abdominal pain, blood in stool, constipation, diarrhea, nausea, rectal pain and vomiting.   Endocrine: Negative for cold intolerance, heat intolerance, polydipsia and polyuria.   Genitourinary: Negative for decreased urine volume, difficulty urinating, dysuria, frequency, genital sores, hematuria, menstrual problem, pelvic pain, urgency, vaginal bleeding and vaginal discharge.   Musculoskeletal: Positive for arthralgias, joint swelling (toes) and neck pain. Negative for back pain, gait problem, myalgias and neck stiffness.   Skin: Negative for color change, rash and wound.   Allergic/Immunologic: Negative for environmental allergies and food allergies.   Neurological: Negative for dizziness, tremors, seizures, syncope, facial asymmetry, speech difficulty, weakness, light-headedness, numbness and headaches.   Hematological: Negative for adenopathy. Does not bruise/bleed easily.   Psychiatric/Behavioral: Negative for confusion, hallucinations, sleep disturbance and suicidal ideas. The patient is nervous/anxious.        Past Medical History:   Diagnosis Date    Carotid stenosis, right     DDD (degenerative disc disease), cervical     Depression     Hyperkalemia     Hypertension     Insomnia     Rhinitis     Synovial cyst of popliteal space       Past Surgical History:   Procedure Laterality Date    ANTERIOR CERVICAL DISCECTOMY W/ FUSION      APPENDECTOMY      CAROTID ENDARTERECTOMY Right 10/03/2018    COLONOSCOPY  2013    KNEE SURGERY Right        Family History   Problem Relation Age of Onset    Hypertension Mother     Coronary artery disease Mother      Lung cancer Father     Hypertension Sister        Social History     Socioeconomic History    Marital status:      Spouse name: Not on file    Number of children: Not on file    Years of education: Not on file    Highest education level: Not on file   Occupational History    Occupation:    Social Needs    Financial resource strain: Not on file    Food insecurity:     Worry: Not on file     Inability: Not on file    Transportation needs:     Medical: Not on file     Non-medical: Not on file   Tobacco Use    Smoking status: Former Smoker    Smokeless tobacco: Never Used   Substance and Sexual Activity    Alcohol use: Yes     Comment: occasional    Drug use: No    Sexual activity: Yes     Partners: Male     Birth control/protection: None     Comment:    Lifestyle    Physical activity:     Days per week: Not on file     Minutes per session: Not on file    Stress: Very much   Relationships    Social connections:     Talks on phone: Not on file     Gets together: Not on file     Attends Church service: Not on file     Active member of club or organization: Not on file     Attends meetings of clubs or organizations: Not on file     Relationship status: Not on file   Other Topics Concern    Not on file   Social History Narrative    Live with        Current Outpatient Medications   Medication Sig Dispense Refill    albuterol (VENTOLIN HFA) 90 mcg/actuation inhaler Inhale 2 puffs into the lungs daily as needed. 18 g 3    allopurinol (ZYLOPRIM) 300 MG tablet Take 1 tablet (300 mg total) by mouth once daily. 90 tablet 1    amLODIPine (NORVASC) 10 MG tablet Take 1 tablet (10 mg total) by mouth every evening. 90 tablet 1    aspirin 325 MG tablet Take 325 mg by mouth once daily.      cetirizine (ZYRTEC) 10 MG tablet Take 10 mg by mouth once daily.      escitalopram oxalate (LEXAPRO) 10 MG tablet Take 1 tablet (10 mg total) by mouth once daily. 90 tablet 1     "fluticasone propionate (FLONASE) 50 mcg/actuation nasal spray 1 spray by Each Nare route once daily.      naproxen (NAPROSYN) 500 MG tablet TAKE 1 TABLET(500 MG) BY MOUTH TWICE DAILY 60 tablet 3    ranitidine (ZANTAC) 150 MG tablet Take 1 tablet (150 mg total) by mouth 2 (two) times daily. 180 tablet 1    rosuvastatin (CRESTOR) 10 MG tablet Take 1 tablet (10 mg total) by mouth once daily. 90 tablet 1    valsartan-hydrochlorothiazide (DIOVAN-HCT) 320-25 mg per tablet Take 1 tablet by mouth once daily. 90 tablet 1    zolpidem (AMBIEN) 10 mg Tab Take 1 tablet (10 mg total) by mouth nightly as needed. 90 tablet 0     No current facility-administered medications for this visit.        Review of patient's allergies indicates:  No Known Allergies  Objective:    HPI     Leg Swelling      Additional comments: bilateral leg swelling          Last edited by Devendra Aguirre MA on 6/11/2019  1:05 PM. (History)      Blood pressure 130/66, pulse 84, temperature 98.2 °F (36.8 °C), temperature source Temporal, height 5' 4" (1.626 m), weight 78.9 kg (174 lb), SpO2 98 %. Body mass index is 29.87 kg/m².   Physical Exam   Constitutional: She appears well-developed. No distress.   Obese     HENT:   Nose: Nose normal.   Mouth/Throat: Oropharynx is clear and moist.   Eyes: Conjunctivae are normal. Right eye exhibits no discharge. Left eye exhibits no discharge. No scleral icterus.   Neck: Carotid bruit is present.   Cardiovascular: Normal rate, regular rhythm and normal heart sounds.   No murmur heard.  Pulmonary/Chest: Effort normal and breath sounds normal. No respiratory distress. She has no decreased breath sounds. She has no wheezes. She has no rhonchi. She has no rales.   Abdominal: Soft. She exhibits no distension. There is no tenderness. There is no rebound and no guarding.   Musculoskeletal: She exhibits no edema.   Neurological: She is alert. She displays no tremor.   Skin: Skin is warm and dry.   Psychiatric: She has a " normal mood and affect. Her speech is normal.   Nursing note and vitals reviewed.          Assessment:       1. Essential hypertension    2. Carotid stenosis, asymptomatic, bilateral    3. Gastroesophageal reflux disease without esophagitis    4. Gout involving toe of right foot, unspecified cause, unspecified chronicity    5. Depression, unspecified depression type    6. Insomnia, unspecified type    7. Peripheral edema        Plan:       Kayla was seen today for leg swelling.    Diagnoses and all orders for this visit:    Essential hypertension  -     valsartan-hydrochlorothiazide (DIOVAN-HCT) 320-25 mg per tablet; Take 1 tablet by mouth once daily.  -     amLODIPine (NORVASC) 10 MG tablet; Take 1 tablet (10 mg total) by mouth every evening.  -     Lipid panel; Future  -     Urinalysis; Future  -     Comprehensive metabolic panel; Future  -     Lipid panel  -     Urinalysis  -     Comprehensive metabolic panel    Carotid stenosis, asymptomatic, bilateral  Comments:  s/p right CEA.  She also had currently nonsignificant stenosis on the left; U/S q 6 months; followed by Dr. Méndez  Orders:  -     rosuvastatin (CRESTOR) 10 MG tablet; Take 1 tablet (10 mg total) by mouth once daily.  -     Lipid panel; Future  -     CBC auto differential; Future  -     Lipid panel  -     CBC auto differential    Gastroesophageal reflux disease without esophagitis  Comments:  Hasn't tried H2 blocker recently;  try for a month.  Orders:  -     ranitidine (ZANTAC) 150 MG tablet; Take 1 tablet (150 mg total) by mouth 2 (two) times daily.  -     CBC auto differential; Future  -     CBC auto differential    Gout involving toe of right foot, unspecified cause, unspecified chronicity  -     naproxen (NAPROSYN) 500 MG tablet; TAKE 1 TABLET(500 MG) BY MOUTH TWICE DAILY  -     allopurinol (ZYLOPRIM) 300 MG tablet; Take 1 tablet (300 mg total) by mouth once daily.  -     Uric acid; Future  -     Uric acid    Depression, unspecified depression  type  -     escitalopram oxalate (LEXAPRO) 10 MG tablet; Take 1 tablet (10 mg total) by mouth once daily.    Insomnia, unspecified type  -     zolpidem (AMBIEN) 10 mg Tab; Take 1 tablet (10 mg total) by mouth nightly as needed.    Peripheral edema  Comments:  No edema today.  Will monitor.  Due routine labs.  Could be r/t meds, especially amlodipine    Other orders  -     albuterol (VENTOLIN HFA) 90 mcg/actuation inhaler; Inhale 2 puffs into the lungs daily as needed.

## 2019-06-15 LAB
ALBUMIN SERPL-MCNC: 4.8 G/DL (ref 3.6–4.8)
ALBUMIN/GLOB SERPL: 1.9 {RATIO} (ref 1.2–2.2)
ALP SERPL-CCNC: 82 IU/L (ref 39–117)
ALT SERPL-CCNC: 25 IU/L (ref 0–32)
APPEARANCE UR: CLEAR
AST SERPL-CCNC: 25 IU/L (ref 0–40)
BASOPHILS # BLD AUTO: 0 X10E3/UL (ref 0–0.2)
BASOPHILS NFR BLD AUTO: 1 %
BILIRUB SERPL-MCNC: <0.2 MG/DL (ref 0–1.2)
BILIRUB UR QL STRIP: NEGATIVE
BUN SERPL-MCNC: 27 MG/DL (ref 8–27)
BUN/CREAT SERPL: 27 (ref 12–28)
CALCIUM SERPL-MCNC: 10.5 MG/DL (ref 8.7–10.3)
CHLORIDE SERPL-SCNC: 102 MMOL/L (ref 96–106)
CHOLEST SERPL-MCNC: 131 MG/DL (ref 100–199)
CO2 SERPL-SCNC: 25 MMOL/L (ref 20–29)
COLOR UR: YELLOW
CREAT SERPL-MCNC: 1.01 MG/DL (ref 0.57–1)
EOSINOPHIL # BLD AUTO: 0.1 X10E3/UL (ref 0–0.4)
EOSINOPHIL NFR BLD AUTO: 2 %
ERYTHROCYTE [DISTWIDTH] IN BLOOD BY AUTOMATED COUNT: 14.5 % (ref 12.3–15.4)
GLOBULIN SER CALC-MCNC: 2.5 G/DL (ref 1.5–4.5)
GLUCOSE SERPL-MCNC: 134 MG/DL (ref 65–99)
GLUCOSE UR QL: NEGATIVE
HCT VFR BLD AUTO: 38.1 % (ref 34–46.6)
HDLC SERPL-MCNC: 46 MG/DL
HGB BLD-MCNC: 12 G/DL (ref 11.1–15.9)
HGB UR QL STRIP: NEGATIVE
IMM GRANULOCYTES # BLD AUTO: 0 X10E3/UL (ref 0–0.1)
IMM GRANULOCYTES NFR BLD AUTO: 0 %
KETONES UR QL STRIP: NEGATIVE
LDLC SERPL CALC-MCNC: 65 MG/DL (ref 0–99)
LEUKOCYTE ESTERASE UR QL STRIP: NEGATIVE
LYMPHOCYTES # BLD AUTO: 1.9 X10E3/UL (ref 0.7–3.1)
LYMPHOCYTES NFR BLD AUTO: 36 %
MCH RBC QN AUTO: 26.7 PG (ref 26.6–33)
MCHC RBC AUTO-ENTMCNC: 31.5 G/DL (ref 31.5–35.7)
MCV RBC AUTO: 85 FL (ref 79–97)
MICRO URNS: NORMAL
MONOCYTES # BLD AUTO: 0.3 X10E3/UL (ref 0.1–0.9)
MONOCYTES NFR BLD AUTO: 6 %
NEUTROPHILS # BLD AUTO: 2.9 X10E3/UL (ref 1.4–7)
NEUTROPHILS NFR BLD AUTO: 55 %
NITRITE UR QL STRIP: NEGATIVE
PH UR STRIP: 6 [PH] (ref 5–7.5)
PLATELET # BLD AUTO: 221 X10E3/UL (ref 150–450)
POTASSIUM SERPL-SCNC: 4.5 MMOL/L (ref 3.5–5.2)
PROT SERPL-MCNC: 7.3 G/DL (ref 6–8.5)
PROT UR QL STRIP: NEGATIVE
RBC # BLD AUTO: 4.5 X10E6/UL (ref 3.77–5.28)
SODIUM SERPL-SCNC: 143 MMOL/L (ref 134–144)
SP GR UR: 1.01 (ref 1–1.03)
TRIGL SERPL-MCNC: 99 MG/DL (ref 0–149)
URATE SERPL-MCNC: 4.8 MG/DL (ref 2.5–7.1)
UROBILINOGEN UR STRIP-MCNC: 0.2 MG/DL (ref 0.2–1)
VLDLC SERPL CALC-MCNC: 20 MG/DL (ref 5–40)
WBC # BLD AUTO: 5.3 X10E3/UL (ref 3.4–10.8)

## 2019-08-20 DIAGNOSIS — I10 ESSENTIAL HYPERTENSION: ICD-10-CM

## 2019-08-20 RX ORDER — AMLODIPINE BESYLATE 10 MG/1
TABLET ORAL
Qty: 90 TABLET | Refills: 4 | Status: SHIPPED | OUTPATIENT
Start: 2019-08-20 | End: 2019-11-12 | Stop reason: SDUPTHER

## 2019-11-12 ENCOUNTER — OFFICE VISIT (OUTPATIENT)
Dept: FAMILY MEDICINE | Facility: CLINIC | Age: 64
End: 2019-11-12
Payer: COMMERCIAL

## 2019-11-12 VITALS
BODY MASS INDEX: 29.37 KG/M2 | WEIGHT: 172 LBS | HEART RATE: 74 BPM | TEMPERATURE: 98 F | OXYGEN SATURATION: 99 % | SYSTOLIC BLOOD PRESSURE: 140 MMHG | DIASTOLIC BLOOD PRESSURE: 82 MMHG | HEIGHT: 64 IN

## 2019-11-12 DIAGNOSIS — I65.23 CAROTID STENOSIS, ASYMPTOMATIC, BILATERAL: ICD-10-CM

## 2019-11-12 DIAGNOSIS — R73.01 IMPAIRED FASTING GLUCOSE: Primary | ICD-10-CM

## 2019-11-12 DIAGNOSIS — M10.9 GOUT INVOLVING TOE OF RIGHT FOOT, UNSPECIFIED CAUSE, UNSPECIFIED CHRONICITY: ICD-10-CM

## 2019-11-12 DIAGNOSIS — F32.A DEPRESSION, UNSPECIFIED DEPRESSION TYPE: ICD-10-CM

## 2019-11-12 DIAGNOSIS — Z12.31 BREAST CANCER SCREENING BY MAMMOGRAM: ICD-10-CM

## 2019-11-12 DIAGNOSIS — G47.00 INSOMNIA, UNSPECIFIED TYPE: ICD-10-CM

## 2019-11-12 DIAGNOSIS — I10 ESSENTIAL HYPERTENSION: ICD-10-CM

## 2019-11-12 LAB — HBA1C MFR BLD: 5.5 %

## 2019-11-12 PROCEDURE — 83036 POCT HEMOGLOBIN A1C: ICD-10-PCS | Mod: QW,,, | Performed by: INTERNAL MEDICINE

## 2019-11-12 PROCEDURE — 83036 HEMOGLOBIN GLYCOSYLATED A1C: CPT | Mod: QW,,, | Performed by: INTERNAL MEDICINE

## 2019-11-12 PROCEDURE — 99214 OFFICE O/P EST MOD 30 MIN: CPT | Mod: S$GLB,,, | Performed by: INTERNAL MEDICINE

## 2019-11-12 PROCEDURE — 99214 PR OFFICE/OUTPT VISIT, EST, LEVL IV, 30-39 MIN: ICD-10-PCS | Mod: S$GLB,,, | Performed by: INTERNAL MEDICINE

## 2019-11-12 RX ORDER — AMLODIPINE BESYLATE 10 MG/1
10 TABLET ORAL DAILY
Qty: 90 TABLET | Refills: 1 | Status: SHIPPED | OUTPATIENT
Start: 2019-11-12 | End: 2020-06-17 | Stop reason: SDUPTHER

## 2019-11-12 RX ORDER — ALLOPURINOL 300 MG/1
300 TABLET ORAL DAILY
Qty: 90 TABLET | Refills: 1 | Status: SHIPPED | OUTPATIENT
Start: 2019-11-12 | End: 2020-05-26

## 2019-11-12 RX ORDER — ZOLPIDEM TARTRATE 10 MG/1
10 TABLET ORAL NIGHTLY PRN
Qty: 90 TABLET | Refills: 0 | Status: SHIPPED | OUTPATIENT
Start: 2019-11-12 | End: 2020-02-18 | Stop reason: SDUPTHER

## 2019-11-12 RX ORDER — ROSUVASTATIN CALCIUM 10 MG/1
10 TABLET, COATED ORAL DAILY
Qty: 90 TABLET | Refills: 1 | Status: SHIPPED | OUTPATIENT
Start: 2019-11-12 | End: 2020-05-13

## 2019-11-12 RX ORDER — VALSARTAN AND HYDROCHLOROTHIAZIDE 320; 25 MG/1; MG/1
1 TABLET, FILM COATED ORAL DAILY
Qty: 90 TABLET | Refills: 1 | Status: SHIPPED | OUTPATIENT
Start: 2019-11-12 | End: 2020-06-17 | Stop reason: SDUPTHER

## 2019-11-12 RX ORDER — ESCITALOPRAM OXALATE 10 MG/1
10 TABLET ORAL DAILY
Qty: 90 TABLET | Refills: 1 | Status: SHIPPED | OUTPATIENT
Start: 2019-11-12 | End: 2020-05-26

## 2019-11-12 NOTE — PROGRESS NOTES
Subjective:       Patient ID: Kayla Piper is a 64 y.o. female.    Chief Complaint: Hypertension (continuity of care) and Hyperlipidemia    Here for f routine follow up and med refills.  Feels well overall.  Depression and reflux well controlled on meds.     Hypertension   This is a chronic problem. The problem has been waxing and waning since onset. The problem is controlled ( usually 100s-110s/60s-70s on home monitoring). Pertinent negatives include no anxiety, chest pain, headaches, neck pain, palpitations, peripheral edema or shortness of breath. Agents associated with hypertension include estrogens and thyroid hormones (she has been having some sciatica and is hurting today which could be contributing). Risk factors for coronary artery disease include dyslipidemia, obesity and post-menopausal state. Past treatments include diuretics, angiotensin blockers and calcium channel blockers. There are no compliance problems.  Hypertensive end-organ damage includes PVD. Identifiable causes of hypertension include a thyroid problem.   Hyperlipidemia   This is a chronic problem. The problem is controlled. Recent lipid tests were reviewed and are normal. Factors aggravating her hyperlipidemia include thiazides. Pertinent negatives include no chest pain, myalgias or shortness of breath. Current antihyperlipidemic treatment includes statins. The current treatment provides significant improvement of lipids. There are no compliance problems.      Review of Systems   Constitutional: Negative for chills, fatigue, fever and unexpected weight change.   HENT: Negative for congestion, hearing loss, postnasal drip, rhinorrhea, trouble swallowing and voice change.    Eyes: Negative for photophobia and visual disturbance.   Respiratory: Negative for apnea, cough, choking, chest tightness, shortness of breath and wheezing.    Cardiovascular: Negative for chest pain, palpitations and leg swelling.   Gastrointestinal: Negative  for abdominal pain, blood in stool, constipation, diarrhea, nausea, rectal pain and vomiting.   Endocrine: Negative for cold intolerance, heat intolerance, polydipsia and polyuria.   Genitourinary: Negative for decreased urine volume, difficulty urinating, dysuria, frequency, genital sores, hematuria, menstrual problem, pelvic pain, urgency, vaginal bleeding and vaginal discharge.   Musculoskeletal: Negative for arthralgias, back pain, gait problem, joint swelling, myalgias, neck pain and neck stiffness.   Skin: Negative for color change, rash and wound.   Allergic/Immunologic: Negative for environmental allergies and food allergies.   Neurological: Negative for dizziness, tremors, seizures, syncope, facial asymmetry, speech difficulty, weakness, light-headedness, numbness and headaches.   Hematological: Negative for adenopathy. Bruises/bleeds easily.   Psychiatric/Behavioral: Negative for confusion, hallucinations, sleep disturbance and suicidal ideas. The patient is not nervous/anxious.        Past Medical History:   Diagnosis Date    Carotid stenosis, right     DDD (degenerative disc disease), cervical     Depression     Hyperkalemia     Hypertension     Insomnia     Rhinitis     Synovial cyst of popliteal space       Past Surgical History:   Procedure Laterality Date    ANTERIOR CERVICAL DISCECTOMY W/ FUSION      APPENDECTOMY      CAROTID ENDARTERECTOMY Right 10/03/2018    COLONOSCOPY  2013    KNEE SURGERY Right        Family History   Problem Relation Age of Onset    Hypertension Mother     Coronary artery disease Mother     Lung cancer Father     Hypertension Sister        Social History     Socioeconomic History    Marital status:      Spouse name: Not on file    Number of children: Not on file    Years of education: Not on file    Highest education level: Not on file   Occupational History    Occupation:    Social Needs    Financial resource strain: Not on file     Food insecurity:     Worry: Not on file     Inability: Not on file    Transportation needs:     Medical: Not on file     Non-medical: Not on file   Tobacco Use    Smoking status: Former Smoker    Smokeless tobacco: Never Used   Substance and Sexual Activity    Alcohol use: Yes     Comment: occasional    Drug use: No    Sexual activity: Yes     Partners: Male     Birth control/protection: None     Comment:    Lifestyle    Physical activity:     Days per week: Not on file     Minutes per session: Not on file    Stress: Only a little   Relationships    Social connections:     Talks on phone: Not on file     Gets together: Not on file     Attends Mu-ism service: Not on file     Active member of club or organization: Not on file     Attends meetings of clubs or organizations: Not on file     Relationship status: Not on file   Other Topics Concern    Not on file   Social History Narrative    Live with        Current Outpatient Medications   Medication Sig Dispense Refill    albuterol (VENTOLIN HFA) 90 mcg/actuation inhaler Inhale 2 puffs into the lungs daily as needed. 18 g 3    allopurinol (ZYLOPRIM) 300 MG tablet Take 1 tablet (300 mg total) by mouth once daily. 90 tablet 1    amLODIPine (NORVASC) 10 MG tablet Take 1 tablet (10 mg total) by mouth once daily. 90 tablet 1    aspirin 325 MG tablet Take 325 mg by mouth once daily.      cetirizine (ZYRTEC) 10 MG tablet Take 10 mg by mouth once daily.      escitalopram oxalate (LEXAPRO) 10 MG tablet Take 1 tablet (10 mg total) by mouth once daily. 90 tablet 1    fluticasone propionate (FLONASE) 50 mcg/actuation nasal spray 1 spray by Each Nare route once daily.      naproxen (NAPROSYN) 500 MG tablet TAKE 1 TABLET(500 MG) BY MOUTH TWICE DAILY 60 tablet 3    ranitidine (ZANTAC) 150 MG tablet Take 1 tablet (150 mg total) by mouth 2 (two) times daily. 180 tablet 1    rosuvastatin (CRESTOR) 10 MG tablet Take 1 tablet (10 mg total) by mouth once  "daily. 90 tablet 1    valsartan-hydrochlorothiazide (DIOVAN-HCT) 320-25 mg per tablet Take 1 tablet by mouth once daily. 90 tablet 1    zolpidem (AMBIEN) 10 mg Tab Take 1 tablet (10 mg total) by mouth nightly as needed. 90 tablet 0     No current facility-administered medications for this visit.        Review of patient's allergies indicates:  No Known Allergies  Objective:    HPI     Hypertension      Additional comments: continuity of care          Last edited by Devendra Aguirre MA on 11/12/2019  9:59 AM. (History)      Blood pressure (!) 140/82, pulse 74, temperature 97.7 °F (36.5 °C), temperature source Temporal, height 5' 4" (1.626 m), weight 78 kg (172 lb), SpO2 99 %. Body mass index is 29.52 kg/m².   Physical Exam   Constitutional: She appears well-developed. No distress.   Obese     HENT:   Nose: Nose normal.   Mouth/Throat: Oropharynx is clear and moist.   Eyes: Conjunctivae are normal. Right eye exhibits no discharge. Left eye exhibits no discharge. No scleral icterus.   Neck: Carotid bruit is present.   Cardiovascular: Normal rate, regular rhythm and normal heart sounds.   No murmur heard.  Pulmonary/Chest: Effort normal and breath sounds normal. No respiratory distress. She has no decreased breath sounds. She has no wheezes. She has no rhonchi. She has no rales.   Abdominal: Soft. She exhibits no distension. There is no tenderness. There is no rebound and no guarding.   Musculoskeletal: She exhibits no edema.   Neurological: She is alert. She displays no tremor.   Skin: Skin is warm and dry.   Psychiatric: She has a normal mood and affect. Her speech is normal.   Nursing note and vitals reviewed.        Office Visit on 11/12/2019   Component Date Value Ref Range Status    Hemoglobin A1C 11/12/2019 5.5  % Final   Office Visit on 06/11/2019   Component Date Value Ref Range Status    Cholesterol 06/14/2019 131  100 - 199 mg/dL Final    Triglycerides 06/14/2019 99  0 - 149 mg/dL Final    HDL 06/14/2019 " 46  >39 mg/dL Final    VLDL Cholesterol Hans 06/14/2019 20  5 - 40 mg/dL Final    LDL Calculated 06/14/2019 65  0 - 99 mg/dL Final    Specific Gravity, UA 06/14/2019 1.013  1.005 - 1.030 Final    pH, UA 06/14/2019 6.0  5.0 - 7.5 Final    Color, UA 06/14/2019 Yellow  Yellow Final    Clarity, UA 06/14/2019 Clear  Clear Final    Leukocytes, UA 06/14/2019 Negative  Negative Final    Protein, UA 06/14/2019 Negative  Negative/Trace Final    Glucose, UA 06/14/2019 Negative  Negative Final    Ketones, UA 06/14/2019 Negative  Negative Final    Occult Blood UA 06/14/2019 Negative  Negative Final    Bilirubin, UA 06/14/2019 Negative  Negative Final    Urobilinogen, UA 06/14/2019 0.2  0.2 - 1.0 mg/dL Final    Nitrite, UA 06/14/2019 Negative  Negative Final    Microscopic Examination 06/14/2019 Comment   Final    Microscopic not indicated and not performed.    Glucose 06/14/2019 134* 65 - 99 mg/dL Final    BUN, Bld 06/14/2019 27  8 - 27 mg/dL Final    Creatinine 06/14/2019 1.01* 0.57 - 1.00 mg/dL Final    eGFR if non African American 06/14/2019 60  >59 mL/min/1.73 Final    eGFR if  06/14/2019 69  >59 mL/min/1.73 Final    BUN/Creatinine Ratio 06/14/2019 27  12 - 28 Final    Sodium 06/14/2019 143  134 - 144 mmol/L Final    Potassium 06/14/2019 4.5  3.5 - 5.2 mmol/L Final    Chloride 06/14/2019 102  96 - 106 mmol/L Final    CO2 06/14/2019 25  20 - 29 mmol/L Final    Calcium 06/14/2019 10.5* 8.7 - 10.3 mg/dL Final    Total Protein 06/14/2019 7.3  6.0 - 8.5 g/dL Final    Albumin 06/14/2019 4.8  3.6 - 4.8 g/dL Final    Globulin, Total 06/14/2019 2.5  1.5 - 4.5 g/dL Final    Albumin/Globulin Ratio 06/14/2019 1.9  1.2 - 2.2 Final    Total Bilirubin 06/14/2019 <0.2  0.0 - 1.2 mg/dL Final    Alkaline Phosphatase 06/14/2019 82  39 - 117 IU/L Final    AST 06/14/2019 25  0 - 40 IU/L Final    ALT 06/14/2019 25  0 - 32 IU/L Final    Uric Acid 06/14/2019 4.8  2.5 - 7.1 mg/dL Final                Therapeutic target for gout patients: <6.0    WBC 06/14/2019 5.3  3.4 - 10.8 x10E3/uL Final    RBC 06/14/2019 4.50  3.77 - 5.28 x10E6/uL Final    Hemoglobin 06/14/2019 12.0  11.1 - 15.9 g/dL Final    Hematocrit 06/14/2019 38.1  34.0 - 46.6 % Final    Mean Corpuscular Volume 06/14/2019 85  79 - 97 fL Final    Mean Corpuscular Hemoglobin 06/14/2019 26.7  26.6 - 33.0 pg Final    Mean Corpuscular Hemoglobin Conc 06/14/2019 31.5  31.5 - 35.7 g/dL Final    RDW 06/14/2019 14.5  12.3 - 15.4 % Final    Platelets 06/14/2019 221  150 - 450 x10E3/uL Final    Neutrophils 06/14/2019 55  Not Estab. % Final    Lymph% 06/14/2019 36  Not Estab. % Final    Mono% 06/14/2019 6  Not Estab. % Final    Eosinophil% 06/14/2019 2  Not Estab. % Final    Basophil% 06/14/2019 1  Not Estab. % Final    Neutrophils Absolute 06/14/2019 2.9  1.4 - 7.0 x10E3/uL Final    Lymph # 06/14/2019 1.9  0.7 - 3.1 x10E3/uL Final    Mono # 06/14/2019 0.3  0.1 - 0.9 x10E3/uL Final    Eos # 06/14/2019 0.1  0.0 - 0.4 x10E3/uL Final    Baso # 06/14/2019 0.0  0.0 - 0.2 x10E3/uL Final    Immature Granulocytes 06/14/2019 0  Not Estab. % Final    Immature Grans (Abs) 06/14/2019 0.0  0.0 - 0.1 x10E3/uL Final   ]  Assessment:       1. Impaired fasting glucose    2. Gout involving toe of right foot, unspecified cause, unspecified chronicity    3. Essential hypertension    4. Depression, unspecified depression type    5. Carotid stenosis, asymptomatic, bilateral    6. Insomnia, unspecified type        Plan:       Kayla was seen today for hypertension and hyperlipidemia.    Diagnoses and all orders for this visit:    Impaired fasting glucose  -     Hemoglobin A1C, POCT    Gout involving toe of right foot, unspecified cause, unspecified chronicity  -     allopurinol (ZYLOPRIM) 300 MG tablet; Take 1 tablet (300 mg total) by mouth once daily.    Essential hypertension  -     amLODIPine (NORVASC) 10 MG tablet; Take 1 tablet (10 mg total) by mouth once  daily.  -     valsartan-hydrochlorothiazide (DIOVAN-HCT) 320-25 mg per tablet; Take 1 tablet by mouth once daily.    Depression, unspecified depression type  -     escitalopram oxalate (LEXAPRO) 10 MG tablet; Take 1 tablet (10 mg total) by mouth once daily.    Carotid stenosis, asymptomatic, bilateral  Comments:  s/p right CEA.  She also had currently nonsignificant stenosis on the left; U/S q 6 months; followed by Dr. Méndez  Orders:  -     rosuvastatin (CRESTOR) 10 MG tablet; Take 1 tablet (10 mg total) by mouth once daily.    Insomnia, unspecified type  -     zolpidem (AMBIEN) 10 mg Tab; Take 1 tablet (10 mg total) by mouth nightly as needed.

## 2020-01-01 NOTE — TELEPHONE ENCOUNTER
----- Message from Theron Jimenez Jr., MD sent at 6/29/2018  7:52 AM CDT -----  I have reviewed your results.  They demonstrate no concerning findings.  If you have any additional concerns regarding these tests, please contact me at your convenience.     Health Maintenance Due   Topic Date Due   • Hepatitis B Vaccine (2 of 3 - 3-dose primary series) 2020       Patient is due for the topics as listed above and wishes to proceed with them. Patient is following pediatric unified immunization schedule for immunizations.    Vaccine Information Statement(s) was given today. This has been reviewed, questions answered, and verbal consent given by Parent for injection(s) and administration of Haemophilus Influenza type b (Hib), Pediarix, Pneumococcal Conjugate (PCV13) and Rotavirus.    Patient tolerated without incident. See immunization grid for documentation.

## 2020-01-28 DIAGNOSIS — K21.9 GASTROESOPHAGEAL REFLUX DISEASE WITHOUT ESOPHAGITIS: ICD-10-CM

## 2020-02-18 ENCOUNTER — OFFICE VISIT (OUTPATIENT)
Dept: FAMILY MEDICINE | Facility: CLINIC | Age: 65
End: 2020-02-18
Payer: COMMERCIAL

## 2020-02-18 VITALS
SYSTOLIC BLOOD PRESSURE: 120 MMHG | BODY MASS INDEX: 30.22 KG/M2 | HEIGHT: 64 IN | WEIGHT: 177 LBS | DIASTOLIC BLOOD PRESSURE: 70 MMHG | HEART RATE: 71 BPM | OXYGEN SATURATION: 98 % | TEMPERATURE: 99 F

## 2020-02-18 DIAGNOSIS — I10 ESSENTIAL HYPERTENSION: Primary | ICD-10-CM

## 2020-02-18 DIAGNOSIS — G47.00 INSOMNIA, UNSPECIFIED TYPE: ICD-10-CM

## 2020-02-18 PROCEDURE — 99213 OFFICE O/P EST LOW 20 MIN: CPT | Mod: S$GLB,,, | Performed by: INTERNAL MEDICINE

## 2020-02-18 PROCEDURE — 99213 PR OFFICE/OUTPT VISIT, EST, LEVL III, 20-29 MIN: ICD-10-PCS | Mod: S$GLB,,, | Performed by: INTERNAL MEDICINE

## 2020-02-18 RX ORDER — MULTIVITAMIN
1 TABLET ORAL DAILY
COMMUNITY

## 2020-02-18 RX ORDER — ZOLPIDEM TARTRATE 10 MG/1
10 TABLET ORAL NIGHTLY PRN
Qty: 90 TABLET | Refills: 0 | Status: SHIPPED | OUTPATIENT
Start: 2020-02-18 | End: 2020-06-17 | Stop reason: SDUPTHER

## 2020-02-18 RX ORDER — HYDROCODONE BITARTRATE AND ACETAMINOPHEN 5; 325 MG/1; MG/1
1 TABLET ORAL
COMMUNITY
Start: 2020-01-30 | End: 2020-06-17

## 2020-02-18 NOTE — PROGRESS NOTES
Subjective:       Patient ID: Kayla Piper is a 64 y.o. female.    Chief Complaint: Hypertension (continuity of care) and Hyperlipidemia    Here for f routine follow up and med refills.  Feels well overall.   Sleeping fine on ambien.  Feels refreshed in AM and not aware of any nighttime wandering.    Hypertension   This is a chronic problem. The problem is controlled ( usually 100s-110s/60s-70s on home monitoring). Associated symptoms include neck pain. Pertinent negatives include no anxiety, chest pain, headaches, palpitations, peripheral edema or shortness of breath. Agents associated with hypertension include estrogens and thyroid hormones (she has been having some sciatica and is hurting today which could be contributing). Risk factors for coronary artery disease include dyslipidemia, obesity and post-menopausal state. Past treatments include diuretics, angiotensin blockers and calcium channel blockers. There are no compliance problems.  Hypertensive end-organ damage includes PVD. Identifiable causes of hypertension include a thyroid problem.     Review of Systems   Constitutional: Negative for chills, fatigue, fever and unexpected weight change.   HENT: Positive for postnasal drip. Negative for congestion, hearing loss, rhinorrhea, trouble swallowing and voice change.    Eyes: Negative for photophobia and visual disturbance.   Respiratory: Negative for apnea, cough, choking, chest tightness, shortness of breath and wheezing.    Cardiovascular: Negative for chest pain, palpitations and leg swelling.   Gastrointestinal: Negative for abdominal pain, blood in stool, constipation, diarrhea, nausea, rectal pain and vomiting.   Endocrine: Negative for cold intolerance, heat intolerance, polydipsia and polyuria.   Genitourinary: Negative for decreased urine volume, difficulty urinating, dysuria, frequency, genital sores, hematuria, menstrual problem, pelvic pain, urgency, vaginal bleeding and vaginal  discharge.   Musculoskeletal: Positive for arthralgias, myalgias, neck pain and neck stiffness. Negative for back pain, gait problem and joint swelling.   Skin: Negative for color change, rash and wound.   Allergic/Immunologic: Negative for environmental allergies and food allergies.   Neurological: Negative for dizziness, tremors, seizures, syncope, facial asymmetry, speech difficulty, weakness, light-headedness, numbness and headaches.   Hematological: Negative for adenopathy. Does not bruise/bleed easily.   Psychiatric/Behavioral: Positive for sleep disturbance. Negative for confusion, hallucinations and suicidal ideas. The patient is not nervous/anxious.        Past Medical History:   Diagnosis Date    Carotid stenosis, right     DDD (degenerative disc disease), cervical     Depression     Hyperkalemia     Hypertension     Insomnia     Rhinitis     Synovial cyst of popliteal space       Past Surgical History:   Procedure Laterality Date    ANTERIOR CERVICAL DISCECTOMY W/ FUSION      APPENDECTOMY      CAROTID ENDARTERECTOMY Right 10/03/2018    COLONOSCOPY  2013    KNEE SURGERY Right        Family History   Problem Relation Age of Onset    Hypertension Mother     Coronary artery disease Mother     Lung cancer Father     Hypertension Sister        Social History     Socioeconomic History    Marital status:      Spouse name: Not on file    Number of children: Not on file    Years of education: Not on file    Highest education level: Not on file   Occupational History    Occupation:    Social Needs    Financial resource strain: Not on file    Food insecurity:     Worry: Not on file     Inability: Not on file    Transportation needs:     Medical: Not on file     Non-medical: Not on file   Tobacco Use    Smoking status: Former Smoker    Smokeless tobacco: Never Used   Substance and Sexual Activity    Alcohol use: Yes     Comment: occasional    Drug use: No    Sexual  activity: Yes     Partners: Male     Birth control/protection: None     Comment:    Lifestyle    Physical activity:     Days per week: Not on file     Minutes per session: Not on file    Stress: Only a little   Relationships    Social connections:     Talks on phone: Not on file     Gets together: Not on file     Attends Denominational service: Not on file     Active member of club or organization: Not on file     Attends meetings of clubs or organizations: Not on file     Relationship status: Not on file   Other Topics Concern    Not on file   Social History Narrative    Live with        Current Outpatient Medications   Medication Sig Dispense Refill    albuterol (VENTOLIN HFA) 90 mcg/actuation inhaler Inhale 2 puffs into the lungs daily as needed. 18 g 3    allopurinol (ZYLOPRIM) 300 MG tablet Take 1 tablet (300 mg total) by mouth once daily. 90 tablet 1    amLODIPine (NORVASC) 10 MG tablet Take 1 tablet (10 mg total) by mouth once daily. 90 tablet 1    aspirin 325 MG tablet Take 325 mg by mouth once daily.      cetirizine (ZYRTEC) 10 MG tablet Take 10 mg by mouth once daily.      escitalopram oxalate (LEXAPRO) 10 MG tablet Take 1 tablet (10 mg total) by mouth once daily. 90 tablet 1    HYDROcodone-acetaminophen (NORCO) 5-325 mg per tablet Take 1 tablet by mouth every 6 to 8 hours as needed.      multivitamin (ONE DAILY MULTIVITAMIN) per tablet Take 1 tablet by mouth once daily.      naproxen (NAPROSYN) 500 MG tablet TAKE 1 TABLET(500 MG) BY MOUTH TWICE DAILY 60 tablet 3    ranitidine (ZANTAC) 150 MG tablet TAKE 1 TABLET TWICE A  tablet 1    rosuvastatin (CRESTOR) 10 MG tablet Take 1 tablet (10 mg total) by mouth once daily. 90 tablet 1    valsartan-hydrochlorothiazide (DIOVAN-HCT) 320-25 mg per tablet Take 1 tablet by mouth once daily. 90 tablet 1    zolpidem (AMBIEN) 10 mg Tab Take 1 tablet (10 mg total) by mouth nightly as needed. 90 tablet 0    fluticasone propionate (FLONASE)  "50 mcg/actuation nasal spray 1 spray by Each Nare route once daily.       No current facility-administered medications for this visit.        Review of patient's allergies indicates:  No Known Allergies  Objective:    HPI     Hypertension      Additional comments: continuity of care          Last edited by Devendra Aguirre MA on 2/18/2020  9:39 AM. (History)      Blood pressure 120/70, pulse 71, temperature 98.8 °F (37.1 °C), temperature source Temporal, height 5' 4" (1.626 m), weight 80.3 kg (177 lb), SpO2 98 %. Body mass index is 30.38 kg/m².   Physical Exam   Constitutional: She appears well-developed. No distress.   Obese     HENT:   Nose: Nose normal.   Mouth/Throat: Oropharynx is clear and moist. Tonsils are 2+ on the right. Tonsils are 2+ on the left. No tonsillar exudate.   Eyes: Conjunctivae are normal. Right eye exhibits no discharge. Left eye exhibits no discharge. No scleral icterus.   Neck: Carotid bruit is present.   Cardiovascular: Normal rate, regular rhythm and normal heart sounds.   No murmur heard.  Pulmonary/Chest: Effort normal and breath sounds normal. No respiratory distress. She has no decreased breath sounds. She has no wheezes. She has no rhonchi. She has no rales.   Abdominal: Soft. She exhibits no distension. There is no tenderness. There is no rebound and no guarding.   Musculoskeletal: She exhibits no edema.   Neurological: She is alert. She displays no tremor.   Skin: Skin is warm and dry.   Psychiatric: She has a normal mood and affect. Her speech is normal.   Nursing note and vitals reviewed.          Assessment:       1. Essential hypertension    2. Insomnia, unspecified type        Plan:       Kayla was seen today for hypertension and hyperlipidemia.    Diagnoses and all orders for this visit:    Essential hypertension    Insomnia, unspecified type  -     zolpidem (AMBIEN) 10 mg Tab; Take 1 tablet (10 mg total) by mouth nightly as needed.         "

## 2020-05-12 ENCOUNTER — TELEPHONE (OUTPATIENT)
Dept: FAMILY MEDICINE | Facility: CLINIC | Age: 65
End: 2020-05-12

## 2020-05-12 DIAGNOSIS — R73.01 IMPAIRED FASTING GLUCOSE: ICD-10-CM

## 2020-05-12 DIAGNOSIS — I10 ESSENTIAL HYPERTENSION: Primary | ICD-10-CM

## 2020-05-12 DIAGNOSIS — Z00.00 ENCOUNTER FOR PREVENTATIVE ADULT HEALTH CARE EXAMINATION: ICD-10-CM

## 2020-05-12 DIAGNOSIS — I65.23 CAROTID STENOSIS, ASYMPTOMATIC, BILATERAL: ICD-10-CM

## 2020-05-13 RX ORDER — ROSUVASTATIN CALCIUM 10 MG/1
TABLET, COATED ORAL
Qty: 90 TABLET | Refills: 1 | Status: SHIPPED | OUTPATIENT
Start: 2020-05-13 | End: 2020-06-17 | Stop reason: SDUPTHER

## 2020-05-25 DIAGNOSIS — M10.9 GOUT INVOLVING TOE OF RIGHT FOOT, UNSPECIFIED CAUSE, UNSPECIFIED CHRONICITY: ICD-10-CM

## 2020-05-25 DIAGNOSIS — F32.A DEPRESSION, UNSPECIFIED DEPRESSION TYPE: ICD-10-CM

## 2020-05-26 RX ORDER — ALLOPURINOL 300 MG/1
TABLET ORAL
Qty: 90 TABLET | Refills: 0 | Status: SHIPPED | OUTPATIENT
Start: 2020-05-26 | End: 2020-06-17 | Stop reason: SDUPTHER

## 2020-05-26 RX ORDER — ESCITALOPRAM OXALATE 10 MG/1
TABLET ORAL
Qty: 90 TABLET | Refills: 0 | Status: SHIPPED | OUTPATIENT
Start: 2020-05-26 | End: 2020-06-17 | Stop reason: SDUPTHER

## 2020-06-05 LAB
ALBUMIN SERPL-MCNC: 4.9 G/DL (ref 3.8–4.8)
ALBUMIN/GLOB SERPL: 2.3 {RATIO} (ref 1.2–2.2)
ALP SERPL-CCNC: 87 IU/L (ref 39–117)
ALT SERPL-CCNC: 14 IU/L (ref 0–32)
APPEARANCE UR: CLEAR
AST SERPL-CCNC: 19 IU/L (ref 0–40)
BACTERIA #/AREA URNS HPF: ABNORMAL /[HPF]
BILIRUB SERPL-MCNC: 0.3 MG/DL (ref 0–1.2)
BILIRUB UR QL STRIP: NEGATIVE
BUN SERPL-MCNC: 20 MG/DL (ref 8–27)
BUN/CREAT SERPL: 22 (ref 12–28)
CALCIUM SERPL-MCNC: 10.1 MG/DL (ref 8.7–10.3)
CASTS URNS MICRO: ABNORMAL
CASTS URNS QL MICRO: PRESENT /LPF
CHLORIDE SERPL-SCNC: 102 MMOL/L (ref 96–106)
CHOLEST SERPL-MCNC: 144 MG/DL (ref 100–199)
CO2 SERPL-SCNC: 27 MMOL/L (ref 20–29)
COLOR UR: YELLOW
CREAT SERPL-MCNC: 0.91 MG/DL (ref 0.57–1)
EPI CELLS #/AREA URNS HPF: ABNORMAL /HPF (ref 0–10)
GLOBULIN SER CALC-MCNC: 2.1 G/DL (ref 1.5–4.5)
GLUCOSE SERPL-MCNC: 100 MG/DL (ref 65–99)
GLUCOSE UR QL: NEGATIVE
HBA1C MFR BLD: 5.9 % (ref 4.8–5.6)
HDLC SERPL-MCNC: 58 MG/DL
HGB UR QL STRIP: NEGATIVE
KETONES UR QL STRIP: NEGATIVE
LDLC SERPL CALC-MCNC: 74 MG/DL (ref 0–99)
LEUKOCYTE ESTERASE UR QL STRIP: ABNORMAL
MICRO URNS: ABNORMAL
MUCOUS THREADS URNS QL MICRO: PRESENT
NITRITE UR QL STRIP: NEGATIVE
PH UR STRIP: 5.5 [PH] (ref 5–7.5)
POTASSIUM SERPL-SCNC: 4.3 MMOL/L (ref 3.5–5.2)
PROT SERPL-MCNC: 7 G/DL (ref 6–8.5)
PROT UR QL STRIP: NEGATIVE
RBC #/AREA URNS HPF: ABNORMAL /HPF (ref 0–2)
SODIUM SERPL-SCNC: 142 MMOL/L (ref 134–144)
SP GR UR: 1.02 (ref 1–1.03)
TRIGL SERPL-MCNC: 62 MG/DL (ref 0–149)
UROBILINOGEN UR STRIP-MCNC: 0.2 MG/DL (ref 0.2–1)
VLDLC SERPL CALC-MCNC: 12 MG/DL (ref 5–40)
WBC #/AREA URNS HPF: ABNORMAL /HPF (ref 0–5)

## 2020-06-17 ENCOUNTER — OFFICE VISIT (OUTPATIENT)
Dept: FAMILY MEDICINE | Facility: CLINIC | Age: 65
End: 2020-06-17
Payer: COMMERCIAL

## 2020-06-17 VITALS
BODY MASS INDEX: 30.9 KG/M2 | OXYGEN SATURATION: 98 % | SYSTOLIC BLOOD PRESSURE: 152 MMHG | HEIGHT: 64 IN | DIASTOLIC BLOOD PRESSURE: 80 MMHG | TEMPERATURE: 99 F | HEART RATE: 87 BPM | WEIGHT: 181 LBS

## 2020-06-17 DIAGNOSIS — G47.00 INSOMNIA, UNSPECIFIED TYPE: ICD-10-CM

## 2020-06-17 DIAGNOSIS — F32.A DEPRESSION, UNSPECIFIED DEPRESSION TYPE: ICD-10-CM

## 2020-06-17 DIAGNOSIS — M10.9 GOUT INVOLVING TOE OF RIGHT FOOT, UNSPECIFIED CAUSE, UNSPECIFIED CHRONICITY: ICD-10-CM

## 2020-06-17 DIAGNOSIS — I65.23 CAROTID STENOSIS, ASYMPTOMATIC, BILATERAL: ICD-10-CM

## 2020-06-17 DIAGNOSIS — I10 ESSENTIAL HYPERTENSION: Primary | ICD-10-CM

## 2020-06-17 DIAGNOSIS — R73.01 IMPAIRED FASTING GLUCOSE: ICD-10-CM

## 2020-06-17 PROCEDURE — 99214 OFFICE O/P EST MOD 30 MIN: CPT | Mod: S$GLB,,, | Performed by: INTERNAL MEDICINE

## 2020-06-17 PROCEDURE — 99214 PR OFFICE/OUTPT VISIT, EST, LEVL IV, 30-39 MIN: ICD-10-PCS | Mod: S$GLB,,, | Performed by: INTERNAL MEDICINE

## 2020-06-17 RX ORDER — VALSARTAN AND HYDROCHLOROTHIAZIDE 320; 25 MG/1; MG/1
1 TABLET, FILM COATED ORAL DAILY
Qty: 90 TABLET | Refills: 1 | Status: SHIPPED | OUTPATIENT
Start: 2020-06-17 | End: 2020-12-31

## 2020-06-17 RX ORDER — AMLODIPINE BESYLATE 10 MG/1
10 TABLET ORAL DAILY
Qty: 90 TABLET | Refills: 1 | Status: SHIPPED | OUTPATIENT
Start: 2020-06-17 | End: 2020-12-28

## 2020-06-17 RX ORDER — ROSUVASTATIN CALCIUM 10 MG/1
10 TABLET, COATED ORAL DAILY
Qty: 90 TABLET | Refills: 1 | Status: SHIPPED | OUTPATIENT
Start: 2020-06-17 | End: 2021-01-26 | Stop reason: SDUPTHER

## 2020-06-17 RX ORDER — ALBUTEROL SULFATE 90 UG/1
2 AEROSOL, METERED RESPIRATORY (INHALATION) DAILY PRN
Qty: 18 G | Refills: 3 | Status: SHIPPED | OUTPATIENT
Start: 2020-06-17 | End: 2021-05-04 | Stop reason: SDUPTHER

## 2020-06-17 RX ORDER — ALLOPURINOL 300 MG/1
300 TABLET ORAL DAILY
Qty: 90 TABLET | Refills: 1 | Status: SHIPPED | OUTPATIENT
Start: 2020-06-17 | End: 2021-01-26 | Stop reason: SDUPTHER

## 2020-06-17 RX ORDER — ZOLPIDEM TARTRATE 10 MG/1
10 TABLET ORAL NIGHTLY PRN
Qty: 90 TABLET | Refills: 0 | Status: SHIPPED | OUTPATIENT
Start: 2020-06-17 | End: 2020-09-22 | Stop reason: SDUPTHER

## 2020-06-17 RX ORDER — ESCITALOPRAM OXALATE 20 MG/1
20 TABLET ORAL DAILY
Qty: 90 TABLET | Refills: 1 | Status: SHIPPED | OUTPATIENT
Start: 2020-06-17 | End: 2020-11-30

## 2020-06-17 NOTE — PROGRESS NOTES
Subjective:       Patient ID: Kayla Piper is a 64 y.o. female.    Chief Complaint: Hypertension (3 months followup) and Hyperlipidemia    Here for routine follow up and med refills.  Hasn't been sleeping as well.  Tossing and turning, waking up throughout the night.  Hasn't noticed a change in the appearance of her ambien but has noted change since most recent refill.   However, that also correlates with her return to work and increased stressors.     Hypertension  This is a chronic problem. The problem is controlled (usually better on home monitoring but has been under more stress at work). Associated symptoms include neck pain. Pertinent negatives include no anxiety, chest pain, headaches, palpitations, peripheral edema or shortness of breath. Agents associated with hypertension include estrogens and thyroid hormones (she has been having some sciatica and is hurting today which could be contributing). Risk factors for coronary artery disease include dyslipidemia, obesity and post-menopausal state. Past treatments include diuretics, angiotensin blockers and calcium channel blockers. There are no compliance problems.  Hypertensive end-organ damage includes PVD. Identifiable causes of hypertension include a thyroid problem.   Hyperlipidemia  This is a chronic problem. The problem is controlled. Recent lipid tests were reviewed and are normal. Exacerbating diseases include obesity. Pertinent negatives include no chest pain, myalgias or shortness of breath. Current antihyperlipidemic treatment includes statins. The current treatment provides significant improvement of lipids. There are no compliance problems.      Review of Systems   Constitutional: Negative for activity change, chills, fatigue, fever and unexpected weight change.   HENT: Negative for congestion, hearing loss, postnasal drip, rhinorrhea, trouble swallowing and voice change.    Eyes: Negative for photophobia, discharge and visual  disturbance.   Respiratory: Negative for apnea, cough, choking, chest tightness, shortness of breath and wheezing.    Cardiovascular: Negative for chest pain, palpitations and leg swelling.   Gastrointestinal: Positive for constipation and diarrhea. Negative for abdominal pain, blood in stool, nausea, rectal pain and vomiting.        IBS   Endocrine: Negative for cold intolerance, heat intolerance, polydipsia and polyuria.   Genitourinary: Negative for decreased urine volume, difficulty urinating, dysuria, frequency, genital sores, hematuria, menstrual problem, pelvic pain, urgency, vaginal bleeding and vaginal discharge.   Musculoskeletal: Positive for arthralgias and neck pain. Negative for back pain, gait problem, joint swelling, myalgias and neck stiffness.   Skin: Negative for color change, rash and wound.   Allergic/Immunologic: Negative for environmental allergies and food allergies.   Neurological: Negative for dizziness, tremors, seizures, syncope, facial asymmetry, speech difficulty, weakness, light-headedness, numbness and headaches.   Hematological: Negative for adenopathy. Does not bruise/bleed easily.   Psychiatric/Behavioral: Positive for sleep disturbance. Negative for confusion, dysphoric mood, hallucinations and suicidal ideas. The patient is not nervous/anxious.        Past Medical History:   Diagnosis Date    Carotid stenosis, right     DDD (degenerative disc disease), cervical     Depression     Hyperkalemia     Hypertension     Insomnia     Rhinitis     Synovial cyst of popliteal space       Past Surgical History:   Procedure Laterality Date    ANTERIOR CERVICAL DISCECTOMY W/ FUSION      APPENDECTOMY      CAROTID ENDARTERECTOMY Right 10/03/2018    COLONOSCOPY  2013    KNEE SURGERY Right        Family History   Problem Relation Age of Onset    Hypertension Mother     Coronary artery disease Mother     Lung cancer Father     Hypertension Sister        Social History      Socioeconomic History    Marital status:      Spouse name: Not on file    Number of children: Not on file    Years of education: Not on file    Highest education level: Not on file   Occupational History    Occupation:    Social Needs    Financial resource strain: Not hard at all    Food insecurity     Worry: Never true     Inability: Never true    Transportation needs     Medical: No     Non-medical: No   Tobacco Use    Smoking status: Former Smoker    Smokeless tobacco: Never Used   Substance and Sexual Activity    Alcohol use: Yes     Frequency: Monthly or less     Drinks per session: 1 or 2     Binge frequency: Never     Comment: occasional    Drug use: No    Sexual activity: Yes     Partners: Male     Birth control/protection: None     Comment:    Lifestyle    Physical activity     Days per week: 3 days     Minutes per session: 30 min    Stress: Very much   Relationships    Social connections     Talks on phone: More than three times a week     Gets together: Patient refused     Attends Mandaeism service: Not on file     Active member of club or organization: No     Attends meetings of clubs or organizations: Not on file     Relationship status:    Other Topics Concern    Not on file   Social History Narrative    Live with        Current Outpatient Medications   Medication Sig Dispense Refill    albuterol (VENTOLIN HFA) 90 mcg/actuation inhaler Inhale 2 puffs into the lungs daily as needed. 18 g 3    allopurinoL (ZYLOPRIM) 300 MG tablet Take 1 tablet (300 mg total) by mouth once daily. 90 tablet 1    amLODIPine (NORVASC) 10 MG tablet Take 1 tablet (10 mg total) by mouth once daily. 90 tablet 1    aspirin 325 MG tablet Take 325 mg by mouth once daily.      cetirizine (ZYRTEC) 10 MG tablet Take 10 mg by mouth once daily.      escitalopram oxalate (LEXAPRO) 20 MG tablet Take 1 tablet (20 mg total) by mouth once daily. 90 tablet 1    fluticasone  "propionate (FLONASE) 50 mcg/actuation nasal spray 1 spray by Each Nare route once daily.      multivitamin (ONE DAILY MULTIVITAMIN) per tablet Take 1 tablet by mouth once daily.      rosuvastatin (CRESTOR) 10 MG tablet Take 1 tablet (10 mg total) by mouth once daily. 90 tablet 1    valsartan-hydrochlorothiazide (DIOVAN-HCT) 320-25 mg per tablet Take 1 tablet by mouth once daily. 90 tablet 1    zolpidem (AMBIEN) 10 mg Tab Take 1 tablet (10 mg total) by mouth nightly as needed. 90 tablet 0     No current facility-administered medications for this visit.        Review of patient's allergies indicates:  No Known Allergies  Objective:    HPI     Hypertension      Additional comments: 3 months followup          Last edited by Devendra Aguirre MA on 6/17/2020  3:57 PM. (History)      Blood pressure (!) 152/80, pulse 87, temperature 98.6 °F (37 °C), temperature source Temporal, height 5' 4" (1.626 m), weight 82.1 kg (181 lb), SpO2 98 %. Body mass index is 31.07 kg/m².   Physical Exam  Vitals signs and nursing note reviewed.   Constitutional:       General: She is not in acute distress.     Appearance: She is well-developed. She is obese. She is not ill-appearing, toxic-appearing or diaphoretic.      Comments:      HENT:      Nose: Nose normal.   Eyes:      General: No scleral icterus.        Right eye: No discharge.         Left eye: No discharge.      Conjunctiva/sclera: Conjunctivae normal.   Neck:      Vascular: Carotid bruit present.   Cardiovascular:      Rate and Rhythm: Normal rate and regular rhythm.      Heart sounds: Normal heart sounds. No murmur.   Pulmonary:      Effort: Pulmonary effort is normal. No respiratory distress.      Breath sounds: Normal breath sounds. No decreased breath sounds, wheezing, rhonchi or rales.   Abdominal:      General: There is no distension.      Palpations: Abdomen is soft.      Tenderness: There is no abdominal tenderness. There is no guarding or rebound.   Musculoskeletal:      " Right lower leg: No edema.      Left lower leg: No edema.   Skin:     General: Skin is warm and dry.   Neurological:      Mental Status: She is alert.      Motor: No tremor.   Psychiatric:         Speech: Speech normal.           No visits with results within 1 Month(s) from this visit.   Latest known visit with results is:   Telephone on 05/12/2020   Component Date Value Ref Range Status    Hemoglobin A1C 06/04/2020 5.9* 4.8 - 5.6 % Final    Comment:          Prediabetes: 5.7 - 6.4           Diabetes: >6.4           Glycemic control for adults with diabetes: <7.0      Specific Gravity, UA 06/04/2020 1.017  1.005 - 1.030 Final    pH, UA 06/04/2020 5.5  5.0 - 7.5 Final    Color, UA 06/04/2020 Yellow  Yellow Final    Clarity, UA 06/04/2020 Clear  Clear Final    Leukocytes, UA 06/04/2020 2+* Negative Final    Protein, UA 06/04/2020 Negative  Negative/Trace Final    Glucose, UA 06/04/2020 Negative  Negative Final    Ketones, UA 06/04/2020 Negative  Negative Final    Occult Blood UA 06/04/2020 Negative  Negative Final    Bilirubin, UA 06/04/2020 Negative  Negative Final    Urobilinogen, UA 06/04/2020 0.2  0.2 - 1.0 mg/dL Final    Nitrite, UA 06/04/2020 Negative  Negative Final    Microscopic Examination 06/04/2020 See below:   Final    Microscopic was indicated and was performed.    Glucose 06/04/2020 100* 65 - 99 mg/dL Final    BUN, Bld 06/04/2020 20  8 - 27 mg/dL Final    Creatinine 06/04/2020 0.91  0.57 - 1.00 mg/dL Final    eGFR if non African American 06/04/2020 68  >59 mL/min/1.73 Final    eGFR if African American 06/04/2020 78  >59 mL/min/1.73 Final    BUN/Creatinine Ratio 06/04/2020 22  12 - 28 Final    Sodium 06/04/2020 142  134 - 144 mmol/L Final    Potassium 06/04/2020 4.3  3.5 - 5.2 mmol/L Final    Chloride 06/04/2020 102  96 - 106 mmol/L Final    CO2 06/04/2020 27  20 - 29 mmol/L Final    Calcium 06/04/2020 10.1  8.7 - 10.3 mg/dL Final    Total Protein 06/04/2020 7.0  6.0 - 8.5 g/dL  Final    Albumin 06/04/2020 4.9* 3.8 - 4.8 g/dL Final    Globulin, Total 06/04/2020 2.1  1.5 - 4.5 g/dL Final    Albumin/Globulin Ratio 06/04/2020 2.3* 1.2 - 2.2 Final    Total Bilirubin 06/04/2020 0.3  0.0 - 1.2 mg/dL Final    Alkaline Phosphatase 06/04/2020 87  39 - 117 IU/L Final    AST 06/04/2020 19  0 - 40 IU/L Final    ALT 06/04/2020 14  0 - 32 IU/L Final    Cholesterol 06/04/2020 144  100 - 199 mg/dL Final    Triglycerides 06/04/2020 62  0 - 149 mg/dL Final    HDL 06/04/2020 58  >39 mg/dL Final    VLDL Cholesterol Hans 06/04/2020 12  5 - 40 mg/dL Final    LDL Calculated 06/04/2020 74  0 - 99 mg/dL Final    WBC, UA 06/04/2020 6-10* 0 - 5 /hpf Final    RBC, UA 06/04/2020 0-2  0 - 2 /hpf Final    Epithelial Cells (non renal) 06/04/2020 0-10  0 - 10 /hpf Final    Casts 06/04/2020 Present* None seen /lpf Final    Cast Type 06/04/2020 Hyaline casts  N/A Final    Mucus, UA 06/04/2020 Present  Not Estab. Final    Bacteria, UA 06/04/2020 Few  None seen/Few Final   ]  Assessment:       1. Essential hypertension    2. Gout involving toe of right foot, unspecified cause, unspecified chronicity    3. Depression, unspecified depression type    4. Carotid stenosis, asymptomatic, bilateral    5. Insomnia, unspecified type    6. Impaired fasting glucose        Plan:       Kayla was seen today for hypertension and hyperlipidemia.    Diagnoses and all orders for this visit:    Essential hypertension  Comments:  No changes for now.  Will see if increasing lexapro helps any.  Continue to monitor at home  Orders:  -     amLODIPine (NORVASC) 10 MG tablet; Take 1 tablet (10 mg total) by mouth once daily.  -     valsartan-hydrochlorothiazide (DIOVAN-HCT) 320-25 mg per tablet; Take 1 tablet by mouth once daily.    Gout involving toe of right foot, unspecified cause, unspecified chronicity  -     allopurinoL (ZYLOPRIM) 300 MG tablet; Take 1 tablet (300 mg total) by mouth once daily.    Depression, unspecified  depression type  -     escitalopram oxalate (LEXAPRO) 20 MG tablet; Take 1 tablet (20 mg total) by mouth once daily.    Carotid stenosis, asymptomatic, bilateral  Comments:  s/p right CEA.  She also had currently nonsignificant stenosis on the left; U/S q 6 months; followed by Dr. Méndez  Orders:  -     rosuvastatin (CRESTOR) 10 MG tablet; Take 1 tablet (10 mg total) by mouth once daily.    Insomnia, unspecified type  Comments:  May improve if we can manage her stress response better with increase in lexapro  Orders:  -     zolpidem (AMBIEN) 10 mg Tab; Take 1 tablet (10 mg total) by mouth nightly as needed.    Impaired fasting glucose  Comments:  Reinforced diet.    Other orders  -     albuterol (VENTOLIN HFA) 90 mcg/actuation inhaler; Inhale 2 puffs into the lungs daily as needed.

## 2020-09-22 ENCOUNTER — OFFICE VISIT (OUTPATIENT)
Dept: FAMILY MEDICINE | Facility: CLINIC | Age: 65
End: 2020-09-22
Payer: COMMERCIAL

## 2020-09-22 VITALS
DIASTOLIC BLOOD PRESSURE: 76 MMHG | HEART RATE: 76 BPM | HEIGHT: 64 IN | OXYGEN SATURATION: 97 % | TEMPERATURE: 98 F | SYSTOLIC BLOOD PRESSURE: 130 MMHG | BODY MASS INDEX: 30.39 KG/M2 | WEIGHT: 178 LBS

## 2020-09-22 DIAGNOSIS — G47.00 INSOMNIA, UNSPECIFIED TYPE: ICD-10-CM

## 2020-09-22 DIAGNOSIS — I65.23 CAROTID STENOSIS, ASYMPTOMATIC, BILATERAL: ICD-10-CM

## 2020-09-22 DIAGNOSIS — I10 ESSENTIAL HYPERTENSION: Primary | ICD-10-CM

## 2020-09-22 PROCEDURE — 99214 PR OFFICE/OUTPT VISIT, EST, LEVL IV, 30-39 MIN: ICD-10-PCS | Mod: S$GLB,,, | Performed by: INTERNAL MEDICINE

## 2020-09-22 PROCEDURE — 99214 OFFICE O/P EST MOD 30 MIN: CPT | Mod: S$GLB,,, | Performed by: INTERNAL MEDICINE

## 2020-09-22 RX ORDER — ZOLPIDEM TARTRATE 10 MG/1
10 TABLET ORAL NIGHTLY PRN
Qty: 90 TABLET | Refills: 0 | Status: SHIPPED | OUTPATIENT
Start: 2020-09-22 | End: 2021-01-26 | Stop reason: SDUPTHER

## 2020-09-22 NOTE — PROGRESS NOTES
Subjective:       Patient ID: Kayla Piper is a 65 y.o. female.    Chief Complaint: Hypertension (3 months followup) and Hyperlipidemia    Here for routine follow up and med refills.  Stress has been better and she is sleeping better since dose increased on lexapro.   Doing okay on ambien; no evidence of complex sleep behavior and feels rested in AM.      Hypertension  This is a chronic problem. The problem is controlled (usually better on home monitoring than in the office). Associated symptoms include neck pain. Pertinent negatives include no anxiety, chest pain, headaches, palpitations, peripheral edema or shortness of breath. Agents associated with hypertension include estrogens and thyroid hormones (she has been having some sciatica and is hurting today which could be contributing). Risk factors for coronary artery disease include dyslipidemia, obesity and post-menopausal state. Past treatments include diuretics, angiotensin blockers and calcium channel blockers. There are no compliance problems.  Hypertensive end-organ damage includes PVD. Identifiable causes of hypertension include a thyroid problem.   Hyperlipidemia  This is a chronic problem. The problem is controlled. Recent lipid tests were reviewed and are normal. Exacerbating diseases include obesity. Associated symptoms include myalgias. Pertinent negatives include no chest pain or shortness of breath. Current antihyperlipidemic treatment includes statins. The current treatment provides significant improvement of lipids. There are no compliance problems.      Review of Systems   Constitutional: Negative for chills, fatigue, fever and unexpected weight change.   HENT: Negative for congestion, hearing loss, postnasal drip, rhinorrhea, trouble swallowing and voice change.    Eyes: Negative for photophobia and visual disturbance.   Respiratory: Negative for apnea, cough, choking, chest tightness, shortness of breath and wheezing.     Cardiovascular: Negative for chest pain, palpitations and leg swelling.   Gastrointestinal: Negative for abdominal pain, blood in stool, constipation, diarrhea, nausea, rectal pain and vomiting.   Endocrine: Negative for cold intolerance, heat intolerance, polydipsia and polyuria.   Genitourinary: Negative for decreased urine volume, difficulty urinating, dysuria, frequency, genital sores, hematuria, menstrual problem, pelvic pain, urgency, vaginal bleeding and vaginal discharge.   Musculoskeletal: Positive for arthralgias, myalgias and neck pain. Negative for back pain, gait problem, joint swelling and neck stiffness.   Skin: Negative for color change, rash and wound.   Allergic/Immunologic: Negative for environmental allergies and food allergies.   Neurological: Negative for dizziness, tremors, seizures, syncope, facial asymmetry, speech difficulty, weakness, light-headedness, numbness and headaches.   Hematological: Negative for adenopathy. Does not bruise/bleed easily.   Psychiatric/Behavioral: Positive for sleep disturbance. Negative for confusion, hallucinations and suicidal ideas. The patient is nervous/anxious.        Past Medical History:   Diagnosis Date    Carotid stenosis, right     DDD (degenerative disc disease), cervical     Depression     Hyperkalemia     Hypertension     Insomnia     Rhinitis     Synovial cyst of popliteal space       Past Surgical History:   Procedure Laterality Date    ANTERIOR CERVICAL DISCECTOMY W/ FUSION      APPENDECTOMY      CAROTID ENDARTERECTOMY Right 10/03/2018    COLONOSCOPY  2013    KNEE SURGERY Right        Family History   Problem Relation Age of Onset    Hypertension Mother     Coronary artery disease Mother     Lung cancer Father     Hypertension Sister        Social History     Socioeconomic History    Marital status:      Spouse name: Not on file    Number of children: Not on file    Years of education: Not on file    Highest education  level: Not on file   Occupational History    Occupation:    Social Needs    Financial resource strain: Not hard at all    Food insecurity     Worry: Never true     Inability: Never true    Transportation needs     Medical: No     Non-medical: No   Tobacco Use    Smoking status: Former Smoker    Smokeless tobacco: Never Used   Substance and Sexual Activity    Alcohol use: Yes     Frequency: Monthly or less     Drinks per session: 1 or 2     Binge frequency: Never     Comment: occasional    Drug use: No    Sexual activity: Yes     Partners: Male     Birth control/protection: None     Comment:    Lifestyle    Physical activity     Days per week: 3 days     Minutes per session: 30 min    Stress: To some extent   Relationships    Social connections     Talks on phone: More than three times a week     Gets together: Patient refused     Attends Confucianist service: Not on file     Active member of club or organization: No     Attends meetings of clubs or organizations: Not on file     Relationship status:    Other Topics Concern    Not on file   Social History Narrative    Live with        Current Outpatient Medications   Medication Sig Dispense Refill    albuterol (VENTOLIN HFA) 90 mcg/actuation inhaler Inhale 2 puffs into the lungs daily as needed. 18 g 3    allopurinoL (ZYLOPRIM) 300 MG tablet Take 1 tablet (300 mg total) by mouth once daily. 90 tablet 1    amLODIPine (NORVASC) 10 MG tablet Take 1 tablet (10 mg total) by mouth once daily. 90 tablet 1    aspirin 325 MG tablet Take 325 mg by mouth once daily.      cetirizine (ZYRTEC) 10 MG tablet Take 10 mg by mouth once daily.      escitalopram oxalate (LEXAPRO) 20 MG tablet Take 1 tablet (20 mg total) by mouth once daily. 90 tablet 1    fluticasone propionate (FLONASE) 50 mcg/actuation nasal spray 1 spray by Each Nare route once daily.      multivitamin (ONE DAILY MULTIVITAMIN) per tablet Take 1 tablet by mouth once  "daily.      rosuvastatin (CRESTOR) 10 MG tablet Take 1 tablet (10 mg total) by mouth once daily. 90 tablet 1    valsartan-hydrochlorothiazide (DIOVAN-HCT) 320-25 mg per tablet Take 1 tablet by mouth once daily. 90 tablet 1    zolpidem (AMBIEN) 10 mg Tab Take 1 tablet (10 mg total) by mouth nightly as needed. 90 tablet 0    mometasone-formoterol (DULERA) 200-5 mcg/actuation inhaler Inhale 2 puffs into the lungs 2 (two) times a day.       No current facility-administered medications for this visit.        Review of patient's allergies indicates:  No Known Allergies  Objective:    HPI     Hypertension      Additional comments: 3 months followup          Last edited by Devendra Aguirre MA on 9/22/2020 10:05 AM. (History)      Blood pressure 130/76, pulse 76, temperature 97.6 °F (36.4 °C), temperature source Temporal, height 5' 4" (1.626 m), weight 80.7 kg (178 lb), SpO2 97 %. Body mass index is 30.55 kg/m².   Physical Exam  Vitals signs and nursing note reviewed.   Constitutional:       General: She is not in acute distress.     Appearance: She is well-developed. She is obese. She is not ill-appearing, toxic-appearing or diaphoretic.      Comments:      HENT:      Nose: Nose normal.   Eyes:      General: No scleral icterus.        Right eye: No discharge.         Left eye: No discharge.      Conjunctiva/sclera: Conjunctivae normal.   Neck:      Vascular: Carotid bruit present.   Cardiovascular:      Rate and Rhythm: Normal rate and regular rhythm.      Heart sounds: Normal heart sounds. No murmur.   Pulmonary:      Effort: Pulmonary effort is normal. No respiratory distress.      Breath sounds: Normal breath sounds. No decreased breath sounds, wheezing, rhonchi or rales.   Abdominal:      General: There is no distension.      Palpations: Abdomen is soft.      Tenderness: There is no abdominal tenderness. There is no guarding or rebound.   Musculoskeletal:      Right lower leg: No edema.      Left lower leg: No edema. "   Skin:     General: Skin is warm and dry.   Neurological:      Mental Status: She is alert.      Motor: No tremor.   Psychiatric:         Speech: Speech normal.           No visits with results within 3 Month(s) from this visit.   Latest known visit with results is:   Telephone on 05/12/2020   Component Date Value Ref Range Status    Hemoglobin A1C 06/04/2020 5.9* 4.8 - 5.6 % Final    Comment:          Prediabetes: 5.7 - 6.4           Diabetes: >6.4           Glycemic control for adults with diabetes: <7.0      Specific Gravity, UA 06/04/2020 1.017  1.005 - 1.030 Final    pH, UA 06/04/2020 5.5  5.0 - 7.5 Final    Color, UA 06/04/2020 Yellow  Yellow Final    Clarity, UA 06/04/2020 Clear  Clear Final    Leukocytes, UA 06/04/2020 2+* Negative Final    Protein, UA 06/04/2020 Negative  Negative/Trace Final    Glucose, UA 06/04/2020 Negative  Negative Final    Ketones, UA 06/04/2020 Negative  Negative Final    Occult Blood UA 06/04/2020 Negative  Negative Final    Bilirubin, UA 06/04/2020 Negative  Negative Final    Urobilinogen, UA 06/04/2020 0.2  0.2 - 1.0 mg/dL Final    Nitrite, UA 06/04/2020 Negative  Negative Final    Microscopic Examination 06/04/2020 See below:   Final    Microscopic was indicated and was performed.    Glucose 06/04/2020 100* 65 - 99 mg/dL Final    BUN, Bld 06/04/2020 20  8 - 27 mg/dL Final    Creatinine 06/04/2020 0.91  0.57 - 1.00 mg/dL Final    eGFR if non African American 06/04/2020 68  >59 mL/min/1.73 Final    eGFR if African American 06/04/2020 78  >59 mL/min/1.73 Final    BUN/Creatinine Ratio 06/04/2020 22  12 - 28 Final    Sodium 06/04/2020 142  134 - 144 mmol/L Final    Potassium 06/04/2020 4.3  3.5 - 5.2 mmol/L Final    Chloride 06/04/2020 102  96 - 106 mmol/L Final    CO2 06/04/2020 27  20 - 29 mmol/L Final    Calcium 06/04/2020 10.1  8.7 - 10.3 mg/dL Final    Total Protein 06/04/2020 7.0  6.0 - 8.5 g/dL Final    Albumin 06/04/2020 4.9* 3.8 - 4.8 g/dL Final     Globulin, Total 06/04/2020 2.1  1.5 - 4.5 g/dL Final    Albumin/Globulin Ratio 06/04/2020 2.3* 1.2 - 2.2 Final    Total Bilirubin 06/04/2020 0.3  0.0 - 1.2 mg/dL Final    Alkaline Phosphatase 06/04/2020 87  39 - 117 IU/L Final    AST 06/04/2020 19  0 - 40 IU/L Final    ALT 06/04/2020 14  0 - 32 IU/L Final    Cholesterol 06/04/2020 144  100 - 199 mg/dL Final    Triglycerides 06/04/2020 62  0 - 149 mg/dL Final    HDL 06/04/2020 58  >39 mg/dL Final    VLDL Cholesterol Hans 06/04/2020 12  5 - 40 mg/dL Final    LDL Calculated 06/04/2020 74  0 - 99 mg/dL Final    WBC, UA 06/04/2020 6-10* 0 - 5 /hpf Final    RBC, UA 06/04/2020 0-2  0 - 2 /hpf Final    Epithelial Cells (non renal) 06/04/2020 0-10  0 - 10 /hpf Final    Casts 06/04/2020 Present* None seen /lpf Final    Cast Type 06/04/2020 Hyaline casts  N/A Final    Mucus, UA 06/04/2020 Present  Not Estab. Final    Bacteria, UA 06/04/2020 Few  None seen/Few Final   ]  Assessment:       1. Essential hypertension    2. Insomnia, unspecified type    3. Carotid stenosis, asymptomatic, bilateral        Plan:       Kayla was seen today for hypertension and hyperlipidemia.    Diagnoses and all orders for this visit:    Essential hypertension  Comments:  Looks good; continue current meds    Insomnia, unspecified type  Comments:  May improve if we can manage her stress response better with increase in lexapro  Orders:  -     zolpidem (AMBIEN) 10 mg Tab; Take 1 tablet (10 mg total) by mouth nightly as needed.    Carotid stenosis, asymptomatic, bilateral  Comments:  She had appt and U/S with Dr Méndez in August; will request records

## 2021-01-26 ENCOUNTER — OFFICE VISIT (OUTPATIENT)
Dept: FAMILY MEDICINE | Facility: CLINIC | Age: 66
End: 2021-01-26
Payer: COMMERCIAL

## 2021-01-26 VITALS
SYSTOLIC BLOOD PRESSURE: 146 MMHG | OXYGEN SATURATION: 96 % | TEMPERATURE: 97 F | DIASTOLIC BLOOD PRESSURE: 76 MMHG | HEART RATE: 76 BPM | HEIGHT: 64 IN | WEIGHT: 189 LBS | BODY MASS INDEX: 32.27 KG/M2

## 2021-01-26 DIAGNOSIS — G47.00 INSOMNIA, UNSPECIFIED TYPE: ICD-10-CM

## 2021-01-26 DIAGNOSIS — M10.9 GOUT INVOLVING TOE OF RIGHT FOOT, UNSPECIFIED CAUSE, UNSPECIFIED CHRONICITY: ICD-10-CM

## 2021-01-26 DIAGNOSIS — I10 WHITE COAT SYNDROME WITH DIAGNOSIS OF HYPERTENSION: Primary | ICD-10-CM

## 2021-01-26 DIAGNOSIS — F32.A DEPRESSION, UNSPECIFIED DEPRESSION TYPE: ICD-10-CM

## 2021-01-26 DIAGNOSIS — I65.23 CAROTID STENOSIS, ASYMPTOMATIC, BILATERAL: ICD-10-CM

## 2021-01-26 DIAGNOSIS — R73.01 IMPAIRED FASTING GLUCOSE: ICD-10-CM

## 2021-01-26 PROCEDURE — 99214 OFFICE O/P EST MOD 30 MIN: CPT | Mod: S$GLB,,, | Performed by: INTERNAL MEDICINE

## 2021-01-26 PROCEDURE — 99214 PR OFFICE/OUTPT VISIT, EST, LEVL IV, 30-39 MIN: ICD-10-PCS | Mod: S$GLB,,, | Performed by: INTERNAL MEDICINE

## 2021-01-26 RX ORDER — PROGESTERONE 200 MG/1
1 CAPSULE ORAL DAILY
COMMUNITY
Start: 2020-12-23

## 2021-01-26 RX ORDER — VALSARTAN AND HYDROCHLOROTHIAZIDE 320; 25 MG/1; MG/1
1 TABLET, FILM COATED ORAL DAILY
Qty: 90 TABLET | Refills: 1 | Status: SHIPPED | OUTPATIENT
Start: 2021-01-26 | End: 2021-05-04 | Stop reason: SDUPTHER

## 2021-01-26 RX ORDER — ROSUVASTATIN CALCIUM 10 MG/1
10 TABLET, COATED ORAL DAILY
Qty: 90 TABLET | Refills: 1 | Status: SHIPPED | OUTPATIENT
Start: 2021-01-26 | End: 2021-05-04 | Stop reason: SDUPTHER

## 2021-01-26 RX ORDER — ALLOPURINOL 300 MG/1
300 TABLET ORAL DAILY
Qty: 90 TABLET | Refills: 1 | Status: SHIPPED | OUTPATIENT
Start: 2021-01-26 | End: 2021-05-04 | Stop reason: SDUPTHER

## 2021-01-26 RX ORDER — ESCITALOPRAM OXALATE 20 MG/1
20 TABLET ORAL DAILY
Qty: 90 TABLET | Refills: 1 | Status: SHIPPED | OUTPATIENT
Start: 2021-01-26 | End: 2021-05-04 | Stop reason: SDUPTHER

## 2021-01-26 RX ORDER — ZOLPIDEM TARTRATE 10 MG/1
10 TABLET ORAL NIGHTLY PRN
Qty: 90 TABLET | Refills: 0 | Status: SHIPPED | OUTPATIENT
Start: 2021-01-26 | End: 2021-05-04 | Stop reason: SDUPTHER

## 2021-01-26 RX ORDER — AMLODIPINE BESYLATE 10 MG/1
10 TABLET ORAL DAILY
Qty: 90 TABLET | Refills: 1 | Status: SHIPPED | OUTPATIENT
Start: 2021-01-26 | End: 2021-05-04 | Stop reason: SDUPTHER

## 2021-03-02 ENCOUNTER — APPOINTMENT (OUTPATIENT)
Dept: LAB | Facility: HOSPITAL | Age: 66
End: 2021-03-02
Attending: INTERNAL MEDICINE
Payer: COMMERCIAL

## 2021-03-02 ENCOUNTER — LAB VISIT (OUTPATIENT)
Dept: LAB | Facility: CLINIC | Age: 66
End: 2021-03-02
Payer: COMMERCIAL

## 2021-03-02 DIAGNOSIS — I10 WHITE COAT SYNDROME WITH DIAGNOSIS OF HYPERTENSION: ICD-10-CM

## 2021-03-02 DIAGNOSIS — M10.9 GOUT INVOLVING TOE OF RIGHT FOOT, UNSPECIFIED CAUSE, UNSPECIFIED CHRONICITY: ICD-10-CM

## 2021-03-02 DIAGNOSIS — R73.01 IMPAIRED FASTING GLUCOSE: ICD-10-CM

## 2021-03-02 LAB
ALBUMIN SERPL BCP-MCNC: 4.3 G/DL (ref 3.5–5.2)
ALP SERPL-CCNC: 87 U/L (ref 55–135)
ALT SERPL W/O P-5'-P-CCNC: 19 U/L (ref 10–44)
ANION GAP SERPL CALC-SCNC: 8 MMOL/L (ref 8–16)
AST SERPL-CCNC: 18 U/L (ref 10–40)
BILIRUB SERPL-MCNC: 0.4 MG/DL (ref 0.1–1)
BUN SERPL-MCNC: 25 MG/DL (ref 8–23)
CALCIUM SERPL-MCNC: 10 MG/DL (ref 8.7–10.5)
CHLORIDE SERPL-SCNC: 105 MMOL/L (ref 95–110)
CO2 SERPL-SCNC: 28 MMOL/L (ref 23–29)
CREAT SERPL-MCNC: 1.1 MG/DL (ref 0.5–1.4)
EST. GFR  (AFRICAN AMERICAN): >60 ML/MIN/1.73 M^2
EST. GFR  (NON AFRICAN AMERICAN): 54 ML/MIN/1.73 M^2
GLUCOSE SERPL-MCNC: 96 MG/DL (ref 70–110)
POTASSIUM SERPL-SCNC: 3.8 MMOL/L (ref 3.5–5.1)
PROT SERPL-MCNC: 7.1 G/DL (ref 6–8.4)
SODIUM SERPL-SCNC: 141 MMOL/L (ref 136–145)
URATE SERPL-MCNC: 5.1 MG/DL (ref 2.4–5.7)

## 2021-03-02 PROCEDURE — 80053 COMPREHEN METABOLIC PANEL: CPT

## 2021-03-02 PROCEDURE — 83036 HEMOGLOBIN GLYCOSYLATED A1C: CPT

## 2021-03-02 PROCEDURE — 84550 ASSAY OF BLOOD/URIC ACID: CPT

## 2021-03-03 LAB
ESTIMATED AVG GLUCOSE: 114 MG/DL (ref 68–131)
HBA1C MFR BLD: 5.6 % (ref 4–5.6)

## 2021-05-04 ENCOUNTER — OFFICE VISIT (OUTPATIENT)
Dept: FAMILY MEDICINE | Facility: CLINIC | Age: 66
End: 2021-05-04
Payer: COMMERCIAL

## 2021-05-04 VITALS
SYSTOLIC BLOOD PRESSURE: 150 MMHG | TEMPERATURE: 98 F | HEART RATE: 80 BPM | OXYGEN SATURATION: 97 % | DIASTOLIC BLOOD PRESSURE: 76 MMHG | HEIGHT: 64 IN | BODY MASS INDEX: 32.27 KG/M2 | WEIGHT: 189 LBS

## 2021-05-04 DIAGNOSIS — M10.9 GOUT INVOLVING TOE OF RIGHT FOOT, UNSPECIFIED CAUSE, UNSPECIFIED CHRONICITY: ICD-10-CM

## 2021-05-04 DIAGNOSIS — I10 WHITE COAT SYNDROME WITH DIAGNOSIS OF HYPERTENSION: Primary | ICD-10-CM

## 2021-05-04 DIAGNOSIS — I65.23 CAROTID STENOSIS, ASYMPTOMATIC, BILATERAL: ICD-10-CM

## 2021-05-04 DIAGNOSIS — F32.A DEPRESSION, UNSPECIFIED DEPRESSION TYPE: ICD-10-CM

## 2021-05-04 DIAGNOSIS — G47.00 INSOMNIA, UNSPECIFIED TYPE: ICD-10-CM

## 2021-05-04 DIAGNOSIS — H93.11 TINNITUS OF RIGHT EAR: ICD-10-CM

## 2021-05-04 PROCEDURE — 99214 OFFICE O/P EST MOD 30 MIN: CPT | Mod: S$GLB,,, | Performed by: INTERNAL MEDICINE

## 2021-05-04 PROCEDURE — 99214 PR OFFICE/OUTPT VISIT, EST, LEVL IV, 30-39 MIN: ICD-10-PCS | Mod: S$GLB,,, | Performed by: INTERNAL MEDICINE

## 2021-05-04 RX ORDER — ALLOPURINOL 300 MG/1
300 TABLET ORAL DAILY
Qty: 90 TABLET | Refills: 1 | Status: SHIPPED | OUTPATIENT
Start: 2021-05-04 | End: 2021-08-10 | Stop reason: SDUPTHER

## 2021-05-04 RX ORDER — VALSARTAN AND HYDROCHLOROTHIAZIDE 320; 25 MG/1; MG/1
1 TABLET, FILM COATED ORAL DAILY
Qty: 90 TABLET | Refills: 1 | Status: SHIPPED | OUTPATIENT
Start: 2021-05-04 | End: 2021-08-10 | Stop reason: SDUPTHER

## 2021-05-04 RX ORDER — ZOLPIDEM TARTRATE 10 MG/1
10 TABLET ORAL NIGHTLY PRN
Qty: 90 TABLET | Refills: 0 | Status: SHIPPED | OUTPATIENT
Start: 2021-05-04 | End: 2021-08-10 | Stop reason: ALTCHOICE

## 2021-05-04 RX ORDER — ESCITALOPRAM OXALATE 20 MG/1
20 TABLET ORAL DAILY
Qty: 90 TABLET | Refills: 1 | Status: SHIPPED | OUTPATIENT
Start: 2021-05-04 | End: 2021-08-10 | Stop reason: SDUPTHER

## 2021-05-04 RX ORDER — AMLODIPINE BESYLATE 10 MG/1
10 TABLET ORAL DAILY
Qty: 90 TABLET | Refills: 1 | Status: SHIPPED | OUTPATIENT
Start: 2021-05-04 | End: 2021-08-10 | Stop reason: SDUPTHER

## 2021-05-04 RX ORDER — ROSUVASTATIN CALCIUM 10 MG/1
10 TABLET, COATED ORAL DAILY
Qty: 90 TABLET | Refills: 1 | Status: SHIPPED | OUTPATIENT
Start: 2021-05-04 | End: 2021-08-10 | Stop reason: SDUPTHER

## 2021-05-04 RX ORDER — ALBUTEROL SULFATE 90 UG/1
2 AEROSOL, METERED RESPIRATORY (INHALATION) DAILY PRN
Qty: 18 G | Refills: 3 | Status: SHIPPED | OUTPATIENT
Start: 2021-05-04 | End: 2022-06-21

## 2021-08-10 ENCOUNTER — OFFICE VISIT (OUTPATIENT)
Dept: FAMILY MEDICINE | Facility: CLINIC | Age: 66
End: 2021-08-10
Payer: COMMERCIAL

## 2021-08-10 VITALS
WEIGHT: 179 LBS | HEIGHT: 64 IN | DIASTOLIC BLOOD PRESSURE: 58 MMHG | OXYGEN SATURATION: 96 % | SYSTOLIC BLOOD PRESSURE: 116 MMHG | TEMPERATURE: 97 F | HEART RATE: 79 BPM | BODY MASS INDEX: 30.56 KG/M2

## 2021-08-10 DIAGNOSIS — I65.23 CAROTID STENOSIS, ASYMPTOMATIC, BILATERAL: ICD-10-CM

## 2021-08-10 DIAGNOSIS — M10.9 GOUT INVOLVING TOE OF RIGHT FOOT, UNSPECIFIED CAUSE, UNSPECIFIED CHRONICITY: ICD-10-CM

## 2021-08-10 DIAGNOSIS — I10 WHITE COAT SYNDROME WITH DIAGNOSIS OF HYPERTENSION: Primary | ICD-10-CM

## 2021-08-10 DIAGNOSIS — F32.A DEPRESSION, UNSPECIFIED DEPRESSION TYPE: ICD-10-CM

## 2021-08-10 DIAGNOSIS — G47.00 INSOMNIA, UNSPECIFIED TYPE: ICD-10-CM

## 2021-08-10 PROCEDURE — 99214 PR OFFICE/OUTPT VISIT, EST, LEVL IV, 30-39 MIN: ICD-10-PCS | Mod: S$GLB,,, | Performed by: INTERNAL MEDICINE

## 2021-08-10 PROCEDURE — 99214 OFFICE O/P EST MOD 30 MIN: CPT | Mod: S$GLB,,, | Performed by: INTERNAL MEDICINE

## 2021-08-10 RX ORDER — ZOLPIDEM TARTRATE 12.5 MG/1
12.5 TABLET, FILM COATED, EXTENDED RELEASE ORAL NIGHTLY PRN
Qty: 30 TABLET | Refills: 3 | Status: SHIPPED | OUTPATIENT
Start: 2021-08-10 | End: 2021-11-23 | Stop reason: SDUPTHER

## 2021-08-10 RX ORDER — PROGESTERONE 200 MG/1
200 CAPSULE ORAL DAILY
Status: CANCELLED | OUTPATIENT
Start: 2021-08-10

## 2021-08-10 RX ORDER — VALSARTAN AND HYDROCHLOROTHIAZIDE 320; 25 MG/1; MG/1
1 TABLET, FILM COATED ORAL DAILY
Qty: 90 TABLET | Refills: 1 | Status: SHIPPED | OUTPATIENT
Start: 2021-08-10 | End: 2021-11-23 | Stop reason: SDUPTHER

## 2021-08-10 RX ORDER — ZOLPIDEM TARTRATE 10 MG/1
10 TABLET ORAL NIGHTLY PRN
Qty: 90 TABLET | Refills: 0 | Status: CANCELLED | OUTPATIENT
Start: 2021-08-10

## 2021-08-10 RX ORDER — PHENTERMINE HYDROCHLORIDE 37.5 MG/1
37.5 TABLET ORAL EVERY MORNING
COMMUNITY
Start: 2021-07-27 | End: 2021-08-10

## 2021-08-10 RX ORDER — ROSUVASTATIN CALCIUM 10 MG/1
10 TABLET, COATED ORAL DAILY
Qty: 90 TABLET | Refills: 1 | Status: SHIPPED | OUTPATIENT
Start: 2021-08-10 | End: 2021-11-23 | Stop reason: SDUPTHER

## 2021-08-10 RX ORDER — ESCITALOPRAM OXALATE 20 MG/1
20 TABLET ORAL DAILY
Qty: 90 TABLET | Refills: 1 | Status: SHIPPED | OUTPATIENT
Start: 2021-08-10 | End: 2021-11-23 | Stop reason: SDUPTHER

## 2021-08-10 RX ORDER — AMLODIPINE BESYLATE 10 MG/1
10 TABLET ORAL DAILY
Qty: 90 TABLET | Refills: 1 | Status: SHIPPED | OUTPATIENT
Start: 2021-08-10 | End: 2021-11-23 | Stop reason: SDUPTHER

## 2021-08-10 RX ORDER — ALLOPURINOL 300 MG/1
300 TABLET ORAL DAILY
Qty: 90 TABLET | Refills: 1 | Status: SHIPPED | OUTPATIENT
Start: 2021-08-10 | End: 2021-11-23 | Stop reason: SDUPTHER

## 2021-11-16 LAB — PAP SMEAR: NORMAL

## 2021-11-23 ENCOUNTER — OFFICE VISIT (OUTPATIENT)
Dept: FAMILY MEDICINE | Facility: CLINIC | Age: 66
End: 2021-11-23
Payer: COMMERCIAL

## 2021-11-23 VITALS
TEMPERATURE: 97 F | BODY MASS INDEX: 30.9 KG/M2 | HEART RATE: 82 BPM | SYSTOLIC BLOOD PRESSURE: 130 MMHG | DIASTOLIC BLOOD PRESSURE: 80 MMHG | OXYGEN SATURATION: 96 % | WEIGHT: 181 LBS | HEIGHT: 64 IN

## 2021-11-23 DIAGNOSIS — I65.23 CAROTID STENOSIS, ASYMPTOMATIC, BILATERAL: ICD-10-CM

## 2021-11-23 DIAGNOSIS — G47.00 INSOMNIA, UNSPECIFIED TYPE: ICD-10-CM

## 2021-11-23 DIAGNOSIS — Z23 NEED FOR PROPHYLACTIC VACCINATION WITH COMBINED DIPHTHERIA-TETANUS-PERTUSSIS (DTP) VACCINE: ICD-10-CM

## 2021-11-23 DIAGNOSIS — F32.A DEPRESSION, UNSPECIFIED DEPRESSION TYPE: ICD-10-CM

## 2021-11-23 DIAGNOSIS — Z12.11 COLON CANCER SCREENING: ICD-10-CM

## 2021-11-23 DIAGNOSIS — M10.9 GOUT INVOLVING TOE OF RIGHT FOOT, UNSPECIFIED CAUSE, UNSPECIFIED CHRONICITY: ICD-10-CM

## 2021-11-23 DIAGNOSIS — I10 WHITE COAT SYNDROME WITH DIAGNOSIS OF HYPERTENSION: Primary | ICD-10-CM

## 2021-11-23 DIAGNOSIS — Z12.31 VISIT FOR SCREENING MAMMOGRAM: ICD-10-CM

## 2021-11-23 PROCEDURE — 99214 PR OFFICE/OUTPT VISIT, EST, LEVL IV, 30-39 MIN: ICD-10-PCS | Mod: 25,S$GLB,, | Performed by: INTERNAL MEDICINE

## 2021-11-23 PROCEDURE — 90715 TDAP VACCINE GREATER THAN OR EQUAL TO 7YO IM: ICD-10-PCS | Mod: S$GLB,,, | Performed by: INTERNAL MEDICINE

## 2021-11-23 PROCEDURE — 90471 TDAP VACCINE GREATER THAN OR EQUAL TO 7YO IM: ICD-10-PCS | Mod: S$GLB,,, | Performed by: INTERNAL MEDICINE

## 2021-11-23 PROCEDURE — 99214 OFFICE O/P EST MOD 30 MIN: CPT | Mod: 25,S$GLB,, | Performed by: INTERNAL MEDICINE

## 2021-11-23 PROCEDURE — 90715 TDAP VACCINE 7 YRS/> IM: CPT | Mod: S$GLB,,, | Performed by: INTERNAL MEDICINE

## 2021-11-23 PROCEDURE — 90471 IMMUNIZATION ADMIN: CPT | Mod: S$GLB,,, | Performed by: INTERNAL MEDICINE

## 2021-11-23 RX ORDER — ZOLPIDEM TARTRATE 12.5 MG/1
12.5 TABLET, FILM COATED, EXTENDED RELEASE ORAL NIGHTLY PRN
Qty: 30 TABLET | Refills: 3 | Status: SHIPPED | OUTPATIENT
Start: 2021-11-23 | End: 2022-06-07

## 2021-11-23 RX ORDER — AMLODIPINE BESYLATE 10 MG/1
10 TABLET ORAL DAILY
Qty: 90 TABLET | Refills: 1 | Status: SHIPPED | OUTPATIENT
Start: 2021-11-23 | End: 2022-06-07 | Stop reason: SDUPTHER

## 2021-11-23 RX ORDER — ALLOPURINOL 300 MG/1
300 TABLET ORAL DAILY
Qty: 90 TABLET | Refills: 1 | Status: SHIPPED | OUTPATIENT
Start: 2021-11-23 | End: 2022-06-06

## 2021-11-23 RX ORDER — ESCITALOPRAM OXALATE 20 MG/1
20 TABLET ORAL DAILY
Qty: 90 TABLET | Refills: 1 | Status: SHIPPED | OUTPATIENT
Start: 2021-11-23 | End: 2022-06-07 | Stop reason: SDUPTHER

## 2021-11-23 RX ORDER — ROSUVASTATIN CALCIUM 10 MG/1
10 TABLET, COATED ORAL DAILY
Qty: 90 TABLET | Refills: 1 | Status: SHIPPED | OUTPATIENT
Start: 2021-11-23 | End: 2022-06-06

## 2021-11-23 RX ORDER — VALSARTAN AND HYDROCHLOROTHIAZIDE 320; 25 MG/1; MG/1
1 TABLET, FILM COATED ORAL DAILY
Qty: 90 TABLET | Refills: 1 | Status: SHIPPED | OUTPATIENT
Start: 2021-11-23 | End: 2022-06-07 | Stop reason: SDUPTHER

## 2021-11-23 RX ORDER — PROGESTERONE 200 MG/1
200 CAPSULE ORAL DAILY
Status: CANCELLED | OUTPATIENT
Start: 2021-11-23

## 2021-12-15 LAB — NONINV COLON CA DNA+OCC BLD SCRN STL QL: NORMAL

## 2021-12-31 LAB — NONINV COLON CA DNA+OCC BLD SCRN STL QL: NORMAL

## 2022-01-13 LAB — NONINV COLON CA DNA+OCC BLD SCRN STL QL: NORMAL

## 2022-01-14 ENCOUNTER — TELEPHONE (OUTPATIENT)
Dept: FAMILY MEDICINE | Facility: CLINIC | Age: 67
End: 2022-01-14
Payer: COMMERCIAL

## 2022-01-14 DIAGNOSIS — Z12.11 COLON CANCER SCREENING: Primary | ICD-10-CM

## 2022-01-14 NOTE — TELEPHONE ENCOUNTER
Pt called stated she got a call from [facility] saying the box she sent from her 3rd test was busted and cannot be used. [facility] told her she will need a new order, in order for a new one to be sent out. Please advise.

## 2022-02-22 ENCOUNTER — OFFICE VISIT (OUTPATIENT)
Dept: FAMILY MEDICINE | Facility: CLINIC | Age: 67
End: 2022-02-22
Payer: COMMERCIAL

## 2022-02-22 VITALS
OXYGEN SATURATION: 97 % | WEIGHT: 179.81 LBS | BODY MASS INDEX: 30.7 KG/M2 | SYSTOLIC BLOOD PRESSURE: 128 MMHG | HEIGHT: 64 IN | RESPIRATION RATE: 18 BRPM | DIASTOLIC BLOOD PRESSURE: 60 MMHG | TEMPERATURE: 98 F | HEART RATE: 72 BPM

## 2022-02-22 DIAGNOSIS — G47.00 INSOMNIA, UNSPECIFIED TYPE: Primary | ICD-10-CM

## 2022-02-22 DIAGNOSIS — M19.90 ARTHRITIS: ICD-10-CM

## 2022-02-22 PROCEDURE — 99213 OFFICE O/P EST LOW 20 MIN: CPT | Mod: S$GLB,,, | Performed by: INTERNAL MEDICINE

## 2022-02-22 PROCEDURE — 99213 PR OFFICE/OUTPT VISIT, EST, LEVL III, 20-29 MIN: ICD-10-PCS | Mod: S$GLB,,, | Performed by: INTERNAL MEDICINE

## 2022-02-22 RX ORDER — CELECOXIB 200 MG/1
200 CAPSULE ORAL DAILY
Qty: 90 CAPSULE | Refills: 1 | Status: SHIPPED | OUTPATIENT
Start: 2022-02-22 | End: 2022-08-05

## 2022-02-22 RX ORDER — ESZOPICLONE 2 MG/1
2 TABLET, FILM COATED ORAL NIGHTLY
Qty: 30 TABLET | Refills: 3 | Status: SHIPPED | OUTPATIENT
Start: 2022-02-22 | End: 2022-03-24

## 2022-02-22 NOTE — PROGRESS NOTES
Subjective:       Patient ID: Kayla Piper is a 66 y.o. female.    Chief Complaint: Follow-up, Hypertension, and Hand Pain (R and L pain; R radiating into wrist.)    Here for routine follow up and med refills.  Hasn't been sleeping well; her ambien is a different generic and it is not working as well as the one she had before.  She has a gout flare every 3-4 months or so; generally responds well to NSAIDs.   Also having more joint pain, especially right thumb.  She is hesitant to take daily NSAIDs; concerned about GI issues.      Review of Systems   Constitutional: Negative for activity change, appetite change, chills, diaphoresis, fatigue, fever, malaise/fatigue and unexpected weight change.   HENT: Positive for tinnitus. Negative for congestion, ear discharge, ear pain, hearing loss, mouth sores, nosebleeds, postnasal drip, rhinorrhea, sinus pressure, sinus pain, sneezing, sore throat, trouble swallowing and voice change.    Eyes: Negative for photophobia, pain, discharge, redness, itching and visual disturbance.   Respiratory: Negative for apnea, cough, choking, chest tightness, shortness of breath and wheezing.    Cardiovascular: Negative for chest pain, palpitations and leg swelling.   Gastrointestinal: Negative for abdominal distention, abdominal pain, blood in stool, constipation, diarrhea, nausea and vomiting.   Endocrine: Negative for cold intolerance, heat intolerance, polydipsia and polyuria.   Genitourinary: Negative for decreased urine volume, difficulty urinating, dyspareunia, dysuria, enuresis, frequency, genital sores, hematuria, menstrual problem, pelvic pain, urgency, vaginal bleeding, vaginal discharge and vaginal pain.   Musculoskeletal: Negative for arthralgias, back pain, gait problem, joint swelling, myalgias, neck pain and neck stiffness.   Skin: Negative for rash and wound.   Allergic/Immunologic: Negative for environmental allergies, food allergies and immunocompromised state.    Neurological: Negative for dizziness, tremors, seizures, syncope, facial asymmetry, speech difficulty, weakness, light-headedness, numbness and headaches.   Hematological: Negative for adenopathy. Does not bruise/bleed easily.   Psychiatric/Behavioral: Positive for sleep disturbance. Negative for confusion, decreased concentration, hallucinations, self-injury and suicidal ideas. The patient is not nervous/anxious.        Past Medical History:   Diagnosis Date    Carotid stenosis, right     DDD (degenerative disc disease), cervical     Depression     Hyperkalemia     Hypertension     Insomnia     Rhinitis     Synovial cyst of popliteal space       Past Surgical History:   Procedure Laterality Date    ANTERIOR CERVICAL DISCECTOMY W/ FUSION      APPENDECTOMY      CAROTID ENDARTERECTOMY Right 10/03/2018    COLONOSCOPY  2013    KNEE SURGERY Right        Family History   Problem Relation Age of Onset    Hypertension Mother     Coronary artery disease Mother     Lung cancer Father     Hypertension Sister        Social History     Socioeconomic History    Marital status:    Occupational History    Occupation:    Tobacco Use    Smoking status: Former Smoker    Smokeless tobacco: Never Used   Substance and Sexual Activity    Alcohol use: Yes     Comment: occasional    Drug use: No    Sexual activity: Yes     Partners: Male     Birth control/protection: None     Comment:    Social History Narrative    Live with        Current Outpatient Medications   Medication Sig Dispense Refill    albuterol (VENTOLIN HFA) 90 mcg/actuation inhaler Inhale 2 puffs into the lungs daily as needed. 18 g 3    allopurinoL (ZYLOPRIM) 300 MG tablet Take 1 tablet (300 mg total) by mouth once daily. 90 tablet 1    amLODIPine (NORVASC) 10 MG tablet Take 1 tablet (10 mg total) by mouth once daily. 90 tablet 1    aspirin 325 MG tablet Take 325 mg by mouth once daily.      cetirizine  "(ZYRTEC) 10 MG tablet Take 10 mg by mouth once daily.      EScitalopram oxalate (LEXAPRO) 20 MG tablet Take 1 tablet (20 mg total) by mouth once daily. 90 tablet 1    fluticasone propionate (FLONASE) 50 mcg/actuation nasal spray 1 spray by Each Nare route once daily.      multivitamin (THERAGRAN) per tablet Take 1 tablet by mouth once daily.      progesterone (PROMETRIUM) 200 MG capsule Take 1 capsule by mouth once daily.      rosuvastatin (CRESTOR) 10 MG tablet Take 1 tablet (10 mg total) by mouth once daily. 90 tablet 1    valsartan-hydrochlorothiazide (DIOVAN-HCT) 320-25 mg per tablet Take 1 tablet by mouth once daily. 90 tablet 1    zolpidem (AMBIEN CR) 12.5 MG CR tablet Take 1 tablet (12.5 mg total) by mouth nightly as needed for Insomnia. 30 tablet 3    celecoxib (CELEBREX) 200 MG capsule Take 1 capsule (200 mg total) by mouth once daily. 90 capsule 1    eszopiclone (LUNESTA) 2 MG Tab Take 1 tablet (2 mg total) by mouth every evening. 30 tablet 3     No current facility-administered medications for this visit.       Review of patient's allergies indicates:  No Known Allergies  Objective:      Blood pressure 128/60, pulse 72, temperature 98.2 °F (36.8 °C), resp. rate 18, height 5' 4" (1.626 m), weight 81.6 kg (179 lb 12.8 oz), SpO2 97 %. Body mass index is 30.86 kg/m².   Physical Exam  Vitals and nursing note reviewed.   Constitutional:       General: She is not in acute distress.     Appearance: She is well-developed. She is obese. She is not ill-appearing, toxic-appearing or diaphoretic.   HENT:      Head: Normocephalic and atraumatic.   Eyes:      General: No scleral icterus.        Right eye: No discharge.         Left eye: No discharge.      Conjunctiva/sclera: Conjunctivae normal.   Neck:      Vascular: Carotid bruit (right) present.   Cardiovascular:      Rate and Rhythm: Normal rate and regular rhythm.      Heart sounds: Normal heart sounds. No murmur heard.  Pulmonary:      Effort: Pulmonary " effort is normal. No respiratory distress.      Breath sounds: Normal breath sounds. No decreased breath sounds, wheezing, rhonchi or rales.   Abdominal:      General: There is no distension.      Palpations: Abdomen is soft.      Tenderness: There is no abdominal tenderness. There is no guarding or rebound.   Musculoskeletal:      Right lower leg: No edema.      Left lower leg: No edema.   Skin:     General: Skin is warm and dry.   Neurological:      Mental Status: She is alert.      Motor: No tremor.   Psychiatric:         Mood and Affect: Mood normal.         Speech: Speech normal.         Behavior: Behavior normal.             Assessment:       1. Insomnia, unspecified type    2. Arthritis        Plan:       Kayla was seen today for follow-up, hypertension and hand pain.    Diagnoses and all orders for this visit:    Insomnia, unspecified type  Comments:  Can try talking to pharmacy about going back to previous generic.  In the meantime, can try switching to lunesta  Orders:  -     eszopiclone (LUNESTA) 2 MG Tab; Take 1 tablet (2 mg total) by mouth every evening.    Arthritis  -     celecoxib (CELEBREX) 200 MG capsule; Take 1 capsule (200 mg total) by mouth once daily.

## 2022-02-23 ENCOUNTER — PATIENT MESSAGE (OUTPATIENT)
Dept: FAMILY MEDICINE | Facility: CLINIC | Age: 67
End: 2022-02-23
Payer: COMMERCIAL

## 2022-02-23 ENCOUNTER — TELEPHONE (OUTPATIENT)
Dept: FAMILY MEDICINE | Facility: CLINIC | Age: 67
End: 2022-02-23
Payer: COMMERCIAL

## 2022-02-23 NOTE — TELEPHONE ENCOUNTER
The following medication needs a prior authorization:     Medication Name: celecoxib    Dosage: 200 mg    Frequency: daily    Directions for use: Take 1 capsule (200 mg total) by mouth once daily    Diagnosis: arthritis    Is the request for a reauthorization? no    Is the patient currently stable on therapy?     Please list all therapeutic alternatives previously used with start/end dates and outcome:

## 2022-06-07 ENCOUNTER — OFFICE VISIT (OUTPATIENT)
Dept: FAMILY MEDICINE | Facility: CLINIC | Age: 67
End: 2022-06-07
Payer: COMMERCIAL

## 2022-06-07 VITALS
HEIGHT: 64 IN | RESPIRATION RATE: 18 BRPM | HEART RATE: 85 BPM | OXYGEN SATURATION: 99 % | BODY MASS INDEX: 31.24 KG/M2 | TEMPERATURE: 98 F | DIASTOLIC BLOOD PRESSURE: 80 MMHG | WEIGHT: 183 LBS | SYSTOLIC BLOOD PRESSURE: 150 MMHG

## 2022-06-07 DIAGNOSIS — I10 WHITE COAT SYNDROME WITH DIAGNOSIS OF HYPERTENSION: ICD-10-CM

## 2022-06-07 DIAGNOSIS — M79.605 PAIN OF LEFT LOWER EXTREMITY: ICD-10-CM

## 2022-06-07 DIAGNOSIS — M10.9 GOUT INVOLVING TOE OF RIGHT FOOT, UNSPECIFIED CAUSE, UNSPECIFIED CHRONICITY: ICD-10-CM

## 2022-06-07 DIAGNOSIS — I10 WHITE COAT SYNDROME WITH DIAGNOSIS OF HYPERTENSION: Primary | ICD-10-CM

## 2022-06-07 DIAGNOSIS — G47.00 INSOMNIA, UNSPECIFIED TYPE: ICD-10-CM

## 2022-06-07 DIAGNOSIS — I10 ESSENTIAL HYPERTENSION: ICD-10-CM

## 2022-06-07 DIAGNOSIS — F32.A DEPRESSION, UNSPECIFIED DEPRESSION TYPE: ICD-10-CM

## 2022-06-07 DIAGNOSIS — R73.01 IMPAIRED FASTING GLUCOSE: ICD-10-CM

## 2022-06-07 PROCEDURE — 99214 OFFICE O/P EST MOD 30 MIN: CPT | Mod: S$GLB,,, | Performed by: INTERNAL MEDICINE

## 2022-06-07 PROCEDURE — 99214 PR OFFICE/OUTPT VISIT, EST, LEVL IV, 30-39 MIN: ICD-10-PCS | Mod: S$GLB,,, | Performed by: INTERNAL MEDICINE

## 2022-06-07 RX ORDER — VALSARTAN AND HYDROCHLOROTHIAZIDE 320; 25 MG/1; MG/1
1 TABLET, FILM COATED ORAL DAILY
Qty: 90 TABLET | Refills: 1 | Status: SHIPPED | OUTPATIENT
Start: 2022-06-07 | End: 2022-12-22 | Stop reason: SDUPTHER

## 2022-06-07 RX ORDER — ESCITALOPRAM OXALATE 20 MG/1
20 TABLET ORAL DAILY
Qty: 90 TABLET | Refills: 1 | Status: SHIPPED | OUTPATIENT
Start: 2022-06-07 | End: 2022-12-12

## 2022-06-07 RX ORDER — VALSARTAN AND HYDROCHLOROTHIAZIDE 320; 25 MG/1; MG/1
1 TABLET, FILM COATED ORAL DAILY
Qty: 90 TABLET | Refills: 1 | Status: SHIPPED | OUTPATIENT
Start: 2022-06-07 | End: 2022-06-07 | Stop reason: SDUPTHER

## 2022-06-07 RX ORDER — AMLODIPINE BESYLATE 10 MG/1
10 TABLET ORAL DAILY
Qty: 90 TABLET | Refills: 1 | Status: SHIPPED | OUTPATIENT
Start: 2022-06-07 | End: 2022-06-07 | Stop reason: SDUPTHER

## 2022-06-07 RX ORDER — GABAPENTIN 300 MG/1
300 CAPSULE ORAL NIGHTLY
Qty: 30 CAPSULE | Refills: 3 | Status: SHIPPED | OUTPATIENT
Start: 2022-06-07 | End: 2022-09-27 | Stop reason: SDUPTHER

## 2022-06-07 RX ORDER — ESCITALOPRAM OXALATE 20 MG/1
20 TABLET ORAL DAILY
Qty: 90 TABLET | Refills: 1 | Status: SHIPPED | OUTPATIENT
Start: 2022-06-07 | End: 2022-06-07 | Stop reason: SDUPTHER

## 2022-06-07 RX ORDER — AMLODIPINE BESYLATE 10 MG/1
10 TABLET ORAL DAILY
Qty: 90 TABLET | Refills: 1 | Status: SHIPPED | OUTPATIENT
Start: 2022-06-07 | End: 2022-12-22 | Stop reason: SDUPTHER

## 2022-06-07 RX ORDER — ZOLPIDEM TARTRATE 10 MG/1
10 TABLET ORAL NIGHTLY PRN
Qty: 30 TABLET | Refills: 3 | Status: SHIPPED | OUTPATIENT
Start: 2022-06-07 | End: 2022-09-27 | Stop reason: ALTCHOICE

## 2022-06-07 RX ORDER — ESZOPICLONE 2 MG/1
2 TABLET, FILM COATED ORAL NIGHTLY PRN
COMMUNITY
Start: 2022-05-12 | End: 2022-06-07

## 2022-06-07 NOTE — PROGRESS NOTES
Subjective:       Patient ID: Kayla Piper is a 66 y.o. female.    Chief Complaint: Follow-up and Hypertension (HTN and med refills)    Here for routine follow up; last visit note, most recent available labs, and health maintenance topics reviewed.   Hasn't been sleeping well; her ambien is a different generic and it is not working as well as the one she had before.  We tried switching to lunesta but it didn't work at all.  Her  has regular ambien 10mg for when he works night and she has had better results with that.  She has a gout flare every 3-4 months or so; generally responds well to NSAIDs.   Has been noticing some discomfort in left leg recently.  Burning/tinling mostly in 5th toe but can have it as fasr up as mid thigh.  Mostly notices with with laying down and seems to improve if gets up and walks around or takes a warm bath.  Denies back pain but has had some issues with sciatica.   Mood stable.    Hypertension  This is a chronic problem. The problem is controlled (fine on home monitoring and at recent GYN visit; 120s/80s). Associated symptoms include anxiety (especially when she comes to the office) and neck pain. Pertinent negatives include no chest pain, headaches, malaise/fatigue, palpitations, peripheral edema or shortness of breath. Agents associated with hypertension include estrogens and thyroid hormones (has been using albuterol recently). Risk factors for coronary artery disease include dyslipidemia, obesity and post-menopausal state. Past treatments include diuretics, angiotensin blockers, calcium channel blockers and lifestyle changes. There are no compliance problems.  Hypertensive end-organ damage includes PVD. Identifiable causes of hypertension include a thyroid problem.   Hyperlipidemia  This is a chronic problem. The problem is controlled. Recent lipid tests were reviewed and are normal. Exacerbating diseases include obesity. Pertinent negatives include no chest pain,  myalgias or shortness of breath. Current antihyperlipidemic treatment includes statins. The current treatment provides significant improvement of lipids. There are no compliance problems.      Review of Systems   Constitutional: Negative for activity change, appetite change, chills, diaphoresis, fatigue, fever, malaise/fatigue and unexpected weight change.   HENT: Positive for hearing loss and rhinorrhea. Negative for congestion, ear discharge, ear pain, mouth sores, nosebleeds, postnasal drip, sinus pressure, sinus pain, sneezing, sore throat, tinnitus, trouble swallowing and voice change.    Eyes: Negative for photophobia, pain, discharge, redness, itching and visual disturbance.   Respiratory: Positive for chest tightness and wheezing. Negative for apnea, cough, choking and shortness of breath.    Cardiovascular: Negative for chest pain, palpitations and leg swelling.   Gastrointestinal: Negative for abdominal distention, abdominal pain, blood in stool, constipation, diarrhea, nausea and vomiting.   Endocrine: Negative for cold intolerance, heat intolerance, polydipsia and polyuria.   Genitourinary: Negative for decreased urine volume, difficulty urinating, dyspareunia, dysuria, enuresis, frequency, genital sores, hematuria, menstrual problem, pelvic pain, urgency, vaginal bleeding, vaginal discharge and vaginal pain.   Musculoskeletal: Positive for arthralgias and neck pain. Negative for back pain, gait problem, joint swelling, myalgias and neck stiffness.   Skin: Negative for rash and wound.   Allergic/Immunologic: Negative for environmental allergies, food allergies and immunocompromised state.   Neurological: Negative for dizziness, tremors, seizures, syncope, facial asymmetry, speech difficulty, weakness, light-headedness, numbness and headaches.   Hematological: Negative for adenopathy. Does not bruise/bleed easily.   Psychiatric/Behavioral: Negative for confusion, decreased concentration, dysphoric mood,  hallucinations, self-injury, sleep disturbance and suicidal ideas. The patient is not nervous/anxious.        Past Medical History:   Diagnosis Date    Carotid stenosis, right     DDD (degenerative disc disease), cervical     Depression     Hyperkalemia     Hypertension     Insomnia     Rhinitis     Synovial cyst of popliteal space       Past Surgical History:   Procedure Laterality Date    ANTERIOR CERVICAL DISCECTOMY W/ FUSION      APPENDECTOMY      CAROTID ENDARTERECTOMY Right 10/03/2018    COLONOSCOPY  2013    KNEE SURGERY Right        Family History   Problem Relation Age of Onset    Hypertension Mother     Coronary artery disease Mother     Lung cancer Father     Hypertension Sister        Social History     Socioeconomic History    Marital status:    Occupational History    Occupation:    Tobacco Use    Smoking status: Former Smoker    Smokeless tobacco: Never Used   Substance and Sexual Activity    Alcohol use: Yes     Comment: occasional    Drug use: No    Sexual activity: Yes     Partners: Male     Birth control/protection: None     Comment:    Social History Narrative    Live with      Social Determinants of Health     Financial Resource Strain: Low Risk     Difficulty of Paying Living Expenses: Not hard at all   Food Insecurity: No Food Insecurity    Worried About Running Out of Food in the Last Year: Never true    Ran Out of Food in the Last Year: Never true   Transportation Needs: No Transportation Needs    Lack of Transportation (Medical): No    Lack of Transportation (Non-Medical): No   Physical Activity: Insufficiently Active    Days of Exercise per Week: 4 days    Minutes of Exercise per Session: 30 min   Stress: Stress Concern Present    Feeling of Stress : Very much   Social Connections: Unknown    Frequency of Communication with Friends and Family: More than three times a week    Frequency of Social Gatherings with Friends and  Family: Patient refused    Active Member of Clubs or Organizations: Yes    Attends Club or Organization Meetings: Patient refused    Marital Status:    Housing Stability: Low Risk     Unable to Pay for Housing in the Last Year: No    Number of Places Lived in the Last Year: 1    Unstable Housing in the Last Year: No       Current Outpatient Medications   Medication Sig Dispense Refill    albuterol (VENTOLIN HFA) 90 mcg/actuation inhaler Inhale 2 puffs into the lungs daily as needed. 18 g 3    allopurinoL (ZYLOPRIM) 300 MG tablet TAKE 1 TABLET DAILY 90 tablet 1    aspirin 325 MG tablet Take 325 mg by mouth once daily.      celecoxib (CELEBREX) 200 MG capsule Take 1 capsule (200 mg total) by mouth once daily. 90 capsule 1    cetirizine (ZYRTEC) 10 MG tablet Take 10 mg by mouth once daily.      fluticasone propionate (FLONASE) 50 mcg/actuation nasal spray 1 spray by Each Nare route once daily.      multivitamin (THERAGRAN) per tablet Take 1 tablet by mouth once daily.      progesterone (PROMETRIUM) 200 MG capsule Take 1 capsule by mouth once daily.      rosuvastatin (CRESTOR) 10 MG tablet TAKE 1 TABLET DAILY 90 tablet 1    amLODIPine (NORVASC) 10 MG tablet Take 1 tablet (10 mg total) by mouth once daily. 90 tablet 1    EScitalopram oxalate (LEXAPRO) 20 MG tablet Take 1 tablet (20 mg total) by mouth once daily. 90 tablet 1    gabapentin (NEURONTIN) 300 MG capsule Take 1 capsule (300 mg total) by mouth every evening. 30 capsule 3    valsartan-hydrochlorothiazide (DIOVAN-HCT) 320-25 mg per tablet Take 1 tablet by mouth once daily. 90 tablet 1    zolpidem (AMBIEN) 10 mg Tab Take 1 tablet (10 mg total) by mouth nightly as needed (insomnia). 30 tablet 3     No current facility-administered medications for this visit.       Review of patient's allergies indicates:  No Known Allergies  Objective:    HPI     Hypertension      Additional comments: HTN and med refills          Last edited by Maegan  "Hugh STANFORDN on 6/7/2022  3:50 PM. (History)      Blood pressure (!) 150/80, pulse 85, temperature 98.2 °F (36.8 °C), resp. rate 18, height 5' 4" (1.626 m), weight 83 kg (183 lb), SpO2 99 %. Body mass index is 31.41 kg/m².   Physical Exam  Vitals and nursing note reviewed.   Constitutional:       General: She is not in acute distress.     Appearance: She is well-developed. She is obese. She is not ill-appearing, toxic-appearing or diaphoretic.   HENT:      Head: Normocephalic and atraumatic.   Eyes:      General: No scleral icterus.        Right eye: No discharge.         Left eye: No discharge.      Conjunctiva/sclera: Conjunctivae normal.   Neck:      Vascular: Carotid bruit (right) present.   Cardiovascular:      Rate and Rhythm: Normal rate and regular rhythm.      Heart sounds: Normal heart sounds. No murmur heard.  Pulmonary:      Effort: Pulmonary effort is normal. No respiratory distress.      Breath sounds: Normal breath sounds. No decreased breath sounds, wheezing, rhonchi or rales.   Abdominal:      General: There is no distension.      Palpations: Abdomen is soft.      Tenderness: There is no abdominal tenderness. There is no guarding or rebound.   Musculoskeletal:      Right lower leg: No edema.      Left lower leg: No edema.   Skin:     General: Skin is warm and dry.   Neurological:      Mental Status: She is alert.      Motor: No tremor.   Psychiatric:         Mood and Affect: Mood normal.         Speech: Speech normal.         Behavior: Behavior normal.             Assessment:       1. White coat syndrome with diagnosis of hypertension    2. Gout involving toe of right foot, unspecified cause, unspecified chronicity    3. Insomnia, unspecified type    4. Impaired fasting glucose    5. Essential hypertension    6. Depression, unspecified depression type    7. Pain of left lower extremity    8. White coat syndrome with diagnosis of hypertension        Plan:       Kayla was seen today for follow-up and " hypertension.    Diagnoses and all orders for this visit:    White coat syndrome with diagnosis of hypertension  Comments:  No changes.  Continue to monitor at home  Orders:  -     Comprehensive Metabolic Panel; Future  -     Lipid Panel; Future  -     Urinalysis; Future  -     amLODIPine (NORVASC) 10 MG tablet; Take 1 tablet (10 mg total) by mouth once daily.  -     valsartan-hydrochlorothiazide (DIOVAN-HCT) 320-25 mg per tablet; Take 1 tablet by mouth once daily.    Gout involving toe of right foot, unspecified cause, unspecified chronicity  -     Uric Acid; Future    Insomnia, unspecified type  -     zolpidem (AMBIEN) 10 mg Tab; Take 1 tablet (10 mg total) by mouth nightly as needed (insomnia).    Impaired fasting glucose  -     Hemoglobin A1C; Future    Essential hypertension  -     Comprehensive Metabolic Panel; Future  -     Lipid Panel; Future  -     Urinalysis; Future    Depression, unspecified depression type  Comments:  Stable  Orders:  -     EScitalopram oxalate (LEXAPRO) 20 MG tablet; Take 1 tablet (20 mg total) by mouth once daily.    Pain of left lower extremity  Comments:  Sciatica vs RLS.  Sounds more like sciatica so will give a trial of gabapentin  Orders:  -     gabapentin (NEURONTIN) 300 MG capsule; Take 1 capsule (300 mg total) by mouth every evening.    White coat syndrome with diagnosis of hypertension  Comments:  Wel controlled.  Continue to monitor at home.    Orders:  -     Comprehensive Metabolic Panel; Future  -     Lipid Panel; Future  -     Urinalysis; Future  -     amLODIPine (NORVASC) 10 MG tablet; Take 1 tablet (10 mg total) by mouth once daily.  -     valsartan-hydrochlorothiazide (DIOVAN-HCT) 320-25 mg per tablet; Take 1 tablet by mouth once daily.

## 2022-07-05 ENCOUNTER — PATIENT MESSAGE (OUTPATIENT)
Dept: FAMILY MEDICINE | Facility: CLINIC | Age: 67
End: 2022-07-05

## 2022-07-05 ENCOUNTER — LAB VISIT (OUTPATIENT)
Dept: LAB | Facility: CLINIC | Age: 67
End: 2022-07-05
Payer: COMMERCIAL

## 2022-07-05 DIAGNOSIS — I10 WHITE COAT SYNDROME WITH DIAGNOSIS OF HYPERTENSION: ICD-10-CM

## 2022-07-05 DIAGNOSIS — M10.9 GOUT INVOLVING TOE OF RIGHT FOOT, UNSPECIFIED CAUSE, UNSPECIFIED CHRONICITY: ICD-10-CM

## 2022-07-05 DIAGNOSIS — I10 ESSENTIAL HYPERTENSION: ICD-10-CM

## 2022-07-05 DIAGNOSIS — R73.01 IMPAIRED FASTING GLUCOSE: ICD-10-CM

## 2022-07-05 LAB
ALBUMIN SERPL BCP-MCNC: 4.4 G/DL (ref 3.5–5.2)
ALP SERPL-CCNC: 68 U/L (ref 55–135)
ALT SERPL W/O P-5'-P-CCNC: 22 U/L (ref 10–44)
ANION GAP SERPL CALC-SCNC: 6 MMOL/L (ref 8–16)
AST SERPL-CCNC: 19 U/L (ref 10–40)
BACTERIA #/AREA URNS HPF: ABNORMAL /HPF
BILIRUB SERPL-MCNC: 0.5 MG/DL (ref 0.1–1)
BILIRUB UR QL STRIP: NEGATIVE
BUN SERPL-MCNC: 25 MG/DL (ref 8–23)
CALCIUM SERPL-MCNC: 11.1 MG/DL (ref 8.7–10.5)
CHLORIDE SERPL-SCNC: 102 MMOL/L (ref 95–110)
CHOLEST SERPL-MCNC: 136 MG/DL (ref 120–199)
CHOLEST/HDLC SERPL: 2.9 {RATIO} (ref 2–5)
CLARITY UR: ABNORMAL
CO2 SERPL-SCNC: 32 MMOL/L (ref 23–29)
COLOR UR: YELLOW
CREAT SERPL-MCNC: 1 MG/DL (ref 0.5–1.4)
EST. GFR  (AFRICAN AMERICAN): >60 ML/MIN/1.73 M^2
EST. GFR  (NON AFRICAN AMERICAN): 60 ML/MIN/1.73 M^2
ESTIMATED AVG GLUCOSE: 117 MG/DL (ref 68–131)
GLUCOSE SERPL-MCNC: 103 MG/DL (ref 70–110)
GLUCOSE UR QL STRIP: NEGATIVE
HBA1C MFR BLD: 5.7 % (ref 4–5.6)
HDLC SERPL-MCNC: 47 MG/DL (ref 40–75)
HDLC SERPL: 34.6 % (ref 20–50)
HGB UR QL STRIP: ABNORMAL
KETONES UR QL STRIP: NEGATIVE
LDLC SERPL CALC-MCNC: 75.2 MG/DL (ref 63–159)
LEUKOCYTE ESTERASE UR QL STRIP: ABNORMAL
MICROSCOPIC COMMENT: ABNORMAL
NITRITE UR QL STRIP: POSITIVE
NONHDLC SERPL-MCNC: 89 MG/DL
PH UR STRIP: 6 [PH] (ref 5–8)
POTASSIUM SERPL-SCNC: 4.9 MMOL/L (ref 3.5–5.1)
PROT SERPL-MCNC: 7.4 G/DL (ref 6–8.4)
PROT UR QL STRIP: NEGATIVE
RBC #/AREA URNS HPF: 4 /HPF (ref 0–4)
SODIUM SERPL-SCNC: 140 MMOL/L (ref 136–145)
SP GR UR STRIP: >=1.03 (ref 1–1.03)
SQUAMOUS #/AREA URNS HPF: 3 /HPF
TRIGL SERPL-MCNC: 69 MG/DL (ref 30–150)
URATE SERPL-MCNC: 7.3 MG/DL (ref 2.4–5.7)
URN SPEC COLLECT METH UR: ABNORMAL
UROBILINOGEN UR STRIP-ACNC: NEGATIVE EU/DL
WBC #/AREA URNS HPF: 64 /HPF (ref 0–5)

## 2022-07-05 PROCEDURE — 80061 LIPID PANEL: CPT | Performed by: INTERNAL MEDICINE

## 2022-07-05 PROCEDURE — 36415 PR COLLECTION VENOUS BLOOD,VENIPUNCTURE: ICD-10-PCS | Mod: PN,,, | Performed by: STUDENT IN AN ORGANIZED HEALTH CARE EDUCATION/TRAINING PROGRAM

## 2022-07-05 PROCEDURE — 81000 URINALYSIS NONAUTO W/SCOPE: CPT | Performed by: INTERNAL MEDICINE

## 2022-07-05 PROCEDURE — 80053 COMPREHEN METABOLIC PANEL: CPT | Performed by: INTERNAL MEDICINE

## 2022-07-05 PROCEDURE — 83036 HEMOGLOBIN GLYCOSYLATED A1C: CPT | Performed by: INTERNAL MEDICINE

## 2022-07-05 PROCEDURE — 84550 ASSAY OF BLOOD/URIC ACID: CPT | Performed by: INTERNAL MEDICINE

## 2022-07-05 PROCEDURE — 36415 COLL VENOUS BLD VENIPUNCTURE: CPT | Mod: PN,,, | Performed by: STUDENT IN AN ORGANIZED HEALTH CARE EDUCATION/TRAINING PROGRAM

## 2022-07-05 RX ORDER — CIPROFLOXACIN 500 MG/1
500 TABLET ORAL 2 TIMES DAILY
Qty: 14 TABLET | Refills: 0 | Status: SHIPPED | OUTPATIENT
Start: 2022-07-05 | End: 2022-07-12

## 2022-09-27 ENCOUNTER — OFFICE VISIT (OUTPATIENT)
Dept: FAMILY MEDICINE | Facility: CLINIC | Age: 67
End: 2022-09-27
Payer: COMMERCIAL

## 2022-09-27 ENCOUNTER — TELEPHONE (OUTPATIENT)
Dept: FAMILY MEDICINE | Facility: CLINIC | Age: 67
End: 2022-09-27

## 2022-09-27 VITALS
HEIGHT: 64 IN | SYSTOLIC BLOOD PRESSURE: 134 MMHG | WEIGHT: 185 LBS | HEART RATE: 81 BPM | BODY MASS INDEX: 31.58 KG/M2 | TEMPERATURE: 99 F | OXYGEN SATURATION: 97 % | DIASTOLIC BLOOD PRESSURE: 78 MMHG

## 2022-09-27 DIAGNOSIS — G47.00 INSOMNIA, UNSPECIFIED TYPE: ICD-10-CM

## 2022-09-27 DIAGNOSIS — M79.605 PAIN OF LEFT LOWER EXTREMITY: ICD-10-CM

## 2022-09-27 PROCEDURE — 99213 OFFICE O/P EST LOW 20 MIN: CPT | Mod: S$GLB,,, | Performed by: INTERNAL MEDICINE

## 2022-09-27 PROCEDURE — 99213 PR OFFICE/OUTPT VISIT, EST, LEVL III, 20-29 MIN: ICD-10-PCS | Mod: S$GLB,,, | Performed by: INTERNAL MEDICINE

## 2022-09-27 RX ORDER — ESZOPICLONE 2 MG/1
2 TABLET, FILM COATED ORAL NIGHTLY PRN
Status: CANCELLED | OUTPATIENT
Start: 2022-09-27

## 2022-09-27 RX ORDER — TEMAZEPAM 30 MG/1
30 CAPSULE ORAL NIGHTLY PRN
Qty: 30 CAPSULE | Refills: 3 | Status: SHIPPED | OUTPATIENT
Start: 2022-09-27 | End: 2022-10-27

## 2022-09-27 RX ORDER — GABAPENTIN 300 MG/1
300 CAPSULE ORAL NIGHTLY
Qty: 90 CAPSULE | Refills: 1 | Status: SHIPPED | OUTPATIENT
Start: 2022-09-27 | End: 2022-12-22 | Stop reason: SDUPTHER

## 2022-09-27 RX ORDER — GABAPENTIN 300 MG/1
300 CAPSULE ORAL NIGHTLY
Qty: 90 CAPSULE | Refills: 1 | Status: SHIPPED | OUTPATIENT
Start: 2022-09-27 | End: 2022-09-27 | Stop reason: SDUPTHER

## 2022-09-27 RX ORDER — ESZOPICLONE 2 MG/1
2 TABLET, FILM COATED ORAL NIGHTLY PRN
COMMUNITY
Start: 2022-08-07 | End: 2022-09-27 | Stop reason: ALTCHOICE

## 2022-09-27 RX ORDER — ZOLPIDEM TARTRATE 10 MG/1
10 TABLET ORAL NIGHTLY PRN
Qty: 30 TABLET | Refills: 3 | Status: CANCELLED | OUTPATIENT
Start: 2022-09-27 | End: 2023-03-28

## 2022-09-27 NOTE — PROGRESS NOTES
Subjective:       Patient ID: Kayla Piper is a 67 y.o. female.    Chief Complaint: Hypertension (3 months follow up med refill)    Here for routine follow up; last visit note, most recent available labs, and health maintenance topics reviewed.   Only getting 4-5 hours of sleep at night.  Takes meds but tosses and turns for 2-3 hours before falling asleep.   Mood stable.  Leg pain improved with gabapentin.    Hypertension  This is a chronic problem. The problem is controlled (fine on home monitoring; 120s/80s). Associated symptoms include anxiety (especially when she comes to the office). Pertinent negatives include no chest pain, headaches, malaise/fatigue, neck pain, palpitations, peripheral edema or shortness of breath. Agents associated with hypertension include estrogens and thyroid hormones (has been using albuterol recently). Risk factors for coronary artery disease include dyslipidemia, obesity and post-menopausal state. Past treatments include diuretics, angiotensin blockers, calcium channel blockers and lifestyle changes. There are no compliance problems.  Hypertensive end-organ damage includes PVD. Identifiable causes of hypertension include a thyroid problem.   Hyperlipidemia  This is a chronic problem. The problem is controlled. Recent lipid tests were reviewed and are normal. Exacerbating diseases include obesity. Associated symptoms include myalgias. Pertinent negatives include no chest pain or shortness of breath. Current antihyperlipidemic treatment includes statins. The current treatment provides significant improvement of lipids. There are no compliance problems.    Review of Systems   Constitutional:  Negative for activity change, appetite change, chills, diaphoresis, fatigue, fever, malaise/fatigue and unexpected weight change.   HENT:  Negative for congestion, ear discharge, ear pain, hearing loss, mouth sores, nosebleeds, postnasal drip, rhinorrhea, sinus pressure, sinus pain,  sneezing, sore throat, tinnitus, trouble swallowing and voice change.    Eyes:  Negative for photophobia, pain, discharge, redness, itching and visual disturbance.   Respiratory:  Negative for apnea, cough, choking, chest tightness, shortness of breath and wheezing.    Cardiovascular:  Negative for chest pain, palpitations and leg swelling.   Gastrointestinal:  Negative for abdominal distention, abdominal pain, blood in stool, constipation, diarrhea, nausea and vomiting.   Endocrine: Negative for cold intolerance, heat intolerance, polydipsia and polyuria.   Genitourinary:  Negative for decreased urine volume, difficulty urinating, dyspareunia, dysuria, enuresis, frequency, genital sores, hematuria, menstrual problem, pelvic pain, urgency, vaginal bleeding, vaginal discharge and vaginal pain.   Musculoskeletal:  Positive for myalgias. Negative for back pain, gait problem, joint swelling, neck pain and neck stiffness. Arthralgias: right knee pain.  Skin:  Negative for rash and wound.   Allergic/Immunologic: Negative for environmental allergies, food allergies and immunocompromised state.   Neurological:  Negative for dizziness, tremors, seizures, syncope, facial asymmetry, speech difficulty, weakness, light-headedness, numbness and headaches.   Hematological:  Negative for adenopathy. Does not bruise/bleed easily.   Psychiatric/Behavioral:  Positive for sleep disturbance. Negative for confusion, decreased concentration, hallucinations, self-injury and suicidal ideas. The patient is not nervous/anxious.      Past Medical History:   Diagnosis Date    Carotid stenosis, right     DDD (degenerative disc disease), cervical     Depression     Hyperkalemia     Hypertension     Insomnia     Rhinitis     Synovial cyst of popliteal space       Past Surgical History:   Procedure Laterality Date    ANTERIOR CERVICAL DISCECTOMY W/ FUSION      APPENDECTOMY      CAROTID ENDARTERECTOMY Right 10/03/2018    COLONOSCOPY  2013    KNEE  SURGERY Right        Family History   Problem Relation Age of Onset    Hypertension Mother     Coronary artery disease Mother     Lung cancer Father     Hypertension Sister        Social History     Socioeconomic History    Marital status:    Occupational History    Occupation:    Tobacco Use    Smoking status: Former    Smokeless tobacco: Never   Substance and Sexual Activity    Alcohol use: Yes     Comment: occasional    Drug use: No    Sexual activity: Yes     Partners: Male     Birth control/protection: None     Comment:    Social History Narrative    Live with      Social Determinants of Health     Financial Resource Strain: Low Risk     Difficulty of Paying Living Expenses: Not hard at all   Food Insecurity: No Food Insecurity    Worried About Running Out of Food in the Last Year: Never true    Ran Out of Food in the Last Year: Never true   Transportation Needs: No Transportation Needs    Lack of Transportation (Medical): No    Lack of Transportation (Non-Medical): No   Physical Activity: Insufficiently Active    Days of Exercise per Week: 4 days    Minutes of Exercise per Session: 30 min   Stress: Stress Concern Present    Feeling of Stress : Very much   Social Connections: Unknown    Frequency of Communication with Friends and Family: More than three times a week    Frequency of Social Gatherings with Friends and Family: Patient refused    Active Member of Clubs or Organizations: Yes    Attends Club or Organization Meetings: Patient refused    Marital Status:    Housing Stability: Low Risk     Unable to Pay for Housing in the Last Year: No    Number of Places Lived in the Last Year: 1    Unstable Housing in the Last Year: No       Current Outpatient Medications   Medication Sig Dispense Refill    albuterol (PROVENTIL/VENTOLIN HFA) 90 mcg/actuation inhaler USE 2 INHALATIONS EVERY 4 TO 6 HOURS AS NEEDED FOR SHORTNESS OF BREATH 25.5 g 3    allopurinoL (ZYLOPRIM) 300 MG  "tablet TAKE 1 TABLET DAILY 90 tablet 1    amLODIPine (NORVASC) 10 MG tablet Take 1 tablet (10 mg total) by mouth once daily. 90 tablet 1    aspirin 325 MG tablet Take 325 mg by mouth once daily.      celecoxib (CELEBREX) 200 MG capsule TAKE 1 CAPSULE(200 MG) BY MOUTH EVERY DAY 90 capsule 1    cetirizine (ZYRTEC) 10 MG tablet Take 10 mg by mouth once daily.      EScitalopram oxalate (LEXAPRO) 20 MG tablet Take 1 tablet (20 mg total) by mouth once daily. 90 tablet 1    fluticasone propionate (FLONASE) 50 mcg/actuation nasal spray 1 spray by Each Nare route once daily.      multivitamin (THERAGRAN) per tablet Take 1 tablet by mouth once daily.      progesterone (PROMETRIUM) 200 MG capsule Take 1 capsule by mouth once daily.      rosuvastatin (CRESTOR) 10 MG tablet TAKE 1 TABLET DAILY 90 tablet 1    valsartan-hydrochlorothiazide (DIOVAN-HCT) 320-25 mg per tablet Take 1 tablet by mouth once daily. 90 tablet 1    gabapentin (NEURONTIN) 300 MG capsule Take 1 capsule (300 mg total) by mouth every evening. 90 capsule 1    temazepam (RESTORIL) 30 mg capsule Take 1 capsule (30 mg total) by mouth nightly as needed for Insomnia. 30 capsule 3     No current facility-administered medications for this visit.       Review of patient's allergies indicates:  No Known Allergies  Objective:    HPI     Hypertension     Additional comments: 3 months follow up med refill          Last edited by Devendra Aguirre MA on 9/27/2022  2:23 PM.      Blood pressure 134/78, pulse 81, temperature 99.3 °F (37.4 °C), temperature source Temporal, height 5' 4" (1.626 m), weight 83.9 kg (185 lb), SpO2 97 %. Body mass index is 31.76 kg/m².   Physical Exam      Lab Visit on 07/05/2022   Component Date Value Ref Range Status    Sodium 07/05/2022 140  136 - 145 mmol/L Final    Potassium 07/05/2022 4.9  3.5 - 5.1 mmol/L Final    Chloride 07/05/2022 102  95 - 110 mmol/L Final    CO2 07/05/2022 32 (H)  23 - 29 mmol/L Final    Glucose 07/05/2022 103  70 - 110 " mg/dL Final    BUN 07/05/2022 25 (H)  8 - 23 mg/dL Final    Creatinine 07/05/2022 1.0  0.5 - 1.4 mg/dL Final    Calcium 07/05/2022 11.1 (H)  8.7 - 10.5 mg/dL Final    Total Protein 07/05/2022 7.4  6.0 - 8.4 g/dL Final    Albumin 07/05/2022 4.4  3.5 - 5.2 g/dL Final    Total Bilirubin 07/05/2022 0.5  0.1 - 1.0 mg/dL Final    Comment: For infants and newborns, interpretation of results should be based  on gestational age, weight and in agreement with clinical  observations.    Premature Infant recommended reference ranges:  Up to 24 hours.............<8.0 mg/dL  Up to 48 hours............<12.0 mg/dL  3-5 days..................<15.0 mg/dL  6-29 days.................<15.0 mg/dL      Alkaline Phosphatase 07/05/2022 68  55 - 135 U/L Final    AST 07/05/2022 19  10 - 40 U/L Final    ALT 07/05/2022 22  10 - 44 U/L Final    Anion Gap 07/05/2022 6 (L)  8 - 16 mmol/L Final    eGFR if African American 07/05/2022 >60  >60 mL/min/1.73 m^2 Final    eGFR if non African American 07/05/2022 60  >60 mL/min/1.73 m^2 Final    Comment: Calculation used to obtain the estimated glomerular filtration  rate (eGFR) is the CKD-EPI equation.       Cholesterol 07/05/2022 136  120 - 199 mg/dL Final    Comment: The National Cholesterol Education Program (NCEP) has set the  following guidelines (reference ranges) for Cholesterol:  Optimal.....................<200 mg/dL  Borderline High.............200-239 mg/dL  High........................> or = 240 mg/dL      Triglycerides 07/05/2022 69  30 - 150 mg/dL Final    Comment: The National Cholesterol Education Program (NCEP) has set the  following guidelines (reference values) for triglycerides:  Normal......................<150 mg/dL  Borderline High.............150-199 mg/dL  High........................200-499 mg/dL      HDL 07/05/2022 47  40 - 75 mg/dL Final    Comment: The National Cholesterol Education Program (NCEP) has set the  following guidelines (reference values) for HDL  Cholesterol:  Low...............<40 mg/dL  Optimal...........>60 mg/dL      LDL Cholesterol 07/05/2022 75.2  63.0 - 159.0 mg/dL Final    Comment: The National Cholesterol Education Program (NCEP) has set the  following guidelines (reference values) for LDL Cholesterol:  Optimal.......................<130 mg/dL  Borderline High...............130-159 mg/dL  High..........................160-189 mg/dL  Very High.....................>190 mg/dL      HDL/Cholesterol Ratio 07/05/2022 34.6  20.0 - 50.0 % Final    Total Cholesterol/HDL Ratio 07/05/2022 2.9  2.0 - 5.0 Final    Non-HDL Cholesterol 07/05/2022 89  mg/dL Final    Comment: Risk category and Non-HDL cholesterol goals:  Coronary heart disease (CHD)or equivalent (10-year risk of CHD >20%):  Non-HDL cholesterol goal     <130 mg/dL  Two or more CHD risk factors and 10-year risk of CHD <= 20%:  Non-HDL cholesterol goal     <160 mg/dL  0 to 1 CHD risk factor:  Non-HDL cholesterol goal     <190 mg/dL      Hemoglobin A1C 07/05/2022 5.7 (H)  4.0 - 5.6 % Final    Comment: ADA Screening Guidelines:  5.7-6.4%  Consistent with prediabetes  >or=6.5%  Consistent with diabetes    High levels of fetal hemoglobin interfere with the HbA1C  assay. Heterozygous hemoglobin variants (HbS, HgC, etc)do  not significantly interfere with this assay.   However, presence of multiple variants may affect accuracy.      Estimated Avg Glucose 07/05/2022 117  68 - 131 mg/dL Final    Uric Acid 07/05/2022 7.3 (H)  2.4 - 5.7 mg/dL Final    Specimen UA 07/05/2022 Urine, Clean Catch   Final    Color, UA 07/05/2022 Yellow  Yellow, Straw, Shwetha Final    Appearance, UA 07/05/2022 Hazy (A)  Clear Final    pH, UA 07/05/2022 6.0  5.0 - 8.0 Final    Specific Gravity, UA 07/05/2022 >=1.030 (A)  1.005 - 1.030 Final    Protein, UA 07/05/2022 Negative  Negative Final    Comment: Recommend a 24 hour urine protein or a urine   protein/creatinine ratio if globulin induced proteinuria is  clinically suspected.       Glucose, UA 07/05/2022 Negative  Negative Final    Ketones, UA 07/05/2022 Negative  Negative Final    Bilirubin (UA) 07/05/2022 Negative  Negative Final    Occult Blood UA 07/05/2022 1+ (A)  Negative Final    Nitrite, UA 07/05/2022 Positive (A)  Negative Final    Urobilinogen, UA 07/05/2022 Negative  <2.0 EU/dL Final    Leukocytes, UA 07/05/2022 1+ (A)  Negative Final    RBC, UA 07/05/2022 4  0 - 4 /hpf Final    WBC, UA 07/05/2022 64 (H)  0 - 5 /hpf Final    Bacteria 07/05/2022 Many (A)  None-Occ /hpf Final    Squam Epithel, UA 07/05/2022 3  /hpf Final    Microscopic Comment 07/05/2022 SEE COMMENT   Final    Comment: Other formed elements not mentioned in the report are not   present in the microscopic examination.      ]  Assessment:       1. Pain of left lower extremity    2. Insomnia, unspecified type        Plan:       Kayla was seen today for hypertension.    Diagnoses and all orders for this visit:    Pain of left lower extremity  Comments:  Sciatica vs RLS.  Sounds more like sciatica so will give a trial of gabapentin  Orders:  -     gabapentin (NEURONTIN) 300 MG capsule; Take 1 capsule (300 mg total) by mouth every evening.    Insomnia, unspecified type  -     temazepam (RESTORIL) 30 mg capsule; Take 1 capsule (30 mg total) by mouth nightly as needed for Insomnia.    Other orders  The following orders have not been finalized:  -     Cancel: zolpidem (AMBIEN) 10 mg Tab  -     Cancel: eszopiclone (LUNESTA) 2 MG Tab

## 2022-09-27 NOTE — TELEPHONE ENCOUNTER
----- Message from Maegan Reese LPN sent at 9/27/2022  2:53 PM CDT -----  Regarding: Gabapentin  Pt needs gabapentin sent to [facility] instead of [facility].

## 2022-12-22 ENCOUNTER — TELEPHONE (OUTPATIENT)
Dept: FAMILY MEDICINE | Facility: CLINIC | Age: 67
End: 2022-12-22

## 2022-12-22 ENCOUNTER — OFFICE VISIT (OUTPATIENT)
Dept: FAMILY MEDICINE | Facility: CLINIC | Age: 67
End: 2022-12-22
Payer: COMMERCIAL

## 2022-12-22 VITALS
TEMPERATURE: 98 F | WEIGHT: 183 LBS | HEIGHT: 64 IN | DIASTOLIC BLOOD PRESSURE: 70 MMHG | OXYGEN SATURATION: 98 % | SYSTOLIC BLOOD PRESSURE: 130 MMHG | HEART RATE: 78 BPM | BODY MASS INDEX: 31.24 KG/M2

## 2022-12-22 DIAGNOSIS — G47.00 INSOMNIA, UNSPECIFIED TYPE: ICD-10-CM

## 2022-12-22 DIAGNOSIS — Z78.0 MENOPAUSE: ICD-10-CM

## 2022-12-22 DIAGNOSIS — I10 WHITE COAT SYNDROME WITH DIAGNOSIS OF HYPERTENSION: Primary | ICD-10-CM

## 2022-12-22 DIAGNOSIS — M79.605 PAIN OF LEFT LOWER EXTREMITY: ICD-10-CM

## 2022-12-22 DIAGNOSIS — I10 WHITE COAT SYNDROME WITH DIAGNOSIS OF HYPERTENSION: ICD-10-CM

## 2022-12-22 DIAGNOSIS — Z12.31 VISIT FOR SCREENING MAMMOGRAM: ICD-10-CM

## 2022-12-22 PROCEDURE — 99214 OFFICE O/P EST MOD 30 MIN: CPT | Mod: S$GLB,,, | Performed by: INTERNAL MEDICINE

## 2022-12-22 PROCEDURE — 99214 PR OFFICE/OUTPT VISIT, EST, LEVL IV, 30-39 MIN: ICD-10-PCS | Mod: S$GLB,,, | Performed by: INTERNAL MEDICINE

## 2022-12-22 RX ORDER — AMLODIPINE BESYLATE 10 MG/1
10 TABLET ORAL DAILY
Qty: 90 TABLET | Refills: 1 | Status: SHIPPED | OUTPATIENT
Start: 2022-12-22 | End: 2023-03-28 | Stop reason: SDUPTHER

## 2022-12-22 RX ORDER — TEMAZEPAM 30 MG/1
30 CAPSULE ORAL NIGHTLY PRN
Qty: 30 CAPSULE | Refills: 3 | Status: SHIPPED | OUTPATIENT
Start: 2022-12-22 | End: 2023-03-28 | Stop reason: SDUPTHER

## 2022-12-22 RX ORDER — ENOXAPARIN SODIUM 100 MG/ML
1 INJECTION SUBCUTANEOUS EVERY MORNING
COMMUNITY
Start: 2022-12-02 | End: 2023-03-28 | Stop reason: ALTCHOICE

## 2022-12-22 RX ORDER — VALSARTAN AND HYDROCHLOROTHIAZIDE 320; 25 MG/1; MG/1
1 TABLET, FILM COATED ORAL DAILY
Qty: 90 TABLET | Refills: 1 | Status: SHIPPED | OUTPATIENT
Start: 2022-12-22 | End: 2023-03-28 | Stop reason: SDUPTHER

## 2022-12-22 RX ORDER — GABAPENTIN 300 MG/1
300 CAPSULE ORAL NIGHTLY
Qty: 90 CAPSULE | Refills: 1 | Status: SHIPPED | OUTPATIENT
Start: 2022-12-22 | End: 2023-03-28 | Stop reason: SDUPTHER

## 2022-12-22 RX ORDER — CYCLOBENZAPRINE HCL 10 MG
10 TABLET ORAL 3 TIMES DAILY PRN
COMMUNITY
Start: 2022-12-02 | End: 2023-03-28

## 2022-12-22 RX ORDER — GABAPENTIN 300 MG/1
300 CAPSULE ORAL NIGHTLY
Qty: 90 CAPSULE | Refills: 1 | Status: SHIPPED | OUTPATIENT
Start: 2022-12-22 | End: 2022-12-22 | Stop reason: SDUPTHER

## 2022-12-22 RX ORDER — CEFUROXIME AXETIL 250 MG/1
250 TABLET ORAL 2 TIMES DAILY
COMMUNITY
Start: 2022-12-02 | End: 2023-02-10

## 2022-12-22 RX ORDER — VALSARTAN AND HYDROCHLOROTHIAZIDE 320; 25 MG/1; MG/1
1 TABLET, FILM COATED ORAL DAILY
Qty: 90 TABLET | Refills: 1 | Status: SHIPPED | OUTPATIENT
Start: 2022-12-22 | End: 2022-12-22 | Stop reason: SDUPTHER

## 2022-12-22 RX ORDER — AMLODIPINE BESYLATE 10 MG/1
10 TABLET ORAL DAILY
Qty: 90 TABLET | Refills: 1 | Status: SHIPPED | OUTPATIENT
Start: 2022-12-22 | End: 2022-12-22 | Stop reason: SDUPTHER

## 2022-12-22 RX ORDER — TEMAZEPAM 30 MG/1
30 CAPSULE ORAL NIGHTLY PRN
COMMUNITY
Start: 2022-12-13 | End: 2022-12-22 | Stop reason: SDUPTHER

## 2022-12-22 RX ORDER — HYDROCODONE BITARTRATE AND ACETAMINOPHEN 5; 325 MG/1; MG/1
1-2 TABLET ORAL EVERY 6 HOURS PRN
COMMUNITY
Start: 2022-12-02 | End: 2023-03-28

## 2022-12-22 RX ORDER — PROGESTERONE 200 MG/1
200 CAPSULE ORAL DAILY
Status: CANCELLED | OUTPATIENT
Start: 2022-12-22

## 2022-12-22 NOTE — TELEPHONE ENCOUNTER
----- Message from Devendra Aguirre MA sent at 12/22/2022  1:46 PM CST -----  Please reroute to [facility].

## 2022-12-22 NOTE — PROGRESS NOTES
Subjective:       Patient ID: Kayla Piper is a 67 y.o. female.    Chief Complaint: Hypertension (3 months follow up)    Here for routine follow up; last visit note, most recent available labs, and health maintenance topics reviewed.   Sleeping better with restoril although still has issues at times.  She has a gout flare every 3-4 months or so; generally responds well to NSAIDs.   She recently underwent surgery for incarcerated inguinal hernia.  Mood stable.    Hypertension  This is a chronic problem. The problem is controlled (fine on home monitoring and at recent GYN visit; 120s/80s). Associated symptoms include anxiety (especially when she comes to the office). Pertinent negatives include no chest pain, headaches, malaise/fatigue, neck pain, palpitations, peripheral edema or shortness of breath. Agents associated with hypertension include estrogens and thyroid hormones (has been using albuterol recently). Risk factors for coronary artery disease include dyslipidemia, obesity and post-menopausal state. Past treatments include diuretics, angiotensin blockers, calcium channel blockers and lifestyle changes. There are no compliance problems.  Hypertensive end-organ damage includes PVD. Identifiable causes of hypertension include a thyroid problem.   Hyperlipidemia  This is a chronic problem. The problem is controlled. Recent lipid tests were reviewed and are normal. Exacerbating diseases include obesity. Pertinent negatives include no chest pain, myalgias or shortness of breath. Current antihyperlipidemic treatment includes statins. The current treatment provides significant improvement of lipids. There are no compliance problems.    Review of Systems   Constitutional:  Negative for activity change, appetite change, chills, diaphoresis, fatigue, fever, malaise/fatigue and unexpected weight change.   HENT:  Negative for congestion, ear discharge, ear pain, hearing loss, mouth sores, nosebleeds, postnasal  drip, rhinorrhea, sinus pressure, sinus pain, sneezing, sore throat, tinnitus, trouble swallowing and voice change.    Eyes:  Negative for photophobia, pain, discharge, redness, itching and visual disturbance.   Respiratory:  Negative for apnea, cough, choking, chest tightness, shortness of breath and wheezing.    Cardiovascular:  Negative for chest pain, palpitations and leg swelling.   Gastrointestinal:  Positive for abdominal pain. Negative for abdominal distention, blood in stool, constipation, diarrhea, nausea and vomiting.   Endocrine: Negative for cold intolerance, heat intolerance, polydipsia and polyuria.   Genitourinary:  Negative for decreased urine volume, difficulty urinating, dyspareunia, dysuria, enuresis, frequency, genital sores, hematuria, menstrual problem, pelvic pain, urgency, vaginal bleeding, vaginal discharge and vaginal pain.   Musculoskeletal:  Negative for arthralgias, back pain, gait problem, joint swelling, myalgias, neck pain and neck stiffness.   Skin:  Negative for rash and wound.   Allergic/Immunologic: Negative for environmental allergies, food allergies and immunocompromised state.   Neurological:  Negative for dizziness, tremors, seizures, syncope, facial asymmetry, speech difficulty, weakness, light-headedness, numbness and headaches.   Hematological:  Negative for adenopathy. Does not bruise/bleed easily.   Psychiatric/Behavioral:  Positive for sleep disturbance. Negative for confusion, decreased concentration, hallucinations, self-injury and suicidal ideas. The patient is not nervous/anxious.      Past Medical History:   Diagnosis Date    Carotid stenosis, right     DDD (degenerative disc disease), cervical     Depression     Hyperkalemia     Hypertension     Insomnia     Rhinitis     Synovial cyst of popliteal space       Past Surgical History:   Procedure Laterality Date    ANTERIOR CERVICAL DISCECTOMY W/ FUSION      APPENDECTOMY      CAROTID ENDARTERECTOMY Right 10/03/2018     COLONOSCOPY  2013    KNEE SURGERY Right     UMBILICAL HERNIA REPAIR  12/14/2022       Family History   Problem Relation Age of Onset    Hypertension Mother     Coronary artery disease Mother     Lung cancer Father     Hypertension Sister        Social History     Socioeconomic History    Marital status:    Occupational History    Occupation:    Tobacco Use    Smoking status: Former    Smokeless tobacco: Never   Substance and Sexual Activity    Alcohol use: Yes     Comment: occasional    Drug use: No    Sexual activity: Yes     Partners: Male     Birth control/protection: None     Comment:    Social History Narrative    Live with      Social Determinants of Health     Financial Resource Strain: Low Risk     Difficulty of Paying Living Expenses: Not hard at all   Food Insecurity: No Food Insecurity    Worried About Running Out of Food in the Last Year: Never true    Ran Out of Food in the Last Year: Never true   Transportation Needs: No Transportation Needs    Lack of Transportation (Medical): No    Lack of Transportation (Non-Medical): No   Physical Activity: Insufficiently Active    Days of Exercise per Week: 4 days    Minutes of Exercise per Session: 30 min   Stress: Stress Concern Present    Feeling of Stress : Very much   Social Connections: Unknown    Frequency of Communication with Friends and Family: More than three times a week    Frequency of Social Gatherings with Friends and Family: Patient refused    Active Member of Clubs or Organizations: Yes    Attends Club or Organization Meetings: Patient refused    Marital Status:    Housing Stability: Low Risk     Unable to Pay for Housing in the Last Year: No    Number of Places Lived in the Last Year: 1    Unstable Housing in the Last Year: No       Current Outpatient Medications   Medication Sig Dispense Refill    albuterol (PROVENTIL/VENTOLIN HFA) 90 mcg/actuation inhaler USE 2 INHALATIONS EVERY 4 TO 6 HOURS AS  "NEEDED FOR SHORTNESS OF BREATH 25.5 g 3    allopurinoL (ZYLOPRIM) 300 MG tablet TAKE 1 TABLET DAILY 90 tablet 1    aspirin 325 MG tablet Take 325 mg by mouth once daily.      cefUROXime (CEFTIN) 250 MG tablet Take 250 mg by mouth 2 (two) times daily.      cetirizine (ZYRTEC) 10 MG tablet Take 10 mg by mouth once daily.      cyclobenzaprine (FLEXERIL) 10 MG tablet Take 10 mg by mouth 3 (three) times daily as needed.      enoxaparin (LOVENOX) 40 mg/0.4 mL Syrg Inject 1 mL into the skin every morning.      EScitalopram oxalate (LEXAPRO) 20 MG tablet TAKE 1 TABLET DAILY 90 tablet 1    fluticasone propionate (FLONASE) 50 mcg/actuation nasal spray 1 spray by Each Nare route once daily.      HYDROcodone-acetaminophen (NORCO) 5-325 mg per tablet Take 1-2 tablets by mouth every 6 (six) hours as needed.      multivitamin (THERAGRAN) per tablet Take 1 tablet by mouth once daily.      progesterone (PROMETRIUM) 200 MG capsule Take 1 capsule by mouth once daily.      rosuvastatin (CRESTOR) 10 MG tablet TAKE 1 TABLET DAILY 90 tablet 1    amLODIPine (NORVASC) 10 MG tablet Take 1 tablet (10 mg total) by mouth once daily. 90 tablet 1    gabapentin (NEURONTIN) 300 MG capsule Take 1 capsule (300 mg total) by mouth every evening. 90 capsule 1    temazepam (RESTORIL) 30 mg capsule Take 1 capsule (30 mg total) by mouth nightly as needed for Insomnia. 30 capsule 3    valsartan-hydrochlorothiazide (DIOVAN-HCT) 320-25 mg per tablet Take 1 tablet by mouth once daily. 90 tablet 1     No current facility-administered medications for this visit.       Review of patient's allergies indicates:  No Known Allergies  Objective:    HPI     Hypertension     Additional comments: 3 months follow up          Last edited by Devendra Aguirre MA on 12/22/2022 11:19 AM.      Blood pressure 130/70, pulse 78, temperature 97.9 °F (36.6 °C), temperature source Temporal, height 5' 4" (1.626 m), weight 83 kg (183 lb), SpO2 98 %. Body mass index is 31.41 kg/m². "   Physical Exam  Vitals and nursing note reviewed.   Constitutional:       General: She is not in acute distress.     Appearance: She is well-developed. She is obese. She is not ill-appearing, toxic-appearing or diaphoretic.   HENT:      Head: Normocephalic and atraumatic.   Eyes:      General: No scleral icterus.        Right eye: No discharge.         Left eye: No discharge.      Conjunctiva/sclera: Conjunctivae normal.   Neck:      Vascular: Carotid bruit (right) present.   Cardiovascular:      Rate and Rhythm: Normal rate and regular rhythm.      Heart sounds: Normal heart sounds. No murmur heard.  Pulmonary:      Effort: Pulmonary effort is normal. No respiratory distress.      Breath sounds: Normal breath sounds. No decreased breath sounds, wheezing, rhonchi or rales.   Abdominal:      General: There is no distension.      Palpations: Abdomen is soft.      Tenderness: There is abdominal tenderness. There is no guarding or rebound.      Comments: girdle   Musculoskeletal:      Right lower leg: No edema.      Left lower leg: No edema.   Skin:     General: Skin is warm and dry.   Neurological:      Mental Status: She is alert.      Motor: No tremor.   Psychiatric:         Mood and Affect: Mood normal.         Speech: Speech normal.         Behavior: Behavior normal.           Assessment:       1. White coat syndrome with diagnosis of hypertension    2. Pain of left lower extremity    3. Visit for screening mammogram    4. Menopause    5. Insomnia, unspecified type        Plan:       Kayla was seen today for hypertension.    Diagnoses and all orders for this visit:    White coat syndrome with diagnosis of hypertension  Comments:  Wel controlled.  Continue to monitor at home.    Orders:  -     amLODIPine (NORVASC) 10 MG tablet; Take 1 tablet (10 mg total) by mouth once daily.  -     valsartan-hydrochlorothiazide (DIOVAN-HCT) 320-25 mg per tablet; Take 1 tablet by mouth once daily.    Pain of left lower  extremity  Comments:  Did resolve with gabapentin so likely was sciatica  Orders:  -     gabapentin (NEURONTIN) 300 MG capsule; Take 1 capsule (300 mg total) by mouth every evening.    Visit for screening mammogram  -     Mammo Digital Screening Bilat w/ [facility]; Future  -     Mammo Digital Screening Bilat w/ [facility]    Menopause  -     DXA Bone Density Spine And Hip; Future  -     DXA Bone Density Spine And Hip    Insomnia, unspecified type  -     temazepam (RESTORIL) 30 mg capsule; Take 1 capsule (30 mg total) by mouth nightly as needed for Insomnia.    Other orders  The following orders have not been finalized:  -     Cancel: progesterone (PROMETRIUM) 200 MG capsule

## 2022-12-30 LAB — BCS RECOMMENDATION EXT: NORMAL

## 2023-02-10 ENCOUNTER — OFFICE VISIT (OUTPATIENT)
Dept: URGENT CARE | Facility: CLINIC | Age: 68
End: 2023-02-10
Payer: COMMERCIAL

## 2023-02-10 VITALS
SYSTOLIC BLOOD PRESSURE: 133 MMHG | WEIGHT: 174 LBS | TEMPERATURE: 98 F | DIASTOLIC BLOOD PRESSURE: 75 MMHG | OXYGEN SATURATION: 100 % | RESPIRATION RATE: 16 BRPM | BODY MASS INDEX: 29.87 KG/M2 | HEART RATE: 94 BPM

## 2023-02-10 DIAGNOSIS — J02.0 STREP PHARYNGITIS: ICD-10-CM

## 2023-02-10 DIAGNOSIS — J02.9 SORE THROAT: Primary | ICD-10-CM

## 2023-02-10 LAB
CTP QC/QA: YES
S PYO RRNA THROAT QL PROBE: POSITIVE

## 2023-02-10 PROCEDURE — 99204 OFFICE O/P NEW MOD 45 MIN: CPT | Mod: 25,S$GLB,, | Performed by: STUDENT IN AN ORGANIZED HEALTH CARE EDUCATION/TRAINING PROGRAM

## 2023-02-10 PROCEDURE — 99204 PR OFFICE/OUTPT VISIT, NEW, LEVL IV, 45-59 MIN: ICD-10-PCS | Mod: 25,S$GLB,, | Performed by: STUDENT IN AN ORGANIZED HEALTH CARE EDUCATION/TRAINING PROGRAM

## 2023-02-10 PROCEDURE — 87880 POCT RAPID STREP A: ICD-10-PCS | Mod: QW,,, | Performed by: STUDENT IN AN ORGANIZED HEALTH CARE EDUCATION/TRAINING PROGRAM

## 2023-02-10 PROCEDURE — 96372 THER/PROPH/DIAG INJ SC/IM: CPT | Mod: S$GLB,,, | Performed by: STUDENT IN AN ORGANIZED HEALTH CARE EDUCATION/TRAINING PROGRAM

## 2023-02-10 PROCEDURE — 96372 PR INJECTION,THERAP/PROPH/DIAG2ST, IM OR SUBCUT: ICD-10-PCS | Mod: S$GLB,,, | Performed by: STUDENT IN AN ORGANIZED HEALTH CARE EDUCATION/TRAINING PROGRAM

## 2023-02-10 PROCEDURE — 87880 STREP A ASSAY W/OPTIC: CPT | Mod: QW,,, | Performed by: STUDENT IN AN ORGANIZED HEALTH CARE EDUCATION/TRAINING PROGRAM

## 2023-02-10 RX ORDER — BENZOCAINE/MENTHOL 15 MG-10MG
1 LOZENGE MUCOUS MEMBRANE
Qty: 30 LOZENGE | Refills: 0 | Status: SHIPPED | OUTPATIENT
Start: 2023-02-10 | End: 2023-03-28

## 2023-02-10 RX ORDER — CEFTRIAXONE 1 G/1
1 INJECTION, POWDER, FOR SOLUTION INTRAMUSCULAR; INTRAVENOUS
Status: COMPLETED | OUTPATIENT
Start: 2023-02-10 | End: 2023-02-10

## 2023-02-10 RX ORDER — METHYLPREDNISOLONE 4 MG/1
TABLET ORAL
Qty: 21 EACH | Refills: 0 | Status: SHIPPED | OUTPATIENT
Start: 2023-02-10 | End: 2023-03-03

## 2023-02-10 RX ORDER — AMOXICILLIN AND CLAVULANATE POTASSIUM 875; 125 MG/1; MG/1
1 TABLET, FILM COATED ORAL 2 TIMES DAILY
Qty: 20 TABLET | Refills: 0 | Status: SHIPPED | OUTPATIENT
Start: 2023-02-10 | End: 2023-02-20

## 2023-02-10 RX ADMIN — CEFTRIAXONE 1 G: 1 INJECTION, POWDER, FOR SOLUTION INTRAMUSCULAR; INTRAVENOUS at 10:02

## 2023-02-10 NOTE — PROGRESS NOTES
Subjective:       Patient ID: Kayla Piper is a 67 y.o. female.    Vitals:  weight is 78.9 kg (174 lb). Her temperature is 98.1 °F (36.7 °C). Her blood pressure is 133/75 and her pulse is 94. Her respiration is 16 and oxygen saturation is 100%.     Chief Complaint: Sore Throat    Patient is a 67-year-old female with a past medical history of DDD, depression, hypertension, insomnia, allergic rhinitis, and GERD who presents to clinic for evaluation of sore throat.  Patient reports she is vaccinated.  Patient denies any recent or known sick exposures.  Patient reports symptoms x3 days now.  Patient reports only over-the-counter medication she has taken is that of ibuprofen.    Sore Throat   This is a new problem. The current episode started in the past 7 days (3 days). The problem has been unchanged. There has been no fever. The fever has been present for 1 to 2 days. The pain is at a severity of 5/10. Associated symptoms include trouble swallowing (Painful swallowing). Pertinent negatives include no abdominal pain, congestion, coughing, diarrhea, drooling, ear pain, headaches, shortness of breath or vomiting.     Constitution: Positive for fatigue and fever (Temperature max 103° F).   HENT:  Positive for sore throat and trouble swallowing (Painful swallowing). Negative for ear pain, drooling, congestion and voice change.    Neck: Positive for painful lymph nodes.   Cardiovascular: Negative.  Negative for chest pain and palpitations.   Eyes: Negative.    Respiratory: Negative.  Negative for chest tightness, cough and shortness of breath.    Gastrointestinal: Negative.  Negative for abdominal pain, nausea, vomiting and diarrhea.   Endocrine: negative.   Genitourinary: Negative.  Negative for dysuria, frequency and urgency.   Musculoskeletal: Negative.  Negative for muscle ache.   Skin: Negative.  Negative for color change, pale, rash and erythema.   Allergic/Immunologic: Negative.    Neurological: Negative.   Negative for dizziness, light-headedness, passing out, headaches, disorientation and altered mental status.   Hematologic/Lymphatic: Positive for swollen lymph nodes.   Psychiatric/Behavioral: Negative.  Negative for altered mental status, disorientation and confusion.      Objective:      Physical Exam   Constitutional: She is oriented to person, place, and time. She appears well-developed. She is cooperative.  Non-toxic appearance. She does not appear ill. No distress.   HENT:   Head: Normocephalic and atraumatic.   Ears:   Right Ear: Hearing, tympanic membrane, external ear and ear canal normal.   Left Ear: Hearing, tympanic membrane, external ear and ear canal normal.   Nose: Nose normal. No mucosal edema, rhinorrhea, nasal deformity or congestion. No epistaxis. Right sinus exhibits no maxillary sinus tenderness and no frontal sinus tenderness. Left sinus exhibits no maxillary sinus tenderness and no frontal sinus tenderness.   Mouth/Throat: Uvula is midline and mucous membranes are normal. Mucous membranes are moist. No trismus in the jaw. Normal dentition. No uvula swelling. Posterior oropharyngeal erythema present. Tonsils are 3+ on the right. Tonsils are 3+ on the left. Tonsillar exudate. Oropharynx is clear.   Eyes: Conjunctivae and lids are normal. Pupils are equal, round, and reactive to light. Right eye exhibits no discharge. Left eye exhibits no discharge. No scleral icterus.   Neck: Trachea normal and phonation normal. Neck supple. No neck rigidity present.   Cardiovascular: Normal rate, regular rhythm, normal heart sounds and normal pulses.   Pulmonary/Chest: Effort normal and breath sounds normal. No respiratory distress. She has no wheezes. She has no rhonchi. She has no rales.   Abdominal: Normal appearance and bowel sounds are normal. She exhibits no distension. Soft. There is no abdominal tenderness.   Musculoskeletal: Normal range of motion.         General: Normal range of motion.    Lymphadenopathy:     She has cervical adenopathy.   Neurological: She is alert and oriented to person, place, and time. She exhibits normal muscle tone.   Skin: Skin is warm, dry, intact, not diaphoretic, not pale and no rash. Capillary refill takes less than 2 seconds. No erythema   Psychiatric: Her speech is normal and behavior is normal. Judgment and thought content normal.   Nursing note and vitals reviewed.      Assessment:       1. Sore throat    2. Strep pharyngitis          Plan:         Sore throat  -     POCT rapid strep A    Strep pharyngitis    Other orders  -     cefTRIAXone injection 1 g  -     amoxicillin-clavulanate 875-125mg (AUGMENTIN) 875-125 mg per tablet; Take 1 tablet by mouth 2 (two) times daily. for 10 days  Dispense: 20 tablet; Refill: 0  -     methylPREDNISolone (MEDROL DOSEPACK) 4 mg tablet; use as directed  Dispense: 21 each; Refill: 0  -     benzocaine-menthoL (CHLORASEPTIC MAX) 15-10 mg Lozg; 1 lozenge by Mucous Membrane route every 2 (two) hours as needed (Sore throat).  Dispense: 30 lozenge; Refill: 0                 Labs:  Rapid strep positive.    Rocephin 1 g IM in clinic.  Patient tolerated well.  No complications noted.  Take medications as prescribed.  Tylenol/Motrin per package instructions for any pain or fever.  Recommend warm salt water gargles every 2-3 hours while awake.  Recommend replacing toothbrush and washing linens in hot water 48 hours after starting antibiotics.  Follow-up with PCP in 1-2 days.  Follow-up ENT as needed.  Return to clinic as needed.  To ED for any new or acutely worsening symptoms.  Patient in agreement with plan of care.     DISCLAIMER: Please note that my documentation in this Electronic Healthcare Record was produced using speech recognition software and therefore may contain errors related to that software system.These could include grammar, punctuation and spelling errors or the inclusion/exclusion of phrases that were not intended. Brayden  syntax, mangled pronouns, and other bizarre constructions may be attributed to that software system.

## 2023-03-28 ENCOUNTER — OFFICE VISIT (OUTPATIENT)
Dept: FAMILY MEDICINE | Facility: CLINIC | Age: 68
End: 2023-03-28
Payer: COMMERCIAL

## 2023-03-28 ENCOUNTER — TELEPHONE (OUTPATIENT)
Dept: FAMILY MEDICINE | Facility: CLINIC | Age: 68
End: 2023-03-28

## 2023-03-28 ENCOUNTER — PATIENT OUTREACH (OUTPATIENT)
Dept: ADMINISTRATIVE | Facility: HOSPITAL | Age: 68
End: 2023-03-28
Payer: COMMERCIAL

## 2023-03-28 VITALS
HEART RATE: 67 BPM | WEIGHT: 181.69 LBS | SYSTOLIC BLOOD PRESSURE: 118 MMHG | HEIGHT: 65 IN | TEMPERATURE: 98 F | BODY MASS INDEX: 30.27 KG/M2 | OXYGEN SATURATION: 99 % | DIASTOLIC BLOOD PRESSURE: 72 MMHG

## 2023-03-28 DIAGNOSIS — M79.605 PAIN OF LEFT LOWER EXTREMITY: ICD-10-CM

## 2023-03-28 DIAGNOSIS — I65.23 CAROTID STENOSIS, ASYMPTOMATIC, BILATERAL: ICD-10-CM

## 2023-03-28 DIAGNOSIS — I10 WHITE COAT SYNDROME WITH DIAGNOSIS OF HYPERTENSION: ICD-10-CM

## 2023-03-28 DIAGNOSIS — Z79.899 LONG-TERM CURRENT USE OF BENZODIAZEPINE: Primary | ICD-10-CM

## 2023-03-28 DIAGNOSIS — G47.00 INSOMNIA, UNSPECIFIED TYPE: ICD-10-CM

## 2023-03-28 DIAGNOSIS — M10.9 GOUT INVOLVING TOE OF RIGHT FOOT, UNSPECIFIED CAUSE, UNSPECIFIED CHRONICITY: ICD-10-CM

## 2023-03-28 DIAGNOSIS — F32.A DEPRESSION, UNSPECIFIED DEPRESSION TYPE: ICD-10-CM

## 2023-03-28 LAB
AMP AMPHETAMINE 1000 NM/ML POC: NEGATIVE
BAR BARBITURATES 300 NG/ML POC: NEGATIVE
BUP BUPRENORPHINE 10 NG/ML POC: NEGATIVE
BZO BENZODIAZEPINES 300 NG/ML POC: NEGATIVE
COC COCAINE 300 NG/ML POC: NEGATIVE
CREATININE (CR) POC: 100
CTP QC/QA: YES
MET METHAMPHETAMINE 1000 NG/ML POC: NEGATIVE
MOP/OPI300 MORPHINE 300 NG/ML POC: NEGATIVE
MTD METHADONE 300 NG/ML POC: NEGATIVE
OXIDANT (OX) POC: NEGATIVE
OXY OXYCODONE 100 NG/ML POC: NEGATIVE
SPECIFIC GRAVITY (SG) POC: 1.02
TEMPERATURE (°F) POC: 96
THC MARIJUANA 50 NG/ML POC: NEGATIVE

## 2023-03-28 PROCEDURE — 80305 DRUG TEST PRSMV DIR OPT OBS: CPT | Mod: QW,,, | Performed by: NURSE PRACTITIONER

## 2023-03-28 PROCEDURE — 99204 OFFICE O/P NEW MOD 45 MIN: CPT | Mod: ,,, | Performed by: NURSE PRACTITIONER

## 2023-03-28 PROCEDURE — 99204 PR OFFICE/OUTPT VISIT, NEW, LEVL IV, 45-59 MIN: ICD-10-PCS | Mod: ,,, | Performed by: NURSE PRACTITIONER

## 2023-03-28 PROCEDURE — 80305 POCT URINE DRUG SCREEN (WITH BUP): ICD-10-PCS | Mod: QW,,, | Performed by: NURSE PRACTITIONER

## 2023-03-28 RX ORDER — TEMAZEPAM 30 MG/1
30 CAPSULE ORAL NIGHTLY PRN
Qty: 30 CAPSULE | Refills: 3 | Status: CANCELLED | OUTPATIENT
Start: 2023-03-28

## 2023-03-28 RX ORDER — AMLODIPINE BESYLATE 10 MG/1
10 TABLET ORAL DAILY
Qty: 90 TABLET | Refills: 3 | Status: SHIPPED | OUTPATIENT
Start: 2023-03-28 | End: 2024-01-16 | Stop reason: SDUPTHER

## 2023-03-28 RX ORDER — ROSUVASTATIN CALCIUM 10 MG/1
10 TABLET, COATED ORAL DAILY
Qty: 90 TABLET | Refills: 3 | Status: SHIPPED | OUTPATIENT
Start: 2023-03-28 | End: 2024-01-16 | Stop reason: SDUPTHER

## 2023-03-28 RX ORDER — TEMAZEPAM 30 MG/1
30 CAPSULE ORAL NIGHTLY PRN
Qty: 30 CAPSULE | Refills: 3 | Status: SHIPPED | OUTPATIENT
Start: 2023-03-28 | End: 2023-06-27 | Stop reason: SDUPTHER

## 2023-03-28 RX ORDER — ALLOPURINOL 300 MG/1
300 TABLET ORAL DAILY
Qty: 90 TABLET | Refills: 3 | Status: SHIPPED | OUTPATIENT
Start: 2023-03-28 | End: 2024-01-16 | Stop reason: SDUPTHER

## 2023-03-28 RX ORDER — GABAPENTIN 300 MG/1
300 CAPSULE ORAL NIGHTLY
Qty: 90 CAPSULE | Refills: 1 | Status: SHIPPED | OUTPATIENT
Start: 2023-03-28 | End: 2024-01-16 | Stop reason: SDUPTHER

## 2023-03-28 RX ORDER — ESCITALOPRAM OXALATE 20 MG/1
20 TABLET ORAL DAILY
Qty: 90 TABLET | Refills: 3 | Status: SHIPPED | OUTPATIENT
Start: 2023-03-28 | End: 2024-01-16 | Stop reason: SDUPTHER

## 2023-03-28 RX ORDER — VALSARTAN AND HYDROCHLOROTHIAZIDE 320; 25 MG/1; MG/1
1 TABLET, FILM COATED ORAL DAILY
Qty: 90 TABLET | Refills: 3 | Status: SHIPPED | OUTPATIENT
Start: 2023-03-28 | End: 2024-01-16 | Stop reason: SDUPTHER

## 2023-03-28 NOTE — PROGRESS NOTES
Subjective:       Patient ID: Kayla Piper is a 67 y.o. female.    Chief Complaint: Establish Care (Patient is here today to establish care within the [facility] location. Previous PCP Dr. Jimenez in [place], [place]. )    Kayla Piper presents to the clinic today to re-establish primary care   She has been under the care of Dr. Jimenez   She is under the care of Vascular - f/u yearly for history of Right Carotid Bruit-  Under the care of Gastroenterology Dr. Herron- colonoscopy 2013- reports that it was normal.     Patient Active Problem List  Diagnosis  · Rhinitis  · Cervical disc displacement  · Cervical radiculopathy  · Chronic hoarseness  · ETD (eustachian tube dysfunction)  · Laryngopharyngeal reflux (LPR)  · Depression  · Hypertension  · Insomnia  · Gastroesophageal reflux disease without esophagitis  · Carotid stenosis, right  She is in need of her routine maintenance medications. She is on Temazepam for history of insomnia.  reviewed and is consistent with patient's history without any discrepancies/early fills.     Review of Systems    Patient Active Problem List   Diagnosis    Rhinitis    Cervical disc displacement    Cervical radiculopathy    Chronic hoarseness    ETD (eustachian tube dysfunction)    Laryngopharyngeal reflux (LPR)    Depression    Hypertension    Insomnia    Gastroesophageal reflux disease without esophagitis    Carotid stenosis, right       Objective:      Physical Exam  Constitutional:       General: She is not in acute distress.     Appearance: Normal appearance.   Eyes:      Conjunctiva/sclera: Conjunctivae normal.   Neck:      Vascular: Carotid bruit (right) present.   Cardiovascular:      Rate and Rhythm: Normal rate and regular rhythm.      Heart sounds: Normal heart sounds. No murmur heard.  Pulmonary:      Effort: Pulmonary effort is normal. No respiratory distress.      Breath sounds: Normal breath sounds. No wheezing.   Skin:     General:  "Skin is warm and dry.      Findings: No rash.   Neurological:      Mental Status: She is alert and oriented to person, place, and time.   Psychiatric:         Mood and Affect: Mood normal.         Behavior: Behavior normal.         Thought Content: Thought content normal.         Judgment: Judgment normal.       Lab Results   Component Value Date    WBC 5.3 06/14/2019    HGB 12.0 06/14/2019    HCT 38.1 06/14/2019     06/14/2019    CHOL 136 07/05/2022    TRIG 69 07/05/2022    HDL 47 07/05/2022    ALT 22 07/05/2022    AST 19 07/05/2022     07/05/2022    K 4.9 07/05/2022     07/05/2022    CREATININE 1.0 07/05/2022    BUN 25 (H) 07/05/2022    CO2 32 (H) 07/05/2022    HGBA1C 5.7 (H) 07/05/2022     The 10-year ASCVD risk score (Omar GEIGER, et al., 2019) is: 5.6%    Values used to calculate the score:      Age: 67 years      Sex: Female      Is Non- : No      Diabetic: No      Tobacco smoker: No      Systolic Blood Pressure: 118 mmHg      Is BP treated: Yes      HDL Cholesterol: 47 mg/dL      Total Cholesterol: 136 mg/dL  Visit Vitals  /72 (BP Location: Right arm, Patient Position: Sitting, BP Method: Large (Manual))   Pulse 67   Temp 98.2 °F (36.8 °C) (Oral)   Ht 5' 5" (1.651 m)   Wt 82.4 kg (181 lb 10.5 oz)   SpO2 99%   BMI 30.23 kg/m²      Assessment:       1. Long-term current use of benzodiazepine    2. Insomnia, unspecified type    3. White coat syndrome with diagnosis of hypertension    4. Carotid stenosis, asymptomatic, bilateral    5. Pain of left lower extremity    6. Depression, unspecified depression type    7. Gout involving toe of right foot, unspecified cause, unspecified chronicity          Plan:       1. Long-term current use of benzodiazepine  -     POCT Urine Drug Screen (With BUP)  CMA on file    reviewed    2. Insomnia, unspecified type  -     temazepam (RESTORIL) 30 mg capsule; Take 1 capsule (30 mg total) by mouth nightly as needed for Insomnia.  " Dispense: 30 capsule; Refill: 3  Patient is aware of black box warning on medication. Aware of risks of long term benzodiazepine use.   3. White coat syndrome with diagnosis of hypertension  Comments:  Wel controlled.  Continue to monitor at home.    Orders:  -     valsartan-hydrochlorothiazide (DIOVAN-HCT) 320-25 mg per tablet; Take 1 tablet by mouth once daily.  Dispense: 90 tablet; Refill: 3  -     amLODIPine (NORVASC) 10 MG tablet; Take 1 tablet (10 mg total) by mouth once daily.  Dispense: 90 tablet; Refill: 3    4. Carotid stenosis, asymptomatic, bilateral  Comments:  s/p right CEA.  She also had currently nonsignificant stenosis on the left; U/S q 6 months; followed by Dr. Méndez  Orders:  -     rosuvastatin (CRESTOR) 10 MG tablet; Take 1 tablet (10 mg total) by mouth once daily.  Dispense: 90 tablet; Refill: 3    5. Pain of left lower extremity  Comments:  Did resolve with gabapentin so likely was sciatica  Orders:  -     gabapentin (NEURONTIN) 300 MG capsule; Take 1 capsule (300 mg total) by mouth every evening.  Dispense: 90 capsule; Refill: 1    6. Depression, unspecified depression type  Comments:  Stable  Orders:  -     EScitalopram oxalate (LEXAPRO) 20 MG tablet; Take 1 tablet (20 mg total) by mouth once daily.  Dispense: 90 tablet; Refill: 3    7. Gout involving toe of right foot, unspecified cause, unspecified chronicity  -     allopurinoL (ZYLOPRIM) 300 MG tablet; Take 1 tablet (300 mg total) by mouth once daily.  Dispense: 90 tablet; Refill: 3       Follow up in about 3 months (around 6/28/2023).      Future Appointments       Date Provider Specialty Appt Notes    6/20/2023 Janet Aggarwal, [facility] 3 month follow up med refill UDS

## 2023-03-28 NOTE — TELEPHONE ENCOUNTER
Submitted completed KAYLYNN form via manual fax to Dr. Joaquin Méndez's office for processing. Fax number [phone]. Held original KAYLYNN form until document is received, will then submit altogether to medical records department via batch scanning.

## 2023-06-16 RX ORDER — ALBUTEROL SULFATE 90 UG/1
AEROSOL, METERED RESPIRATORY (INHALATION)
Qty: 25.5 G | Refills: 3 | Status: SHIPPED | OUTPATIENT
Start: 2023-06-16

## 2023-06-27 ENCOUNTER — OFFICE VISIT (OUTPATIENT)
Dept: FAMILY MEDICINE | Facility: CLINIC | Age: 68
End: 2023-06-27
Payer: COMMERCIAL

## 2023-06-27 VITALS
OXYGEN SATURATION: 98 % | WEIGHT: 178.13 LBS | BODY MASS INDEX: 29.64 KG/M2 | DIASTOLIC BLOOD PRESSURE: 72 MMHG | TEMPERATURE: 98 F | SYSTOLIC BLOOD PRESSURE: 132 MMHG | HEART RATE: 75 BPM | RESPIRATION RATE: 12 BRPM

## 2023-06-27 DIAGNOSIS — Z79.899 LONG-TERM CURRENT USE OF BENZODIAZEPINE: Primary | ICD-10-CM

## 2023-06-27 DIAGNOSIS — G47.00 INSOMNIA, UNSPECIFIED TYPE: ICD-10-CM

## 2023-06-27 LAB
AMP AMPHETAMINE 1000 NM/ML POC: NEGATIVE
BAR BARBITURATES 300 NG/ML POC: NEGATIVE
BUP BUPRENORPHINE 10 NG/ML POC: NEGATIVE
BZO BENZODIAZEPINES 300 NG/ML POC: ABNORMAL
COC COCAINE 300 NG/ML POC: NEGATIVE
CREATININE (CR) POC: 50
CTP QC/QA: YES
MET METHAMPHETAMINE 1000 NG/ML POC: NEGATIVE
MOP/OPI300 MORPHINE 300 NG/ML POC: NEGATIVE
MTD METHADONE 300 NG/ML POC: NEGATIVE
OXIDANT (OX) POC: NEGATIVE
OXY OXYCODONE 100 NG/ML POC: NEGATIVE
SPECIFIC GRAVITY (SG) POC: 1.02
TEMPERATURE (°F) POC: 92
THC MARIJUANA 50 NG/ML POC: NEGATIVE

## 2023-06-27 PROCEDURE — 80305 DRUG TEST PRSMV DIR OPT OBS: CPT | Mod: QW,,, | Performed by: NURSE PRACTITIONER

## 2023-06-27 PROCEDURE — 80305 POCT URINE DRUG SCREEN (WITH BUP): ICD-10-PCS | Mod: QW,,, | Performed by: NURSE PRACTITIONER

## 2023-06-27 PROCEDURE — 99212 OFFICE O/P EST SF 10 MIN: CPT | Mod: ,,, | Performed by: NURSE PRACTITIONER

## 2023-06-27 PROCEDURE — 99212 PR OFFICE/OUTPT VISIT, EST, LEVL II, 10-19 MIN: ICD-10-PCS | Mod: ,,, | Performed by: NURSE PRACTITIONER

## 2023-06-27 RX ORDER — TEMAZEPAM 30 MG/1
30 CAPSULE ORAL NIGHTLY PRN
Qty: 30 CAPSULE | Refills: 3 | Status: SHIPPED | OUTPATIENT
Start: 2023-06-27 | End: 2023-09-25 | Stop reason: SDUPTHER

## 2023-06-27 NOTE — PROGRESS NOTES
Subjective:       Patient ID: aKyla Piper is a 67 y.o. female.    Chief Complaint: Follow-up (3 month)    Kayla Piper presents to the clinic today for medication refills on her Temazepam that she uses for insomnia.   She has been under the care of Dr. Jimenez, Vascular - f/u yearly for history of Right Carotid Bruit, Gastroenterology Dr. Herron- colonoscopy 2013- reports that it was normal.    Patient Active Problem List  Diagnosis  ·           Rhinitis  ·           Cervical disc displacement  ·           Cervical radiculopathy  ·           Chronic hoarseness  ·           ETD (eustachian tube dysfunction)  ·           Laryngopharyngeal reflux (LPR)  ·           Depression  ·           Hypertension  ·           Insomnia  ·           Gastroesophageal reflux disease without esophagitis  ·           Carotid stenosis, right  She is in need of her routine maintenance medications. She is on Temazepam for history of insomnia.  reviewed and is consistent with patient's history without any discrepancies/early fills.     Denies any new issues,concerns or complaints at today's visit     Review of Systems    Patient Active Problem List   Diagnosis    Rhinitis    Cervical disc displacement    Cervical radiculopathy    Chronic hoarseness    ETD (eustachian tube dysfunction)    Laryngopharyngeal reflux (LPR)    Depression    Hypertension    Insomnia    Gastroesophageal reflux disease without esophagitis    Carotid stenosis, right       Objective:      Physical Exam  Constitutional:       General: She is not in acute distress.     Appearance: Normal appearance.   Cardiovascular:      Rate and Rhythm: Normal rate and regular rhythm.      Heart sounds: Normal heart sounds.   Pulmonary:      Effort: Pulmonary effort is normal. No respiratory distress.      Breath sounds: Normal breath sounds.   Skin:     General: Skin is warm and dry.      Findings: No rash.   Neurological:      Mental Status: She is  alert and oriented to person, place, and time.   Psychiatric:         Mood and Affect: Mood normal.         Behavior: Behavior normal.       Lab Results   Component Value Date    WBC 5.3 06/14/2019    HGB 12.0 06/14/2019    HCT 38.1 06/14/2019     06/14/2019    CHOL 136 07/05/2022    TRIG 69 07/05/2022    HDL 47 07/05/2022    ALT 22 07/05/2022    AST 19 07/05/2022     07/05/2022    K 4.9 07/05/2022     07/05/2022    CREATININE 1.0 07/05/2022    BUN 25 (H) 07/05/2022    CO2 32 (H) 07/05/2022    HGBA1C 5.7 (H) 07/05/2022     The 10-year ASCVD risk score (Omar GEIGER, et al., 2019) is: 6.9%    Values used to calculate the score:      Age: 67 years      Sex: Female      Is Non- : No      Diabetic: No      Tobacco smoker: No      Systolic Blood Pressure: 132 mmHg      Is BP treated: Yes      HDL Cholesterol: 47 mg/dL      Total Cholesterol: 136 mg/dL  Visit Vitals  /72 (BP Location: Left arm, Patient Position: Sitting, BP Method: Medium (Manual))   Pulse 75   Temp 98.3 °F (36.8 °C) (Oral)   Resp 12   Wt 80.8 kg (178 lb 2.1 oz)   SpO2 98%   BMI 29.64 kg/m²      Assessment:       1. Long-term current use of benzodiazepine    2. Insomnia, unspecified type        Plan:       1. Long-term current use of benzodiazepine  -     POCT Urine Drug Screen (With BUP)   WNL  UDS WNL  CMA on file   2. Insomnia, unspecified type  -     temazepam (RESTORIL) 30 mg capsule; Take 1 capsule (30 mg total) by mouth nightly as needed for Insomnia.  Dispense: 30 capsule; Refill: 3  Continue current medication as prescribed      Follow up in about 3 months (around 9/27/2023).      Future Appointments       Date Provider Specialty Appt Notes    9/19/2023 Janet Aggarwal NP Family Medicine 3 month follow up

## 2023-07-18 DIAGNOSIS — M25.561 RIGHT KNEE PAIN, UNSPECIFIED CHRONICITY: Primary | ICD-10-CM

## 2023-07-24 ENCOUNTER — HOSPITAL ENCOUNTER (OUTPATIENT)
Dept: RADIOLOGY | Facility: HOSPITAL | Age: 68
Discharge: HOME | End: 2023-07-24
Attending: ORTHOPAEDIC SURGERY
Payer: COMMERCIAL

## 2023-07-24 ENCOUNTER — OFFICE VISIT (OUTPATIENT)
Dept: ORTHOPEDICS | Facility: CLINIC | Age: 68
End: 2023-07-24
Payer: COMMERCIAL

## 2023-07-24 ENCOUNTER — TELEPHONE (OUTPATIENT)
Dept: FAMILY MEDICINE | Facility: CLINIC | Age: 68
End: 2023-07-24
Payer: COMMERCIAL

## 2023-07-24 VITALS — HEIGHT: 65 IN | RESPIRATION RATE: 18 BRPM | WEIGHT: 178 LBS | BODY MASS INDEX: 29.66 KG/M2

## 2023-07-24 DIAGNOSIS — M17.10 ARTHRITIS OF KNEE: ICD-10-CM

## 2023-07-24 DIAGNOSIS — M25.561 RIGHT KNEE PAIN, UNSPECIFIED CHRONICITY: Primary | ICD-10-CM

## 2023-07-24 DIAGNOSIS — M25.561 RIGHT KNEE PAIN, UNSPECIFIED CHRONICITY: ICD-10-CM

## 2023-07-24 PROCEDURE — 99204 OFFICE O/P NEW MOD 45 MIN: CPT | Mod: 57,S$GLB,, | Performed by: ORTHOPAEDIC SURGERY

## 2023-07-24 PROCEDURE — 73564 XR KNEE ORTHO RIGHT WITH FLEXION: ICD-10-PCS | Mod: 26,RT,, | Performed by: RADIOLOGY

## 2023-07-24 PROCEDURE — 73564 X-RAY EXAM KNEE 4 OR MORE: CPT | Mod: TC,PO,RT

## 2023-07-24 PROCEDURE — 99204 PR OFFICE/OUTPT VISIT, NEW, LEVL IV, 45-59 MIN: ICD-10-PCS | Mod: 57,S$GLB,, | Performed by: ORTHOPAEDIC SURGERY

## 2023-07-24 PROCEDURE — 73564 X-RAY EXAM KNEE 4 OR MORE: CPT | Mod: 26,RT,, | Performed by: RADIOLOGY

## 2023-07-24 PROCEDURE — 73562 X-RAY EXAM OF KNEE 3: CPT | Mod: 26,LT,, | Performed by: RADIOLOGY

## 2023-07-24 PROCEDURE — 99999 PR PBB SHADOW E&M-EST. PATIENT-LVL III: CPT | Mod: PBBFAC,,, | Performed by: ORTHOPAEDIC SURGERY

## 2023-07-24 PROCEDURE — 99999 PR PBB SHADOW E&M-EST. PATIENT-LVL III: ICD-10-PCS | Mod: PBBFAC,,, | Performed by: ORTHOPAEDIC SURGERY

## 2023-07-24 PROCEDURE — 73562 XR KNEE ORTHO RIGHT WITH FLEXION: ICD-10-PCS | Mod: 26,LT,, | Performed by: RADIOLOGY

## 2023-07-24 NOTE — TELEPHONE ENCOUNTER
----- Message from Maldonado Rae sent at 7/24/2023 11:21 AM CDT -----  Regarding: pre op clearance  Patient was seen today by Dr. Kearney and consented for right total knee arthroplasty.  Please advise on pre op medical clearance for this patient.  Once clearance has been given we will contact the patient to schedule the surgery.     Asa

## 2023-07-24 NOTE — PROGRESS NOTES
Past Medical History:   Diagnosis Date    Carotid stenosis, right     DDD (degenerative disc disease), cervical     Depression     Hyperkalemia     Hypertension     Insomnia     Rhinitis     Synovial cyst of popliteal space        Past Surgical History:   Procedure Laterality Date    ANTERIOR CERVICAL DISCECTOMY W/ FUSION      APPENDECTOMY      CAROTID ENDARTERECTOMY Right 10/03/2018    COLONOSCOPY  2013    KNEE SURGERY Right     TUBAL LIGATION  2000    UMBILICAL HERNIA REPAIR  12/14/2022       Current Outpatient Medications   Medication Sig    albuterol (PROVENTIL/VENTOLIN HFA) 90 mcg/actuation inhaler USE 2 INHALATIONS EVERY 4 TO 6 HOURS AS NEEDED FOR SHORTNESS OF BREATH    allopurinoL (ZYLOPRIM) 300 MG tablet Take 1 tablet (300 mg total) by mouth once daily.    amLODIPine (NORVASC) 10 MG tablet Take 1 tablet (10 mg total) by mouth once daily.    aspirin 325 MG tablet Take 325 mg by mouth once daily.    cetirizine (ZYRTEC) 10 MG tablet Take 10 mg by mouth once daily.    EScitalopram oxalate (LEXAPRO) 20 MG tablet Take 1 tablet (20 mg total) by mouth once daily.    fluticasone propionate (FLONASE) 50 mcg/actuation nasal spray 1 spray by Each Nare route once daily.    gabapentin (NEURONTIN) 300 MG capsule Take 1 capsule (300 mg total) by mouth every evening.    multivitamin (THERAGRAN) per tablet Take 1 tablet by mouth once daily.    progesterone (PROMETRIUM) 200 MG capsule Take 1 capsule by mouth once daily.    rosuvastatin (CRESTOR) 10 MG tablet Take 1 tablet (10 mg total) by mouth once daily.    temazepam (RESTORIL) 30 mg capsule Take 1 capsule (30 mg total) by mouth nightly as needed for Insomnia.    valsartan-hydrochlorothiazide (DIOVAN-HCT) 320-25 mg per tablet Take 1 tablet by mouth once daily.     No current facility-administered medications for this visit.       Review of patient's allergies indicates:  No Known Allergies    Family History   Problem Relation Age of Onset    Hypertension Mother     Coronary  artery disease Mother     Lung cancer Father     Hypertension Sister        Social History     Socioeconomic History    Marital status:    Occupational History    Occupation:    Tobacco Use    Smoking status: Former     Packs/day: 0.25     Years: 18.00     Pack years: 4.50     Types: Cigarettes     Start date:      Quit date:      Years since quittin.5    Smokeless tobacco: Never   Substance and Sexual Activity    Alcohol use: Yes     Comment: occasional    Drug use: No    Sexual activity: Yes     Partners: Male     Birth control/protection: None     Comment:    Social History Narrative    Live with        Chief Complaint: No chief complaint on file.      History of present illness:  67-year-old female seen for chronic right knee pain.  Patient's knee pain is continued to worsen over last few years.  Patient had a previous meniscus repair in that knee.  She is then tried cortisone, a stem cell series as well as a viscosupplementation series.  Patient states that her pain is still as high as an 8/10.  Hard to go up and down stairs.  Patient gets some swelling.      Review of Systems:    Constitution: Negative for chills, fever, and sweats.  Negative for unexplained weight loss.    HENT:  Negative for headaches and blurry vision.    Cardiovascular:Negative for chest pain or irregular heart beat. Negative for hypertension.    Respiratory:  Negative for cough and shortness of breath.    Gastrointestinal: Negative for abdominal pain, heartburn, melena, nausea, and vomitting.    Genitourinary:  Negative bladder incontinence and dysuria.    Musculoskeletal:  See HPI    Neurological: Negative for numbness.    Psychiatric/Behavioral: Negative for depression.  The patient is not nervous/anxious.      Endocrine: Negative for polyuria    Hematologic/Lymphatic: Negative for bleeding problem.  Does not bruise/bleed easily.    Skin: Negative for poor would healing and  rash      Physical Examination:    Vital Signs:  There were no vitals filed for this visit.    There is no height or weight on file to calculate BMI.    This a well-developed, well nourished patient in no acute distress.  They are alert and oriented and cooperative to examination.  Pt. walks without an antalgic gait.      Examination of the right knee shows no rashes or erythema. There are no masses ecchymosis or effusion. Patient has full range of motion from 0-130°. Patient is moderately tender to palpation over lateral joint line and nontender to palpation over the medial joint line. Patient has a - Lachman exam, - anterior drawer exam, and - posterior drawer exam. - Apley exam. Knee is stable to varus and valgus stress. 5 out of 5 motor strength. Palpable distal pulses. Intact light touch sensation. Negative Patellofemoral crepitus    Heart is regular rate without obvious murmurs   Normal respiratory effort without audible wheezing  Abdomen is soft and nontender     X-rays:  X-rays of the right knee are ordered and reviewed which show complete lateral joint space narrowing of the right knee     Assessment::  Right valgus knee arthritis    Plan:  I reviewed the findings with her today.  We talked about treatment options for the osteoarthritis in her knee.  Patient has exhausted all conservative options.  We talked in detail about robotic assisted total knee arthroplasty and she would like to schedule this.  Risks, benefits, and alternatives to the procedure were explained to the patient including but not limited to damage to nerves, arteries, blood vessels, bones, tendons, ligaments, stiffness, instability, infection, permanent limb dysfunction, DVT, PE, as well as general anesthetic complications including seizure, stroke, heart attack and even death. The patient understood these risks and wished to proceed and signed the informed consent.       All previous pertinent notes including ER visits, physical therapy  visits, other orthopedic visits as well as other care for the same musculoskeletal problem were reviewed.  All pertinent lab values and previous imaging was reviewed pertinent to the current visit.    This note was created using [employer] voice recognition software that occasionally misinterpreted phrases or words.    Consult note is delivered via Epic messaging service.

## 2023-07-25 ENCOUNTER — HOSPITAL ENCOUNTER (OUTPATIENT)
Dept: RADIOLOGY | Facility: CLINIC | Age: 68
Discharge: HOME | End: 2023-07-25
Attending: FAMILY MEDICINE
Payer: COMMERCIAL

## 2023-07-25 ENCOUNTER — LAB VISIT (OUTPATIENT)
Dept: LAB | Facility: CLINIC | Age: 68
End: 2023-07-25
Payer: COMMERCIAL

## 2023-07-25 ENCOUNTER — OFFICE VISIT (OUTPATIENT)
Dept: FAMILY MEDICINE | Facility: CLINIC | Age: 68
End: 2023-07-25
Payer: COMMERCIAL

## 2023-07-25 VITALS
DIASTOLIC BLOOD PRESSURE: 70 MMHG | WEIGHT: 173.94 LBS | TEMPERATURE: 98 F | SYSTOLIC BLOOD PRESSURE: 132 MMHG | BODY MASS INDEX: 28.95 KG/M2 | RESPIRATION RATE: 12 BRPM | OXYGEN SATURATION: 98 % | HEART RATE: 73 BPM

## 2023-07-25 DIAGNOSIS — Z01.818 PRE-OP EXAM: ICD-10-CM

## 2023-07-25 DIAGNOSIS — Z01.818 PRE-OP EXAM: Primary | ICD-10-CM

## 2023-07-25 LAB
ALBUMIN SERPL BCP-MCNC: 4.1 G/DL (ref 3.5–5.2)
ALP SERPL-CCNC: 65 U/L (ref 55–135)
ALT SERPL W/O P-5'-P-CCNC: 17 U/L (ref 10–44)
ANION GAP SERPL CALC-SCNC: 11 MMOL/L (ref 8–16)
AST SERPL-CCNC: 24 U/L (ref 10–40)
BASOPHILS # BLD AUTO: 0.05 K/UL (ref 0–0.2)
BASOPHILS NFR BLD: 0.7 % (ref 0–1.9)
BILIRUB SERPL-MCNC: 0.4 MG/DL (ref 0.1–1)
BUN SERPL-MCNC: 18 MG/DL (ref 8–23)
CALCIUM SERPL-MCNC: 10.4 MG/DL (ref 8.7–10.5)
CHLORIDE SERPL-SCNC: 103 MMOL/L (ref 95–110)
CO2 SERPL-SCNC: 26 MMOL/L (ref 23–29)
CREAT SERPL-MCNC: 1 MG/DL (ref 0.5–1.4)
DIFFERENTIAL METHOD: ABNORMAL
EOSINOPHIL # BLD AUTO: 0.1 K/UL (ref 0–0.5)
EOSINOPHIL NFR BLD: 1.5 % (ref 0–8)
ERYTHROCYTE [DISTWIDTH] IN BLOOD BY AUTOMATED COUNT: 13.5 % (ref 11.5–14.5)
EST. GFR  (NO RACE VARIABLE): >60 ML/MIN/1.73 M^2
GLUCOSE SERPL-MCNC: 87 MG/DL (ref 70–110)
HCT VFR BLD AUTO: 39 % (ref 37–48.5)
HGB BLD-MCNC: 12.4 G/DL (ref 12–16)
IMM GRANULOCYTES # BLD AUTO: 0.01 K/UL (ref 0–0.04)
IMM GRANULOCYTES NFR BLD AUTO: 0.1 % (ref 0–0.5)
LYMPHOCYTES # BLD AUTO: 1.9 K/UL (ref 1–4.8)
LYMPHOCYTES NFR BLD: 25.6 % (ref 18–48)
MCH RBC QN AUTO: 27.5 PG (ref 27–31)
MCHC RBC AUTO-ENTMCNC: 31.8 G/DL (ref 32–36)
MCV RBC AUTO: 87 FL (ref 82–98)
MONOCYTES # BLD AUTO: 0.4 K/UL (ref 0.3–1)
MONOCYTES NFR BLD: 5.6 % (ref 4–15)
NEUTROPHILS # BLD AUTO: 4.9 K/UL (ref 1.8–7.7)
NEUTROPHILS NFR BLD: 66.5 % (ref 38–73)
NRBC BLD-RTO: 0 /100 WBC
PLATELET # BLD AUTO: 215 K/UL (ref 150–450)
PMV BLD AUTO: 11.5 FL (ref 9.2–12.9)
POTASSIUM SERPL-SCNC: 4.5 MMOL/L (ref 3.5–5.1)
PROT SERPL-MCNC: 7.4 G/DL (ref 6–8.4)
RBC # BLD AUTO: 4.51 M/UL (ref 4–5.4)
SODIUM SERPL-SCNC: 140 MMOL/L (ref 136–145)
WBC # BLD AUTO: 7.37 K/UL (ref 3.9–12.7)

## 2023-07-25 PROCEDURE — 99214 PR OFFICE/OUTPT VISIT, EST, LEVL IV, 30-39 MIN: ICD-10-PCS | Mod: ,,, | Performed by: FAMILY MEDICINE

## 2023-07-25 PROCEDURE — 71046 XR CHEST PA AND LATERAL: ICD-10-PCS | Mod: 26,,, | Performed by: FAMILY MEDICINE

## 2023-07-25 PROCEDURE — 93010 EKG 12-LEAD: ICD-10-PCS | Mod: ,,, | Performed by: INTERNAL MEDICINE

## 2023-07-25 PROCEDURE — 71046 X-RAY EXAM CHEST 2 VIEWS: CPT | Mod: TC,,, | Performed by: RADIOLOGY

## 2023-07-25 PROCEDURE — 71046 XR CHEST PA AND LATERAL: ICD-10-PCS | Mod: TC,,, | Performed by: RADIOLOGY

## 2023-07-25 PROCEDURE — 85025 COMPLETE CBC W/AUTO DIFF WBC: CPT | Performed by: FAMILY MEDICINE

## 2023-07-25 PROCEDURE — 71046 X-RAY EXAM CHEST 2 VIEWS: CPT | Mod: 26,,, | Performed by: FAMILY MEDICINE

## 2023-07-25 PROCEDURE — 93005 ELECTROCARDIOGRAM TRACING: CPT | Mod: ,,, | Performed by: FAMILY MEDICINE

## 2023-07-25 PROCEDURE — 93010 ELECTROCARDIOGRAM REPORT: CPT | Mod: ,,, | Performed by: INTERNAL MEDICINE

## 2023-07-25 PROCEDURE — 93005 EKG 12-LEAD: ICD-10-PCS | Mod: ,,, | Performed by: FAMILY MEDICINE

## 2023-07-25 PROCEDURE — 99214 OFFICE O/P EST MOD 30 MIN: CPT | Mod: ,,, | Performed by: FAMILY MEDICINE

## 2023-07-25 PROCEDURE — 80053 COMPREHEN METABOLIC PANEL: CPT | Performed by: FAMILY MEDICINE

## 2023-07-25 NOTE — PROGRESS NOTES
Subjective:       Patient ID: Kayla Piper is a 67 y.o. female.    Chief Complaint: Pre-op Exam    Ms. Piper is a 67-year-old female who is here today for preop for an upcoming right total knee replacement by Dr. Gusman, ortho in [place].  She is fasting today so we will get her labs done, EKG, and her chest x-ray.      This patient has never been a smoker nor does she have cholesterol issues, however she had a blocked right carotid artery and had to have a carotid endarterectomy in 2018.  This was done by Dr. Méndez, vascular surgeon and she has not had any problems since that time.  She had a complete cardiology workup approximately 2 years ago at [facility] by Dr. Rao.  She had cataract surgery in 2021 and a cervical diskectomy in 2015.  She had no surgical complications nor any problems with anesthesia during any of these procedures.  I do not anticipate her having any issues with her upcoming procedure, but will reassess once the chest x-ray, EKG and labs are resulted    Update:     Ms. Piper had normal labs (CBC and CMP), a normal EKG and a clear chest x-ray.  Based on the limited exam and chart review done today as well as the most recent lab and studies feel that Ms. Piper is a reasonable risk for her upcoming total knee replacement    I recommend use of standard pre-op and post-op precautions for this patient.   In my opinion, based on this limited assessment, she is medically optimized for this procedure, and can proceed without further evaluation.     Review of Systems   Constitutional:  Positive for fever. Negative for fatigue.   HENT:  Negative for postnasal drip and sinus pressure.    Respiratory:  Negative for shortness of breath and wheezing.    Cardiovascular:  Negative for chest pain, palpitations and leg swelling.   Gastrointestinal:  Negative for diarrhea, nausea and vomiting.        Gerd   Genitourinary:  Negative for dysuria and frequency.   Musculoskeletal:   Negative for arthralgias and back pain.   Skin:  Negative for color change and rash.   Allergic/Immunologic: Positive for environmental allergies.   Neurological:  Negative for light-headedness and headaches.   Hematological:  Bruises/bleeds easily.   Psychiatric/Behavioral:  The patient is nervous/anxious.      Patient Active Problem List   Diagnosis    Rhinitis    Cervical disc displacement    Cervical radiculopathy    Chronic hoarseness    ETD (eustachian tube dysfunction)    Laryngopharyngeal reflux (LPR)    Depression    Hypertension    Insomnia    Gastroesophageal reflux disease without esophagitis    Carotid stenosis, right       Objective:      Physical Exam  Constitutional:       Appearance: Normal appearance.   HENT:      Head: Normocephalic and atraumatic.      Mouth/Throat:      Mouth: Mucous membranes are moist.   Eyes:      Extraocular Movements: Extraocular movements intact.      Pupils: Pupils are equal, round, and reactive to light.   Neck:      Vascular: No carotid bruit.   Cardiovascular:      Rate and Rhythm: Normal rate and regular rhythm.      Pulses: Normal pulses.      Heart sounds: Normal heart sounds.   Pulmonary:      Effort: Pulmonary effort is normal.      Breath sounds: Normal breath sounds. No wheezing or rhonchi.   Musculoskeletal:      Comments: Right knee pain   Skin:     General: Skin is warm and dry.   Neurological:      General: No focal deficit present.      Mental Status: She is alert and oriented to person, place, and time.   Psychiatric:         Mood and Affect: Mood normal.         Behavior: Behavior normal.         Thought Content: Thought content normal.         Judgment: Judgment normal.       Lab Results   Component Value Date    WBC 7.37 07/25/2023    HGB 12.4 07/25/2023    HCT 39.0 07/25/2023     07/25/2023    CHOL 136 07/05/2022    TRIG 69 07/05/2022    HDL 47 07/05/2022    ALT 17 07/25/2023    AST 24 07/25/2023     07/25/2023    K 4.5 07/25/2023    CL  103 07/25/2023    CREATININE 1.0 07/25/2023    BUN 18 07/25/2023    CO2 26 07/25/2023    HGBA1C 5.7 (H) 07/05/2022     The 10-year ASCVD risk score (Omar GEIGER, et al., 2019) is: 6.9%    Values used to calculate the score:      Age: 67 years      Sex: Female      Is Non- : No      Diabetic: No      Tobacco smoker: No      Systolic Blood Pressure: 132 mmHg      Is BP treated: Yes      HDL Cholesterol: 47 mg/dL      Total Cholesterol: 136 mg/dL  Visit Vitals  /70 (BP Location: Right arm, Patient Position: Sitting, BP Method: Medium (Manual))   Pulse 73   Temp 98.1 °F (36.7 °C) (Oral)   Resp 12   Wt 78.9 kg (173 lb 15.1 oz)   SpO2 98%   BMI 28.95 kg/m²      Assessment:       1. Pre-op exam        Plan:       1. Pre-op exam  -     Comprehensive Metabolic Panel; Future; Expected date: 07/25/2023  -     CBC Auto Differential; Future; Expected date: 07/25/2023  -     Urinalysis, Reflex to Urine Culture Urine, Clean Catch  -     X-Ray Chest PA And Lateral; Future; Expected date: 07/25/2023  -     IN OFFICE EKG 12-LEAD (to [place])         I recommend use of standard pre-op and post-op precautions for this patient.   In my opinion, based on this limited assessment, she is medically optimized for this procedure, and can proceed without further evaluation.       Future Appointments       Date Provider Specialty Appt Notes    9/19/2023 Janet Aggarwal NP Family Medicine 3 month follow up

## 2023-07-26 ENCOUNTER — TELEPHONE (OUTPATIENT)
Dept: ORTHOPEDICS | Facility: CLINIC | Age: 68
End: 2023-07-26
Payer: COMMERCIAL

## 2023-07-26 NOTE — TELEPHONE ENCOUNTER
----- Message from Rosette Tai LPN sent at 7/25/2023  2:24 PM CDT -----  Regarding: FW: Needs return call    ----- Message -----  From: Shiela Aden  Sent: 7/25/2023   2:22 PM CDT  To: [facility] Staff  Subject: Needs return call                                Type: Needs Medical Advice  Who Called:  Kayla    Jt Call Back Number: [phone]    Additional Information: Pt is needing to check on the pre auth for her please call to advice patient she would liek to speak to someone today regarding this if psosible

## 2023-07-26 NOTE — TELEPHONE ENCOUNTER
Called and spoke to patient on 7/25/23.  Informed her that once we schedule her surgery our pre service department will obtain auth for her surgery.      Asa

## 2023-08-10 ENCOUNTER — PATIENT MESSAGE (OUTPATIENT)
Dept: ORTHOPEDICS | Facility: CLINIC | Age: 68
End: 2023-08-10
Payer: COMMERCIAL

## 2023-08-10 ENCOUNTER — TELEPHONE (OUTPATIENT)
Dept: ORTHOPEDICS | Facility: CLINIC | Age: 68
End: 2023-08-10
Payer: COMMERCIAL

## 2023-08-10 DIAGNOSIS — M17.11 PRIMARY OSTEOARTHRITIS OF RIGHT KNEE: Primary | ICD-10-CM

## 2023-08-10 RX ORDER — MUPIROCIN 20 MG/G
OINTMENT TOPICAL
Status: CANCELLED | OUTPATIENT
Start: 2023-08-10

## 2023-08-10 NOTE — TELEPHONE ENCOUNTER
----- Message from Rosette Tai LPN sent at 8/9/2023  9:34 AM CDT -----  Contact: pt    ----- Message -----  From: Carla Grimes MA  Sent: 8/9/2023   9:33 AM CDT  To: [facility] Staff    Wants to schedule surgery   Call back  [phone]

## 2023-08-17 ENCOUNTER — TELEPHONE (OUTPATIENT)
Dept: ORTHOPEDICS | Facility: CLINIC | Age: 68
End: 2023-08-17
Payer: COMMERCIAL

## 2023-08-17 NOTE — TELEPHONE ENCOUNTER
Called and left .  Informed her that it appears out pre service staff has not requested auth yet for upcoming TKA.  It is still over a month from surgery.  But I did send a message to our pre service staff yesterday to look into it.     Asa

## 2023-08-17 NOTE — TELEPHONE ENCOUNTER
----- Message from Rosette Tai LPN sent at 8/17/2023  2:17 PM CDT -----  Contact: [place]    ----- Message -----  From: Hoang Reyes MA  Sent: 8/17/2023  12:48 PM CDT  To: [facility] Staff    Does not have the authorization for patients surgery.    Call back number is [phone], direct line with secure voicemail

## 2023-09-14 ENCOUNTER — HOSPITAL ENCOUNTER (OUTPATIENT)
Dept: PREADMISSION TESTING | Facility: HOSPITAL | Age: 68
Discharge: HOME | End: 2023-09-14
Attending: ORTHOPAEDIC SURGERY
Payer: COMMERCIAL

## 2023-09-14 ENCOUNTER — HOSPITAL ENCOUNTER (OUTPATIENT)
Dept: RADIOLOGY | Facility: HOSPITAL | Age: 68
Discharge: HOME | End: 2023-09-14
Attending: ORTHOPAEDIC SURGERY
Payer: COMMERCIAL

## 2023-09-14 DIAGNOSIS — M17.11 PRIMARY OSTEOARTHRITIS OF RIGHT KNEE: ICD-10-CM

## 2023-09-14 DIAGNOSIS — Z01.818 PREOPERATIVE TESTING: Primary | ICD-10-CM

## 2023-09-14 DIAGNOSIS — M17.11 PRIMARY OSTEOARTHRITIS OF RIGHT KNEE: Primary | ICD-10-CM

## 2023-09-14 PROCEDURE — 73700 CT KNEE WITHOUT CONTRAST RIGHT W/MAKO PROTOCOL: ICD-10-PCS | Mod: 26,RT,, | Performed by: RADIOLOGY

## 2023-09-14 PROCEDURE — 73700 CT LOWER EXTREMITY W/O DYE: CPT | Mod: TC,RT

## 2023-09-14 PROCEDURE — 73700 CT LOWER EXTREMITY W/O DYE: CPT | Mod: 26,RT,, | Performed by: RADIOLOGY

## 2023-09-14 NOTE — PRE ADMISSION SCREENING
JOINT CAMP ASSESSMENT    Name Kayla Piper   MRN [id number]    Age/Sex 67 y.o. female    Surgeon Dr. Mao Kearney   Joint Camp Date 9/14/2023   Surgery Date 9/26/2023   Procedure Right Knee Arthroplasty   Insurance Payor: AETNA / Plan: AET[employer] CHOICE POS / Product Type: Commercial /    Care Team Patient Care Team:  Janet Aggarwal NP as PCP - General (Family Medicine)  Tracy Keene MD as Consulting Physician (Obstetrics)  Jose Saenz MD as Consulting Physician (Orthopedic Surgery)  Oskar Santillan MD as Consulting Physician (Orthopedic Surgery)  Joaquin Miller MD as Consulting Physician (Neurosurgery)  Jose Rao MD as Consulting Physician (Cardiology)  Joaquin Méndez MD as Consulting Physician (Vascular Surgery)  Imaging, Dis Diagnostic (Diagnostic Radiology)    Pharmacy   [facility]  for [place], [place] - [facility] [place] [id number]  Phone: [phone] Fax: [phone]    [id number] - [place], [place] - [address] AT [facility] [address]  [address]  [place] [address]  Phone: [phone] Fax: [phone]    [facility] - [place], [place] - [facility]  [address] [facility] [address]  Phone: [phone] Fax: [phone]     AM-PAC Score   24   Risk Assessment Score 2     Past Medical History:   Diagnosis Date    Carotid stenosis, right     DDD (degenerative disc disease), cervical     Depression     Hyperkalemia     Hypertension     Insomnia     Rhinitis     Synovial cyst of popliteal space        Past Surgical History:   Procedure Laterality Date    ANTERIOR CERVICAL DISCECTOMY W/ FUSION      APPENDECTOMY      CAROTID ENDARTERECTOMY Right 10/03/2018    COLONOSCOPY  2013    KNEE SURGERY Right     REPAIR MENISCUS SCOPE    TUBAL LIGATION  2000    UMBILICAL HERNIA REPAIR  12/14/2022         Home Enviroment     Living Arrangement: Lives with  spouse  Home Environment: 2-story house, number of inside stairs: 14, can live on one level, bedroom on 2nd floor, bathroom on 2nd floor, walk-in shower  Home Safety Concerns: Pets in the home: dogs (2).    DISCHARGE CAREGIVER/SUPPORT SYSTEM     Identified post-op caregiver: Patient has spouse / significant other.  Patient's caregiver(s) will be able to provide physical assistance. Patient will have someone to assist overnight.      Caregiver present at pre-op interview:  No      PRE-OPERATIVE FUNCTIONAL STATUS     Employment: Employed full time    Pre-op Functional Status: Patient is independent with mobility/ambulation, transfers, ADL's, IADL's.    Use of assistive device for ambulation: none  ADL: self care  ADL Limitations: difficulty with walking  Medical Restrictions: Unstable ambulation and Decreased range of motions in extremities    POTENTIAL BARRIERS TO DISCHARGE/POTENTIAL POST-OP COMPLICATIONS     Patient with hx of HTN, POSSIBLE SAME DAY DISCHARGE.    DISCHARGE PLAN     Expected LOS of 1 days or less for joint replacement discussed with patient. We also discussed a discharge path of HH for approximately two weeks with a transition to outpatient PT on the third week given no post-op complications.      Patient in agreement with discharge plan: Yes    Discharge to: Discharge home with home health (PT/OT) x2 weeks with transition to outpatient PT     HH:  [facility] ([place] Patient disclosure form completed and sent to case management for upload to the medical record.      OP PT: [facility].     Home DME: rolling walker    Needed DME at D/C: bedside commode     Rx: Per Dr. Kearney at discharge     Meds to Beds: Yes  Patient expected to discharge on  current regimen of Aspirin 325 mg once daily  for DVT prophylaxis.

## 2023-09-14 NOTE — PRE ADMISSION SCREENING
"               CJR Risk Assessment Scale    Patient Name: Kayla Piper  YOB: 1955  MRN: [id number]            RIsk Factor Measure Recommendation Patient Data Scale/Score   BMI >40 Reconsider surgery, weight loss   Estimated body mass index is 28.95 kg/m² as calculated from the following:    Height as of 7/24/23: 5' 5" (1.651 m).    Weight as of 7/25/23: 78.9 kg (173 lb 15.1 oz).   [] 0 = 1 - 24.9  [x] 1 = 25-29.9  [] 2 = 30-34.9  [] 3 = 35-39.9  [] 4 = 40-44.9  [] 5 = 45-99.9   Hemoglobin AIC (if applicable) >9 Delay surgery until DM under control  Refer for:  Nutrition Therapy  Exercise   Medication    Lab Results   Component Value Date    HGBA1C 5.7 (H) 07/05/2022       Lab Results   Component Value Date    GLU 98 09/14/2023      [] 0 = 4.0-5.6  [x] 1 = 5.7-6.4  [] 2 = 6.5-6.9  [] 3 = 7.0-7.9  [] 4 = 8.0-8.9  [] 5 = 9.0-12   Hemoglobin (Anemia) <9 Delay surgery   Correct anemia Lab Results   Component Value Date    HGB 12.3 09/14/2023    [] 20 - <9.0                    Albumin <3 Delay surgery &Workup Lab Results   Component Value Date    ALBUMIN 4.1 07/25/2023    [] 20 - <3.0   Smoking Cessation >4 Weeks Delay Surgery  Refer to OP Cessation Class    Former Smoker  Quit: 2000 [] 20 - current smoker                                _____ PPD                    Hx of MI, PE, Arrhythmia, CVA, DVT <30 Days Delay Surgery    N/A [] 20      Infection Variable Delay surgery and re-evaluate   N/A [] 20 - recent/current infection     Depression (PHQ) >10 out of 27 Delay Surgery and re-evaluate  Medication  Counseling              [x] 0     []1     []2     []3      []4      [] 5                    (1-4)      (5-9)  (10-14)  (15-19)   (20-27)     Memory Impairment & Memory loss (Mini-Cog Screening Tool) Advanced dementia and/or Parkinson's Reconsider surgery     [x] 0     []1     []2     []3     []4     [] 5     Physical Conditioning (Modified AM-PAC Per Physical Therapy at Joint Camp) Unable to ambulate " on day of surgery Delay surgery and re-evaluate  Pre-Rehabilitation   (PT evaluation)       [x]  0   []4       []8     []12        []16     []20       (<20%)   (<40%)   (<60%)   (<80% )    (>80%)     Home Environment/Caregiver support  (Per /Navigator Interview)    Availability of basic services and/or approprate assistance during post-operative period Delay surgery and re-evaluate  Safe home environment  Health   1 week post-surgery  Transportation  availability  Ability to obtain DME/Medications post-op    [x] 0     []1     []2     []3     []4     [] 5  [x] 0     []1     []2     []3     []4     [] 5  [x] 0     []1     []2     []3     []4     [] 5  [x] 0     []1     []2     []3     []4     [] 5         MD Contact: Dr. Kearney Comments:  Total Score:  2

## 2023-09-14 NOTE — DISCHARGE INSTRUCTIONS
To confirm, Your doctor has instructed you that surgery is scheduled for: 9/26/23 WITH DR. ESTRADA    Please report to [facility], Registration the morning of surgery. You must check-in and receive a wristband before going to your procedure.  [address] [place] [address] [place] [address]    Pre-Op will call the afternoon prior to surgery between 1:00 and 6:00 PM with the final arrival time.  Phone number: [phone]    PLEASE NOTE:  The surgery schedule has many variables which may affect the time of your surgery case.  Family members should be available if your surgery time changes.  Plan to be here the day of your procedure between 4-6 hours.    MEDICATIONS:  TAKE ONLY THESE MEDICATIONS WITH A SMALL SIP OF WATER THE MORNING OF YOUR PROCEDURE:    SEE MED LIST      DO NOT TAKE THESE MEDICATIONS 5-7 DAYS PRIOR to your procedure or per your surgeon's request:   ASPIRIN, ALEVE, ADVIL, IBUPROFEN, FISH OIL VITAMIN E, HERBALS  (May take Tylenol)    ONLY if you are prescribed any types of blood thinners such as:  Aspirin, Coumadin, Plavix, Pradaxa, Xarelto, Aggrenox, Effient, Eliquis, Savasya, Brilinta, or any other, ask your surgeon whether you should stop taking them and how long before surgery you should stop.  You may also need to verify with the prescribing physician if it is ok to stop your medication.      INSTRUCTIONS IMPORTANT!!  Do not eat or drink anything between midnight and the time of your procedure- this includes gum, mints, and candy.  Do not smoke or drink alcoholic beverages 24 hours prior to your procedure.  Shower the night before AND the morning of your procedure with a Chlorhexidine wash such as Hibiclens or Dial antibacterial soap from the neck down.  Do not get it on your face or in your eyes.  You may use your own shampoo and face wash. This helps your skin to be as bacteria free as possible.    If you wear contact lenses, dentures, hearing aids or glasses, bring a container to  put them in during surgery and give to a family member for safe keeping.  Please leave all jewelry, piercing's and valuables at home. You must remove your false eyelashes prior to surgery.    DO NOT remove hair from the surgery site.  Do not shave the incision site unless you are given specific instructions to do so.    ONLY if you have been diagnosed with sleep apnea please bring your C-PAP machine.  ONLY if you wear home oxygen please bring your portable oxygen tank the day of your procedure.  ONLY if you have a history of OPEN HEART SURGERY you will need a clearance from your Cardiologist per Anesthesia.      ONLY for patients requiring bowel prep, written instructions will be given by your doctor's office.  ONLY if you have a neuro stimulator, please bring the controller with you the morning of surgery  ONLY if a type and screen test is needed before surgery, please return:  If your doctor has scheduled you for an overnight stay, bring a small overnight bag with any personal items you need.  Make arrangements in advance for transportation home by a responsible adult.  It is not safe to drive a vehicle during the 24 hours after anesthesia.          All  facilities and properties are tobacco free.  Smoking is NOT allowed.   If you have any questions about these instructions, call Pre-Op Admit  Nursing at [phone] or the Pre-Op Day Surgery Unit at [phone].

## 2023-09-14 NOTE — PRE ADMISSION SCREENING
Patient Name: Kayla Piper  YOB: 1955   MRN: [id number]     [facility]   Basic Mobility Inpatient Short Form 6 Clicks         How much difficulty does the patient currently have  Unable  A Lot  A Little  None      1. Turning over in bed (including adjusting bedclothes, sheets and blankets)?     1 []    2 []    3 []    4 [x]        2. Sitting down on and standing up from a chair with arms (e.g., wheelchair, bedside commode, etc.)     1 []  2 []  3 []     4 [x]      3. Moving from lying on back to sitting on the side of the bed?     1 []  2 []  3 []    4 [x]    How much help from another person does the patient currently need  Total  A Lot  A Little  None      4. Moving to and from a bed to a chair (including a wheelchair)?    1 []  2 []  3 []    4 [x]      5. Need to walk in hospital room?    1 []  2 []  3 []    4 [x]      6. Climbing 3-5 steps with a railing?    1 []  2 []  3 []    4 [x]       Raw Score:      24            CMS 0-100% Score:     0       %   Standardized Score:    61.14           CMS Modifier:       [facility] AM-PAC   Basic Mobility Inpatient Short Form 6 Clicks Score Conversion Table*         *Use this form to convert -PAC Basic Mobility Inpatient Raw Scores.   [facility] Inpatient Basic Mobility Short Form Scoring Example   1. Add the number values associated with the response to each item. For example, items totals yield a Raw Score of 21.   2. Match the raw score to the t-Scale scores (t-Scale score = 50.25, SE = 4.69).   3. Find the associated CMS % (CMS % = 28.97%).   4. Locate the correct CMS Functional Modifier Code, or G Code (G code = CJ)     NOTE: Each -PAC Short Form has a separate conversion table. Make sure that you use the correct conversion table.       Instruction Manual - page 45 contains conversion table

## 2023-09-15 ENCOUNTER — TELEPHONE (OUTPATIENT)
Dept: FAMILY MEDICINE | Facility: CLINIC | Age: 68
End: 2023-09-15
Payer: COMMERCIAL

## 2023-09-15 NOTE — TELEPHONE ENCOUNTER
----- Message from Shazia Beckham sent at 9/15/2023 10:17 AM CDT -----  Contact: [employer]    Type: Needs Medical Advice      Who Called:  [employer]    Best Call Back Number:[phone]     Additional Information: Patient is calling to speak with nurse/MA regarding R/S appt. Requesting to be R/S for 09-25-23 if available due to building renovations.  Please call back and advise. Thanks

## 2023-09-19 DIAGNOSIS — M17.11 PRIMARY OSTEOARTHRITIS OF RIGHT KNEE: Primary | ICD-10-CM

## 2023-09-21 ENCOUNTER — TELEPHONE (OUTPATIENT)
Dept: ORTHOPEDICS | Facility: CLINIC | Age: 68
End: 2023-09-21
Payer: COMMERCIAL

## 2023-09-21 NOTE — TELEPHONE ENCOUNTER
----- Message from Rosette Tai LPN sent at 9/21/2023  1:12 PM CDT -----  Regarding: FW: Angelica is looking for paperwork, call Shivani   Contact: Shivani   Never received any for this pt  ----- Message -----  From: Jevon López  Sent: 9/21/2023  12:33 PM CDT  To: [facility] Staff  Subject: Angelica is looking for paperwork, call Shivani#    Angelica is looking for paperwork, call Shivani

## 2023-09-21 NOTE — TELEPHONE ENCOUNTER
Called and left SHON for Shivani. No paperwork here at office. Requested her to fax to the office for our review.     Asa

## 2023-09-25 ENCOUNTER — OFFICE VISIT (OUTPATIENT)
Dept: FAMILY MEDICINE | Facility: CLINIC | Age: 68
End: 2023-09-25
Payer: COMMERCIAL

## 2023-09-25 ENCOUNTER — ANESTHESIA EVENT (OUTPATIENT)
Dept: SURGERY | Facility: HOSPITAL | Age: 68
End: 2023-09-25
Payer: COMMERCIAL

## 2023-09-25 VITALS
BODY MASS INDEX: 27.44 KG/M2 | OXYGEN SATURATION: 99 % | WEIGHT: 164.69 LBS | HEIGHT: 65 IN | SYSTOLIC BLOOD PRESSURE: 146 MMHG | HEART RATE: 64 BPM | DIASTOLIC BLOOD PRESSURE: 78 MMHG

## 2023-09-25 DIAGNOSIS — I10 WHITE COAT SYNDROME WITH DIAGNOSIS OF HYPERTENSION: ICD-10-CM

## 2023-09-25 DIAGNOSIS — Z79.899 LONG-TERM CURRENT USE OF BENZODIAZEPINE: Primary | ICD-10-CM

## 2023-09-25 DIAGNOSIS — M17.11 PRIMARY OSTEOARTHRITIS OF RIGHT KNEE: Primary | ICD-10-CM

## 2023-09-25 DIAGNOSIS — G47.00 INSOMNIA, UNSPECIFIED TYPE: ICD-10-CM

## 2023-09-25 LAB
AMP AMPHETAMINE 1000 NM/ML POC: NEGATIVE
BAR BARBITURATES 300 NG/ML POC: NEGATIVE
BUP BUPRENORPHINE 10 NG/ML POC: NEGATIVE
BZO BENZODIAZEPINES 300 NG/ML POC: ABNORMAL
COC COCAINE 300 NG/ML POC: NEGATIVE
CREATININE (CR) POC: 20
CTP QC/QA: YES
MET METHAMPHETAMINE 1000 NG/ML POC: NEGATIVE
MOP/OPI300 MORPHINE 300 NG/ML POC: NEGATIVE
MTD METHADONE 300 NG/ML POC: NEGATIVE
OXIDANT (OX) POC: NEGATIVE
OXY OXYCODONE 100 NG/ML POC: NEGATIVE
SPECIFIC GRAVITY (SG) POC: 1.02
TEMPERATURE (°F) POC: 92
THC MARIJUANA 50 NG/ML POC: NEGATIVE

## 2023-09-25 PROCEDURE — 99213 PR OFFICE/OUTPT VISIT, EST, LEVL III, 20-29 MIN: ICD-10-PCS | Mod: S$GLB,,, | Performed by: NURSE PRACTITIONER

## 2023-09-25 PROCEDURE — 99213 OFFICE O/P EST LOW 20 MIN: CPT | Mod: S$GLB,,, | Performed by: NURSE PRACTITIONER

## 2023-09-25 PROCEDURE — 99999 PR PBB SHADOW E&M-EST. PATIENT-LVL IV: CPT | Mod: PBBFAC,,, | Performed by: NURSE PRACTITIONER

## 2023-09-25 PROCEDURE — 80305 POCT URINE DRUG SCREEN (WITH BUP): ICD-10-PCS | Mod: QW,S$GLB,, | Performed by: NURSE PRACTITIONER

## 2023-09-25 PROCEDURE — 80305 DRUG TEST PRSMV DIR OPT OBS: CPT | Mod: QW,S$GLB,, | Performed by: NURSE PRACTITIONER

## 2023-09-25 PROCEDURE — 99999 PR PBB SHADOW E&M-EST. PATIENT-LVL IV: ICD-10-PCS | Mod: PBBFAC,,, | Performed by: NURSE PRACTITIONER

## 2023-09-25 RX ORDER — ACETAMINOPHEN 500 MG
1000 TABLET ORAL EVERY 8 HOURS PRN
Qty: 30 TABLET | Refills: 0 | Status: SHIPPED | OUTPATIENT
Start: 2023-09-25

## 2023-09-25 RX ORDER — OXYCODONE AND ACETAMINOPHEN 5; 325 MG/1; MG/1
1 TABLET ORAL EVERY 6 HOURS PRN
Qty: 28 TABLET | Refills: 0 | Status: SHIPPED | OUTPATIENT
Start: 2023-09-25 | End: 2024-01-16

## 2023-09-25 RX ORDER — ASPIRIN 81 MG/1
81 TABLET ORAL 2 TIMES DAILY
Qty: 56 TABLET | Refills: 0 | Status: SHIPPED | OUTPATIENT
Start: 2023-09-25 | End: 2023-10-09

## 2023-09-25 RX ORDER — TEMAZEPAM 30 MG/1
30 CAPSULE ORAL NIGHTLY PRN
Qty: 30 CAPSULE | Refills: 3 | Status: SHIPPED | OUTPATIENT
Start: 2023-09-25 | End: 2024-01-16 | Stop reason: SDUPTHER

## 2023-09-25 RX ORDER — ONDANSETRON 4 MG/1
4 TABLET, ORALLY DISINTEGRATING ORAL EVERY 6 HOURS PRN
Qty: 30 TABLET | Refills: 0 | Status: SHIPPED | OUTPATIENT
Start: 2023-09-25

## 2023-09-25 RX ORDER — CYCLOBENZAPRINE HCL 10 MG
10 TABLET ORAL 3 TIMES DAILY PRN
Qty: 30 TABLET | Refills: 0 | Status: SHIPPED | OUTPATIENT
Start: 2023-09-25 | End: 2023-10-05

## 2023-09-25 RX ORDER — IBUPROFEN 600 MG/1
600 TABLET ORAL 3 TIMES DAILY PRN
Qty: 30 TABLET | Refills: 0 | Status: SHIPPED | OUTPATIENT
Start: 2023-09-25

## 2023-09-25 NOTE — PROGRESS NOTES
Subjective:       Patient ID: Kayla Piper is a 68 y.o. female.    Chief Complaint: Follow-up       Kayla Piper presents to the clinic today for medication refills on her Temazepam that she uses for insomnia.   She has been under the care of Dr. Jimenez, Vascular - f/u yearly for history of Right Carotid Bruit, Gastroenterology Dr. Herrno- colonoscopy 2013- reports that it was normal.    Patient Active Problem List  Diagnosis  ·           Rhinitis  ·           Cervical disc displacement  ·           Cervical radiculopathy  ·           Chronic hoarseness  ·           ETD (eustachian tube dysfunction)  ·           Laryngopharyngeal reflux (LPR)  ·           Depression  ·           Hypertension  ·           Insomnia  ·           Gastroesophageal reflux disease without esophagitis  ·           Carotid stenosis, right  She is in need of her routine maintenance medications. She is on Temazepam for history of insomnia.  reviewed and is consistent with patient's history without any discrepancies/early fills.      Denies any new issues,concerns or complaints at today's visit     She is pending Right Robotic total knee arthroplasty with Dr. Kearney tomorrow       Review of Systems    Patient Active Problem List   Diagnosis    Rhinitis    Cervical disc displacement    Cervical radiculopathy    Chronic hoarseness    ETD (eustachian tube dysfunction)    Laryngopharyngeal reflux (LPR)    Depression    Hypertension    Insomnia    Gastroesophageal reflux disease without esophagitis    Carotid stenosis, right       Objective:      Physical Exam  Constitutional:       General: She is not in acute distress.     Appearance: Normal appearance.   Cardiovascular:      Rate and Rhythm: Normal rate and regular rhythm.      Heart sounds: Normal heart sounds.   Pulmonary:      Effort: Pulmonary effort is normal. No respiratory distress.   Neurological:      Mental Status: She is alert and oriented to person,  "place, and time.   Psychiatric:         Mood and Affect: Mood normal.         Behavior: Behavior normal.         Lab Results   Component Value Date    WBC 8.00 09/14/2023    HGB 12.3 09/14/2023    HCT 38.2 09/14/2023     09/14/2023    CHOL 136 07/05/2022    TRIG 69 07/05/2022    HDL 47 07/05/2022    ALT 17 07/25/2023    AST 24 07/25/2023     09/14/2023    K 3.8 09/14/2023     09/14/2023    CREATININE 0.9 09/14/2023    BUN 21 09/14/2023    CO2 25 09/14/2023    HGBA1C 5.7 (H) 07/05/2022     The 10-year ASCVD risk score (Omar GEIGER, et al., 2019) is: 9.4%    Values used to calculate the score:      Age: 68 years      Sex: Female      Is Non- : No      Diabetic: No      Tobacco smoker: No      Systolic Blood Pressure: 146 mmHg      Is BP treated: Yes      HDL Cholesterol: 47 mg/dL      Total Cholesterol: 136 mg/dL  Visit Vitals  BP (!) 146/78 (BP Location: Left arm, Patient Position: Sitting, BP Method: Medium (Manual))   Pulse 64   Ht 5' 5" (1.651 m)   Wt 74.7 kg (164 lb 11.2 oz)   SpO2 99%   BMI 27.41 kg/m²      Assessment:       1. Long-term current use of benzodiazepine    2. Insomnia, unspecified type        Plan:       1. Long-term current use of benzodiazepine  -     POCT Urine Drug Screen (With BUP)  UDS WNL   reviewed without discrepancies  CMA on file   2. Insomnia, unspecified type  -     temazepam (RESTORIL) 30 mg capsule; Take 1 capsule (30 mg total) by mouth nightly as needed for Insomnia.  Dispense: 30 capsule; Refill: 3  Continue current medication regimen as prescribed   3. White Coat Syndrome with diagnosis of Hypertension  Elevated BP in office today, pending knee surgery in the morning, currently awaiting phone call with arrival time, anxious   Continue current medication regimen- focus on dietary/lifestyle modifications  The patient was counseled on HTN education, management and recommendations. The need for weight reduction was reinforced and a BMI goal " of 19 to 25 was set.  Patient was encouraged to adhere to a low sodium diet and a DASH diet was recommended. Patient was also encouraged to engage in routine exercise such as walking most days of the week greater than 30 minutes. Patient education materials were provided to the patient for home review and further reinforcement of topics discussed.     Please monitor your blood pressure twice a day.  Make sure you have not had any caffeine or tobacco with in 45 minutes of checking your blood pressure.  Keep a log to bring to your next office visit.        Follow up in about 3 months (around 12/25/2023).      Future Appointments       Date Provider Specialty Appt Notes    10/9/2023 Mao Kearney MD Orthopedics 2 week post op    12/19/2023 Janet Aggarwal NP Family Medicine 3 month follow up med refill, UDS

## 2023-09-26 ENCOUNTER — ANESTHESIA (OUTPATIENT)
Dept: SURGERY | Facility: HOSPITAL | Age: 68
End: 2023-09-26
Payer: COMMERCIAL

## 2023-09-26 ENCOUNTER — HOSPITAL ENCOUNTER (OUTPATIENT)
Facility: HOSPITAL | Age: 68
Discharge: HOME | End: 2023-09-26
Attending: ORTHOPAEDIC SURGERY | Admitting: ORTHOPAEDIC SURGERY
Payer: COMMERCIAL

## 2023-09-26 DIAGNOSIS — M17.11 PRIMARY OSTEOARTHRITIS OF RIGHT KNEE: ICD-10-CM

## 2023-09-26 PROCEDURE — 63600175 PHARM REV CODE 636 W HCPCS: Performed by: ORTHOPAEDIC SURGERY

## 2023-09-26 PROCEDURE — 37000009 HC ANESTHESIA EA ADD 15 MINS: Performed by: ORTHOPAEDIC SURGERY

## 2023-09-26 PROCEDURE — 25000003 PHARM REV CODE 250: Performed by: ANESTHESIOLOGY

## 2023-09-26 PROCEDURE — D9220A PRA ANESTHESIA: Mod: CRNA,,, | Performed by: NURSE ANESTHETIST, CERTIFIED REGISTERED

## 2023-09-26 PROCEDURE — 71000016 HC POSTOP RECOV ADDL HR: Performed by: ORTHOPAEDIC SURGERY

## 2023-09-26 PROCEDURE — 97110 THERAPEUTIC EXERCISES: CPT

## 2023-09-26 PROCEDURE — 27447 TOTAL KNEE ARTHROPLASTY: CPT | Mod: RT,,, | Performed by: ORTHOPAEDIC SURGERY

## 2023-09-26 PROCEDURE — 64447 RIGHT ADDUCTOR CANAL: ICD-10-PCS | Mod: 59,RT,, | Performed by: ANESTHESIOLOGY

## 2023-09-26 PROCEDURE — 37000008 HC ANESTHESIA 1ST 15 MINUTES: Performed by: ORTHOPAEDIC SURGERY

## 2023-09-26 PROCEDURE — 36000713 HC OR TIME LEV V EA ADD 15 MIN: Performed by: ORTHOPAEDIC SURGERY

## 2023-09-26 PROCEDURE — 94799 UNLISTED PULMONARY SVC/PX: CPT

## 2023-09-26 PROCEDURE — C9290 INJ, BUPIVACAINE LIPOSOME: HCPCS | Performed by: ANESTHESIOLOGY

## 2023-09-26 PROCEDURE — 71000033 HC RECOVERY, INTIAL HOUR: Performed by: ORTHOPAEDIC SURGERY

## 2023-09-26 PROCEDURE — 64447 NJX AA&/STRD FEMORAL NRV IMG: CPT | Mod: 59 | Performed by: ANESTHESIOLOGY

## 2023-09-26 PROCEDURE — 63600175 PHARM REV CODE 636 W HCPCS

## 2023-09-26 PROCEDURE — 36000712 HC OR TIME LEV V 1ST 15 MIN: Performed by: ORTHOPAEDIC SURGERY

## 2023-09-26 PROCEDURE — 25000003 PHARM REV CODE 250: Performed by: NURSE ANESTHETIST, CERTIFIED REGISTERED

## 2023-09-26 PROCEDURE — D9220A PRA ANESTHESIA: ICD-10-PCS | Mod: CRNA,,, | Performed by: NURSE ANESTHETIST, CERTIFIED REGISTERED

## 2023-09-26 PROCEDURE — 25000003 PHARM REV CODE 250: Performed by: ORTHOPAEDIC SURGERY

## 2023-09-26 PROCEDURE — 63600175 PHARM REV CODE 636 W HCPCS: Performed by: ANESTHESIOLOGY

## 2023-09-26 PROCEDURE — C1776 JOINT DEVICE (IMPLANTABLE): HCPCS | Performed by: ORTHOPAEDIC SURGERY

## 2023-09-26 PROCEDURE — 63600175 PHARM REV CODE 636 W HCPCS: Performed by: NURSE ANESTHETIST, CERTIFIED REGISTERED

## 2023-09-26 PROCEDURE — D9220A PRA ANESTHESIA: Mod: ANES,,, | Performed by: ANESTHESIOLOGY

## 2023-09-26 PROCEDURE — 64447 NJX AA&/STRD FEMORAL NRV IMG: CPT | Mod: 59,RT,, | Performed by: ANESTHESIOLOGY

## 2023-09-26 PROCEDURE — 27201423 OPTIME MED/SURG SUP & DEVICES STERILE SUPPLY: Performed by: ORTHOPAEDIC SURGERY

## 2023-09-26 PROCEDURE — 0055T PR COMPUTER-ASSIST MUSCSKEL NAVIG, ORTHO PROC, CT/MRI: ICD-10-PCS | Mod: ,,, | Performed by: ORTHOPAEDIC SURGERY

## 2023-09-26 PROCEDURE — 97116 GAIT TRAINING THERAPY: CPT

## 2023-09-26 PROCEDURE — 27200688 HC TRAY, SPINAL-HYPER/ ISOBARIC: Performed by: ANESTHESIOLOGY

## 2023-09-26 PROCEDURE — 97161 PT EVAL LOW COMPLEX 20 MIN: CPT

## 2023-09-26 PROCEDURE — 71000015 HC POSTOP RECOV 1ST HR: Performed by: ORTHOPAEDIC SURGERY

## 2023-09-26 PROCEDURE — D9220A PRA ANESTHESIA: ICD-10-PCS | Mod: ANES,,, | Performed by: ANESTHESIOLOGY

## 2023-09-26 PROCEDURE — 27447 PR TOTAL KNEE ARTHROPLASTY: ICD-10-PCS | Mod: RT,,, | Performed by: ORTHOPAEDIC SURGERY

## 2023-09-26 PROCEDURE — 0055T BONE SRGRY CMPTR CT/MRI IMAG: CPT | Mod: ,,, | Performed by: ORTHOPAEDIC SURGERY

## 2023-09-26 PROCEDURE — 27200665 HC NERVE BLOCK NEEDLE/ CATHETER: Performed by: ANESTHESIOLOGY

## 2023-09-26 PROCEDURE — 71000039 HC RECOVERY, EACH ADD'L HOUR: Performed by: ORTHOPAEDIC SURGERY

## 2023-09-26 PROCEDURE — C1713 ANCHOR/SCREW BN/BN,TIS/BN: HCPCS | Performed by: ORTHOPAEDIC SURGERY

## 2023-09-26 DEVICE — PRIMARY TIBIAL BASEPLATE
Type: IMPLANTABLE DEVICE | Site: KNEE | Status: FUNCTIONAL
Brand: TRIATHLON

## 2023-09-26 DEVICE — PATELLA
Type: IMPLANTABLE DEVICE | Site: KNEE | Status: FUNCTIONAL
Brand: TRIATHLON

## 2023-09-26 DEVICE — CEMENT BONE ANTIBIO SIMPLEX P: Type: IMPLANTABLE DEVICE | Site: KNEE | Status: FUNCTIONAL

## 2023-09-26 DEVICE — TIBIAL BEARING INSERT - CS
Type: IMPLANTABLE DEVICE | Site: KNEE | Status: FUNCTIONAL
Brand: TRIATHLON

## 2023-09-26 DEVICE — CRUCIATE RETAINING FEMORAL
Type: IMPLANTABLE DEVICE | Site: KNEE | Status: FUNCTIONAL
Brand: TRIATHLON

## 2023-09-26 RX ORDER — ASPIRIN 325 MG
325 TABLET ORAL DAILY
COMMUNITY

## 2023-09-26 RX ORDER — SODIUM CHLORIDE, SODIUM LACTATE, POTASSIUM CHLORIDE, CALCIUM CHLORIDE 600; 310; 30; 20 MG/100ML; MG/100ML; MG/100ML; MG/100ML
INJECTION, SOLUTION INTRAVENOUS CONTINUOUS
Status: CANCELLED | OUTPATIENT
Start: 2023-09-26

## 2023-09-26 RX ORDER — CEFAZOLIN SODIUM 2 G/50ML
2 SOLUTION INTRAVENOUS
Status: COMPLETED | OUTPATIENT
Start: 2023-09-26 | End: 2023-09-26

## 2023-09-26 RX ORDER — FENTANYL CITRATE 50 UG/ML
25 INJECTION, SOLUTION INTRAMUSCULAR; INTRAVENOUS EVERY 5 MIN PRN
Status: DISCONTINUED | OUTPATIENT
Start: 2023-09-26 | End: 2024-01-16

## 2023-09-26 RX ORDER — FENTANYL CITRATE 50 UG/ML
INJECTION, SOLUTION INTRAMUSCULAR; INTRAVENOUS
Status: DISCONTINUED | OUTPATIENT
Start: 2023-09-26 | End: 2023-09-26

## 2023-09-26 RX ORDER — ACETAMINOPHEN 10 MG/ML
INJECTION, SOLUTION INTRAVENOUS
Status: DISCONTINUED | OUTPATIENT
Start: 2023-09-26 | End: 2023-09-26

## 2023-09-26 RX ORDER — HYDROMORPHONE HYDROCHLORIDE 2 MG/ML
0.2 INJECTION, SOLUTION INTRAMUSCULAR; INTRAVENOUS; SUBCUTANEOUS EVERY 5 MIN PRN
Status: DISCONTINUED | OUTPATIENT
Start: 2023-09-26 | End: 2024-01-16

## 2023-09-26 RX ORDER — CELECOXIB 100 MG/1
400 CAPSULE ORAL ONCE
Status: COMPLETED | OUTPATIENT
Start: 2023-09-26 | End: 2023-09-26

## 2023-09-26 RX ORDER — NAPROXEN SODIUM 220 MG/1
81 TABLET, FILM COATED ORAL 2 TIMES DAILY
Status: DISCONTINUED | OUTPATIENT
Start: 2023-09-26 | End: 2023-09-26 | Stop reason: HOSPADM

## 2023-09-26 RX ORDER — ONDANSETRON HYDROCHLORIDE 2 MG/ML
INJECTION, SOLUTION INTRAMUSCULAR; INTRAVENOUS
Status: DISCONTINUED | OUTPATIENT
Start: 2023-09-26 | End: 2023-09-26

## 2023-09-26 RX ORDER — DEXAMETHASONE SODIUM PHOSPHATE 4 MG/ML
INJECTION, SOLUTION INTRA-ARTICULAR; INTRALESIONAL; INTRAMUSCULAR; INTRAVENOUS; SOFT TISSUE
Status: DISCONTINUED | OUTPATIENT
Start: 2023-09-26 | End: 2023-09-26

## 2023-09-26 RX ORDER — OXYCODONE HCL 10 MG/1
10 TABLET, FILM COATED, EXTENDED RELEASE ORAL ONCE
Status: COMPLETED | OUTPATIENT
Start: 2023-09-26 | End: 2023-09-26

## 2023-09-26 RX ORDER — PREGABALIN 75 MG/1
75 CAPSULE ORAL ONCE
Status: COMPLETED | OUTPATIENT
Start: 2023-09-26 | End: 2023-09-26

## 2023-09-26 RX ORDER — LIDOCAINE HYDROCHLORIDE 10 MG/ML
1 INJECTION, SOLUTION EPIDURAL; INFILTRATION; INTRACAUDAL; PERINEURAL ONCE
Status: DISCONTINUED | OUTPATIENT
Start: 2023-09-26 | End: 2024-01-16

## 2023-09-26 RX ORDER — PROPOFOL 10 MG/ML
VIAL (ML) INTRAVENOUS
Status: DISCONTINUED | OUTPATIENT
Start: 2023-09-26 | End: 2023-09-26

## 2023-09-26 RX ORDER — PROPOFOL 10 MG/ML
VIAL (ML) INTRAVENOUS CONTINUOUS PRN
Status: DISCONTINUED | OUTPATIENT
Start: 2023-09-26 | End: 2023-09-26

## 2023-09-26 RX ORDER — PHENYLEPHRINE HYDROCHLORIDE 10 MG/ML
INJECTION INTRAVENOUS
Status: DISCONTINUED | OUTPATIENT
Start: 2023-09-26 | End: 2023-09-26

## 2023-09-26 RX ORDER — MIDAZOLAM HYDROCHLORIDE 1 MG/ML
INJECTION INTRAMUSCULAR; INTRAVENOUS
Status: DISCONTINUED | OUTPATIENT
Start: 2023-09-26 | End: 2023-09-26

## 2023-09-26 RX ORDER — BUPIVACAINE HYDROCHLORIDE 7.5 MG/ML
INJECTION, SOLUTION EPIDURAL; RETROBULBAR
Status: DISCONTINUED | OUTPATIENT
Start: 2023-09-26 | End: 2023-09-26

## 2023-09-26 RX ORDER — SCOLOPAMINE TRANSDERMAL SYSTEM 1 MG/1
1 PATCH, EXTENDED RELEASE TRANSDERMAL
Status: DISCONTINUED | OUTPATIENT
Start: 2023-09-26 | End: 2023-09-26 | Stop reason: HOSPADM

## 2023-09-26 RX ORDER — BUPIVACAINE HYDROCHLORIDE 5 MG/ML
INJECTION, SOLUTION EPIDURAL; INTRACAUDAL
Status: DISCONTINUED | OUTPATIENT
Start: 2023-09-26 | End: 2023-09-26

## 2023-09-26 RX ORDER — SODIUM CHLORIDE, SODIUM LACTATE, POTASSIUM CHLORIDE, CALCIUM CHLORIDE 600; 310; 30; 20 MG/100ML; MG/100ML; MG/100ML; MG/100ML
INJECTION, SOLUTION INTRAVENOUS CONTINUOUS
Status: DISCONTINUED | OUTPATIENT
Start: 2023-09-26 | End: 2024-01-16

## 2023-09-26 RX ORDER — OMEPRAZOLE 10 MG/1
10 CAPSULE, DELAYED RELEASE ORAL DAILY
Status: ON HOLD | COMMUNITY
End: 2023-10-23 | Stop reason: SDUPTHER

## 2023-09-26 RX ORDER — OXYCODONE HYDROCHLORIDE 5 MG/1
5 TABLET ORAL
Status: DISCONTINUED | OUTPATIENT
Start: 2023-09-26 | End: 2024-01-16

## 2023-09-26 RX ORDER — LIDOCAINE HYDROCHLORIDE 20 MG/ML
INJECTION INTRAVENOUS
Status: DISCONTINUED | OUTPATIENT
Start: 2023-09-26 | End: 2023-09-26

## 2023-09-26 RX ORDER — TRANEXAMIC ACID 100 MG/ML
INJECTION, SOLUTION INTRAVENOUS
Status: DISCONTINUED | OUTPATIENT
Start: 2023-09-26 | End: 2023-09-26

## 2023-09-26 RX ORDER — MIDAZOLAM HYDROCHLORIDE 1 MG/ML
0.5 INJECTION INTRAMUSCULAR; INTRAVENOUS
Status: DISCONTINUED | OUTPATIENT
Start: 2023-09-26 | End: 2024-01-16

## 2023-09-26 RX ORDER — MUPIROCIN 20 MG/G
OINTMENT TOPICAL
Status: DISCONTINUED | OUTPATIENT
Start: 2023-09-26 | End: 2023-09-26 | Stop reason: HOSPADM

## 2023-09-26 RX ADMIN — PHENYLEPHRINE HYDROCHLORIDE 200 MCG: 10 INJECTION INTRAVENOUS at 11:09

## 2023-09-26 RX ADMIN — PHENYLEPHRINE HYDROCHLORIDE 200 MCG: 10 INJECTION INTRAVENOUS at 10:09

## 2023-09-26 RX ADMIN — PHENYLEPHRINE HYDROCHLORIDE 100 MCG: 10 INJECTION INTRAVENOUS at 11:09

## 2023-09-26 RX ADMIN — MIDAZOLAM HYDROCHLORIDE 2 MG: 1 INJECTION, SOLUTION INTRAMUSCULAR; INTRAVENOUS at 09:09

## 2023-09-26 RX ADMIN — FENTANYL CITRATE 50 MCG: 50 INJECTION, SOLUTION INTRAMUSCULAR; INTRAVENOUS at 10:09

## 2023-09-26 RX ADMIN — ROPIVACAINE HYDROCHLORIDE: 5 INJECTION, SOLUTION EPIDURAL; INFILTRATION; PERINEURAL at 10:09

## 2023-09-26 RX ADMIN — SODIUM CHLORIDE, SODIUM GLUCONATE, SODIUM ACETATE, POTASSIUM CHLORIDE, MAGNESIUM CHLORIDE, SODIUM PHOSPHATE, DIBASIC, AND POTASSIUM PHOSPHATE: .53; .5; .37; .037; .03; .012; .00082 INJECTION, SOLUTION INTRAVENOUS at 10:09

## 2023-09-26 RX ADMIN — SODIUM CHLORIDE, SODIUM GLUCONATE, SODIUM ACETATE, POTASSIUM CHLORIDE, MAGNESIUM CHLORIDE, SODIUM PHOSPHATE, DIBASIC, AND POTASSIUM PHOSPHATE: .53; .5; .37; .037; .03; .012; .00082 INJECTION, SOLUTION INTRAVENOUS at 11:09

## 2023-09-26 RX ADMIN — CEFAZOLIN SODIUM 2 G: 2 SOLUTION INTRAVENOUS at 10:09

## 2023-09-26 RX ADMIN — ACETAMINOPHEN 1000 MG: 10 INJECTION, SOLUTION INTRAVENOUS at 10:09

## 2023-09-26 RX ADMIN — SCOLOPAMINE TRANSDERMAL SYSTEM 1 PATCH: 1 PATCH, EXTENDED RELEASE TRANSDERMAL at 08:09

## 2023-09-26 RX ADMIN — PHENYLEPHRINE HYDROCHLORIDE 100 MCG: 10 INJECTION INTRAVENOUS at 10:09

## 2023-09-26 RX ADMIN — TRANEXAMIC ACID 800 MG: 100 INJECTION, SOLUTION INTRAVENOUS at 10:09

## 2023-09-26 RX ADMIN — BUPIVACAINE HYDROCHLORIDE 1.6 ML: 7.5 INJECTION, SOLUTION EPIDURAL; RETROBULBAR at 10:09

## 2023-09-26 RX ADMIN — DEXAMETHASONE SODIUM PHOSPHATE 8 MG: 4 INJECTION, SOLUTION INTRA-ARTICULAR; INTRALESIONAL; INTRAMUSCULAR; INTRAVENOUS; SOFT TISSUE at 10:09

## 2023-09-26 RX ADMIN — ONDANSETRON 4 MG: 2 INJECTION INTRAMUSCULAR; INTRAVENOUS at 10:09

## 2023-09-26 RX ADMIN — PROPOFOL 75 MCG/KG/MIN: 10 INJECTION, EMULSION INTRAVENOUS at 10:09

## 2023-09-26 RX ADMIN — SODIUM CHLORIDE, SODIUM GLUCONATE, SODIUM ACETATE, POTASSIUM CHLORIDE, MAGNESIUM CHLORIDE, SODIUM PHOSPHATE, DIBASIC, AND POTASSIUM PHOSPHATE: .53; .5; .37; .037; .03; .012; .00082 INJECTION, SOLUTION INTRAVENOUS at 09:09

## 2023-09-26 RX ADMIN — BUPIVACAINE 20 ML: 13.3 INJECTION, SUSPENSION, LIPOSOMAL INFILTRATION at 09:09

## 2023-09-26 RX ADMIN — MUPIROCIN: 20 OINTMENT TOPICAL at 08:09

## 2023-09-26 RX ADMIN — LIDOCAINE HYDROCHLORIDE 50 MG: 20 INJECTION, SOLUTION INTRAVENOUS at 10:09

## 2023-09-26 RX ADMIN — PROPOFOL 20 MG: 10 INJECTION, EMULSION INTRAVENOUS at 10:09

## 2023-09-26 RX ADMIN — CELECOXIB 400 MG: 100 CAPSULE ORAL at 08:09

## 2023-09-26 RX ADMIN — FENTANYL CITRATE 50 MCG: 50 INJECTION, SOLUTION INTRAMUSCULAR; INTRAVENOUS at 09:09

## 2023-09-26 RX ADMIN — PREGABALIN 75 MG: 75 CAPSULE ORAL at 07:09

## 2023-09-26 RX ADMIN — OXYCODONE HYDROCHLORIDE 5 MG: 5 TABLET ORAL at 12:09

## 2023-09-26 RX ADMIN — OXYCODONE HYDROCHLORIDE 10 MG: 10 TABLET, FILM COATED, EXTENDED RELEASE ORAL at 08:09

## 2023-09-26 RX ADMIN — BUPIVACAINE HYDROCHLORIDE 10 ML: 5 INJECTION, SOLUTION EPIDURAL; INTRACAUDAL; PERINEURAL at 09:09

## 2023-09-26 NOTE — PLAN OF CARE
Patient received from recovery at this time.  AAOX3.  NAD noted.  Dressing to right knee remains clean, dry and intact. Tolerating po intake well with no complaints of nausea/vomiting.  Patient able to move BLE with no problems noted.  Due to void.

## 2023-09-26 NOTE — DISCHARGE SUMMARY
[facility]  Discharge Note  Short Stay    Procedure(s) (LRB):  ROBOTIC ARTHROPLASTY, KNEE, TOTAL (Right)      OUTCOME: Patient tolerated treatment/procedure well without complication and is now ready for discharge.    DISPOSITION: Home or Self Care    FINAL DIAGNOSIS:  <principal problem not specified>    FOLLOWUP: In clinic    DISCHARGE INSTRUCTIONS:    Discharge Procedure Orders   Diet general     Leave dressing on - Keep it clean, dry, and intact until clinic visit     Change dressing (specify)   Order Comments: Dressing change: If dressing loses its seal, change daily.     Call MD for:  temperature >100.4     Call MD for:  persistent nausea and vomiting     Call MD for:  severe uncontrolled pain     Call MD for:  difficulty breathing, headache or visual disturbances     Call MD for:  redness, tenderness, or signs of infection (pain, swelling, redness, odor or green/yellow discharge around incision site)     Call MD for:  hives     Call MD for:  persistent dizziness or light-headedness     Call MD for:  extreme fatigue     Keep surgical extremity elevated     Ice to affected area   Order Comments: using barrier between ice and skin (specify duration&frequency)     Activity as tolerated     Shower on day dressing removed (No bath)     Weight bearing as tolerated        TIME SPENT ON DISCHARGE: 5 minutes

## 2023-09-26 NOTE — PT/OT/SLP EVAL
Physical Therapy Evaluation and Discharge Note    Patient Name:  Kayla Piper   MRN:  [id number]    Recommendations:     Discharge Recommendations: home, home health PT  Discharge Equipment Recommendations: none   Barriers to discharge: None    Assessment:     Kayla Piper is a 68 y.o. female admitted with a medical diagnosis of  right TKA.  At this time, patient is scheduled for discharge home today and appears will be safe with mobility in the home. Patient would benefit though from continued PT for ROM and strengthening with HHPT to further improve safety with mobility.        Recent Surgery: Procedure(s) (LRB):  ROBOTIC ARTHROPLASTY, KNEE, TOTAL (Right) Day of Surgery    Plan:     During this hospitalization, patient does not require further acute PT services.  Please re-consult if situation changes.      Subjective     Chief Complaint: pain and numbness in right LE  Patient/Family Comments/goals: go home when ready  Pain/Comfort:  Pain Rating 1: 2/10  Location - Side 1: Right  Location - Orientation 1: lower  Location 1: knee  Pain Addressed 1: Reposition, Cessation of Activity  Pain Rating Post-Intervention 1: 6/10    Patients cultural, spiritual, [employer] conflicts given the current situation:      Living Environment:  Currently lives with spouse in a 2 story home but patient able to sleep downstairs until able to get up the stairs more easily.  Prior to admission, patients level of function was independent.  Equipment used at home: none.  DME owned (not currently used): rolling walker.  Upon discharge, patient will have assistance from family.    Objective:     Communicated with nurse prior to session.  Patient found supine with cryotherapy upon PT entry to room.    General Precautions: Standard, fall    Orthopedic Precautions:RLE weight bearing as tolerated   Braces:    Respiratory Status: Room air    Exams:  RLE ROM: Deficits: knee extension -10 degrees, flexion 90 degrees both taken  in sitting  RLE Strength: Deficits: 3-/5 overall  LLE ROM: WFL  LLE Strength: WNL    Functional Mobility:  Bed Mobility:     Supine to Sit: stand by assistance  Transfers:     Sit to Stand:  contact guard assistance with rolling walker  Gait: x 300 feet RW CGA    AM-PAC 6 CLICK MOBILITY  Total Score:20       Treatment and Education:  Exercise to include ankle pumps, quad sets, heel slides, hip abd and LAQ.  All done AAROM x 10 reps right LE.  Gait training x 300 feet RW cGA, x 250 feet, x 100 feet rW sBA, up/down 1 4 inch step RW cGA, up/down 5 steps left hand rail  x 5 steps right hand rail SBA    AM-PAC 6 CLICK MOBILITY  Total Score:20     Patient left up in chair with call button in reach, nurse notified, and family present.    GOALS:   Multidisciplinary Problems       Physical Therapy Goals       Not on file                    History:     Past Medical History:   Diagnosis Date    Carotid stenosis, right     DDD (degenerative disc disease), cervical     Depression     Hyperkalemia     Hypertension     Insomnia     Rhinitis     Synovial cyst of popliteal space        Past Surgical History:   Procedure Laterality Date    ANTERIOR CERVICAL DISCECTOMY W/ FUSION      APPENDECTOMY      CAROTID ENDARTERECTOMY Right 10/03/2018    COLONOSCOPY  2013    KNEE SURGERY Right     REPAIR MENISCUS SCOPE    TUBAL LIGATION  2000    UMBILICAL HERNIA REPAIR  12/14/2022       Time Tracking:     PT Received On: 09/26/23  PT Start Time: 1314     PT Stop Time: 1421  PT Total Time (min): 67 min     Billable Minutes: Evaluation 30, Gait Training 20, and Therapeutic Exercise 17      09/26/2023

## 2023-09-26 NOTE — DISCHARGE INSTRUCTIONS
"Discharge Instructions: After Your Surgery/Procedure  Youve just had surgery. During surgery you were given medicine called anesthesia to keep you relaxed and free of pain. After surgery you may have some pain or nausea. This is common. Here are some tips for feeling better and getting well after surgery.     Stay on schedule with your medication.   Going home  Your doctor or nurse will show you how to take care of yourself when you go home. He or she will also answer your questions. Have an adult family member or friend drive you home.      For your safety we recommend these precaution for the first 24 hours after your procedure:  Do not drive or use heavy equipment.  Do not make important decisions or sign legal papers.  Do not drink alcohol.  Have someone stay with you, if needed. He or she can watch for problems and help keep you safe.  Your concentration, balance, coordination, and judgement may be impaired for many hours after anesthesia.  Use caution when ambulating or standing up.     You may feel weak and "washed out" after anesthesia and surgery.      Subtle residual effects of general anesthesia or sedation with regional / local anesthesia can last more than 24 hours.  Rest for the remainder of the day or longer if your Doctor/Surgeon has advised you to do so.  Although you may feel normal within the first 24 hours, your reflexes and mental ability may be impaired without you realizing it.  You may feel dizzy, lightheaded or sleepy for 24 hours or longer.      Be sure to go to all follow-up visits with your doctor. And rest after your surgery for as long as your doctor tells you to.  Coping with pain  If you have pain after surgery, pain medicine will help you feel better. Take it as told, before pain becomes severe. Also, ask your doctor or pharmacist about other ways to control pain. This might be with heat, ice, or relaxation. And follow any other instructions your surgeon or nurse gives you.  Tips " for taking pain medicine  To get the best relief possible, remember these points:  Pain medicines can upset your stomach. Taking them with a little food may help.  Most pain relievers taken by mouth need at least 20 to 30 minutes to start to work.  Taking medicine on a schedule can help you remember to take it. Try to time your medicine so that you can take it before starting an activity. This might be before you get dressed, go for a walk, or sit down for dinner.  Constipation is a common side effect of pain medicines. Call your doctor before taking any medicines such as laxatives or stool softeners to help ease constipation. Also ask if you should skip any foods. Drinking lots of fluids and eating foods such as fruits and vegetables that are high in fiber can also help. Remember, do not take laxatives unless your surgeon has prescribed them.  Drinking alcohol and taking pain medicine can cause dizziness and slow your breathing. It can even be deadly. Do not drink alcohol while taking pain medicine.  Pain medicine can make you react more slowly to things. Do not drive or run machinery while taking pain medicine.  Your health care provider may tell you to take acetaminophen to help ease your pain. Ask him or her how much you are supposed to take each day. Acetaminophen or other pain relievers may interact with your prescription medicines or other over-the-counter (OTC) drugs. Some prescription medicines have acetaminophen and other ingredients. Using both prescription and OTC acetaminophen for pain can cause you to overdose. Read the labels on your OTC medicines with care. This will help you to clearly know the list of ingredients, how much to take, and any warnings. It may also help you not take too much acetaminophen. If you have questions or do not understand the information, ask your pharmacist or health care provider to explain it to you before you take the OTC medicine.  Managing nausea  Some people have an  upset stomach after surgery. This is often because of anesthesia, pain, or pain medicine, or the stress of surgery. These tips will help you handle nausea and eat healthy foods as you get better. If you were on a special food plan before surgery, ask your doctor if you should follow it while you get better. These tips may help:  Do not push yourself to eat. Your body will tell you when to eat and how much.  Start off with clear liquids and soup. They are easier to digest.  Next try semi-solid foods, such as mashed potatoes, applesauce, and gelatin, as you feel ready.  Slowly move to solid foods. Dont eat fatty, rich, or spicy foods at first.  Do not force yourself to have 3 large meals a day. Instead eat smaller amounts more often.  Take pain medicines with a small amount of solid food, such as crackers or toast, to avoid nausea.     Call your surgeon if  You still have pain an hour after taking medicine. The medicine may not be strong enough.  You feel too sleepy, dizzy, or groggy. The medicine may be too strong.  You have side effects like nausea, vomiting, or skin changes, such as rash, itching, or hives.       If you have obstructive sleep apnea  You were given anesthesia medicine during surgery to keep you comfortable and free of pain. After surgery, you may have more apnea spells because of this medicine and other medicines you were given. The spells may last longer than usual.   At home:  Keep using the continuous positive airway pressure (CPAP) device when you sleep. Unless your health care provider tells you not to, use it when you sleep, day or night. CPAP is a common device used to treat obstructive sleep apnea.  Talk with your provider before taking any pain medicine, muscle relaxants, or sedatives. Your provider will tell you about the possible dangers of taking these medicines.  © 9145-5353 [employer]. [address] [place] [address], [place] [address]. All rights reserved. This information is  not intended as a substitute for professional medical care. Always follow your healthcare professional's instructions.           Using an Incentive Spirometer    An incentive spirometer is a device that helps you do deep breathing exercises. These exercises expand your lungs, aid in circulation, and help prevent pneumonia. Deep breathing exercises also help you breathe better and improve the function of your lungs by:  Keeping your lungs clear  Strengthening your breathing muscles  Helping prevent respiratory complications or problems  The incentive spirometer gives you a way to take an active part in recover. A nurse or therapist will teach you breathing exercises. To do these exercises, you will breathe in through your mouth and not your nose. The incentive spirometer only works correctly if you breathe in through your mouth.  Steps to clear lungs  Step 1. Exhale normally. Then, inhale normally.  Relax and breathe out.  Step 2. Place your lips tightly around the mouthpiece.  Make sure the device is upright and not tilted.  Step 3. Inhale as much air as you can through the mouthpiece (don't breath through your nose).  Inhale slowly and deeply.  Hold your breath long enough to keep the balls or disk raised for at least 3 to 5 seconds, or as instructed by your healthcare provider.  Some spirometers have an indicator to let you know that you are breathing in too fast. If the indicator goes off, breathe in more slowly.  Step 4. Repeat the exercise regularly.  Do this exercise every hour while you're awake, or as instructed by your healthcare provider.  If you were taught deep breathing and coughing exercises, do them regularly as instructed by your healthcare provider.            Post op instructions for prevention of DVT  What is deep vein thrombosis?  Deep vein thrombosis (DVT) is the medical term for blood clots in the deep veins of the leg.  These blood clots can be dangerous.  A DVT can block a blood vessel and  keep blood from getting where it needs to go.  Another problem is that the clot can travel to other parts of the body such as the lungs.  A clot that travels to the lungs is called a pulmonary embolus (PE) and can cause serious problems with breathing which can lead to death.  Am I at risk for DVT/PE?  If you are not very active, you are at risk of DVT.  Anyone confined to bed, sitting for long periods of time, recovering from surgery, etc. increases the risk of DVT.  Other risk factors are cancer diagnosis, certain medications, estrogen replacement in any form,older age, obesity, pregnancy, smoking, history of clotting disorders, and dehydration.  How will I know if I have a DVT?  Swelling in the lower leg  Pain  Warmth, redness, hardness or bulging of the vein  If you have any of these symptoms, call your doctors office right away.  Some people will not have any symptoms until the clot moves to the lungs.  What are the symptoms of a PE?  Panting, shortness of breath, or trouble breathing  Sharp, knife-like chest pain when you breathe  Coughing or coughing up blood  Rapid heartbeat  If you have any of these symptoms or get worse quickly, call 911 for emergency treatment.  How can I prevent a DVT?  Avoid long periods of inactivity and dont cross your legs--get up and walk around every hour or so.  Stay active--walking after surgery is highly encouraged.  This means you should get out of the house and walk in the neighborhood.  Going up and down stairs will not impair healing (unless advised against such activity by your doctor).    Drink plenty of noncaffeinated, nonalcoholic fluids each day to prevent dehydration.  Wear special support stockings, if they have been advised by your doctor.  If you travel, stop at least once an hour and walk around.  Avoid smoking (assistance with stopping is available through your healthcare provider)  Always notify your doctor if you are not able to follow the post operative  instructions that are given to you at the time of discharge.  It may be necessary to prescribe one of the medications available to prevent DVT.           Exparel(bupivacaine) has been injected to provide approximately 72 hours of reduced pain after your surgery.  Do not remove the bracelet for five days.  Report to your doctor as soon as possible if you experience any of the following:   Restlessness   Anxiety   Speech problems    Lightheadedness   Numbness and tingling of the mouth and lips   Seizures    Metallic taste   Blurred vision   Tremors    Twitching   Depression   Extreme drowsiness  Avoid additional use of local anesthetics (such as dental procedures) for five days (96 hours).           We hope your stay was comfortable as you heal now, mend and rest.    For we have enjoyed taking care of you by giving your our best.    And as you get better, by regaining your health and strength;   We count it as a privilege to have served you and hope your time at [facility] was well spent.      Thank  You!!!

## 2023-09-26 NOTE — TRANSFER OF CARE
"Anesthesia Transfer of Care Note    Patient: Kayla Piper    Procedure(s) Performed: Procedure(s) (LRB):  ROBOTIC ARTHROPLASTY, KNEE, TOTAL (Right)    Patient location: PACU    Anesthesia Type: spinal    Transport from OR: Transported from OR on 2-3 L/min O2 by NC with adequate spontaneous ventilation    Post pain: adequate analgesia    Post assessment: no apparent anesthetic complications and tolerated procedure well    Post vital signs: stable    Level of consciousness: sedated and responds to stimulation    Nausea/Vomiting: no nausea/vomiting    Complications: none    Transfer of care protocol was followed      Last vitals:   Visit Vitals  BP (!) 106/56 (BP Location: Left arm, Patient Position: Lying)   Pulse 61   Temp 36.7 °C (98 °F) (Oral)   Resp 20   Ht 5' 5" (1.651 m)   Wt 74.7 kg (164 lb 10.9 oz)   SpO2 100%   Breastfeeding No   BMI 27.40 kg/m²     "

## 2023-09-26 NOTE — ANESTHESIA PREPROCEDURE EVALUATION
09/26/2023  Kayla Piper is a 68 y.o., female.      Pre-op Assessment    I have reviewed the Patient Summary Reports.     I have reviewed the Nursing Notes. I have reviewed the NPO Status.   I have reviewed the Medications.     Review of Systems  Anesthesia Hx:  No problems with previous Anesthesia    Social:  Former Smoker    Cardiovascular:   Hypertension    Pulmonary:  Pulmonary Normal    Renal/:  Renal/ Normal     Hepatic/GI:   GERD, well controlled    Musculoskeletal:   Arthritis   Spine Disorders: cervical Disc disease    Neurological:   Neuromuscular Disease,    Endocrine:  Endocrine Normal    Psych:   Psychiatric History depression          Physical Exam  General: Well nourished, Cooperative, Alert and Oriented    Airway:  Mallampati: II   Mouth Opening: Normal  TM Distance: Normal  Neck ROM: Normal ROM        Anesthesia Plan  Type of Anesthesia, risks & benefits discussed:    Anesthesia Type: Gen ETT, Gen Supraglottic Airway, Gen Natural Airway, MAC  Intra-op Monitoring Plan: Standard ASA Monitors  Post Op Pain Control Plan: multimodal analgesia  Induction:  IV  Airway Plan: Direct, Video and Fiberoptic, Post-Induction  Informed Consent: Informed consent signed with the Patient and all parties understand the risks and agree with anesthesia plan.  All questions answered.   ASA Score: 2    Ready For Surgery From Anesthesia Perspective.     .

## 2023-09-26 NOTE — OP NOTE
[facility]  Orthopedic Surgery  Operative Note    SUMMARY     Date of Procedure: 9/26/2023     Procedure: Procedure(s) (LRB):  ROBOTIC ARTHROPLASTY, KNEE, TOTAL (Right)       Surgeon(s) and Role:     * Mao Kearney MD - Primary    Assistant: Maldonado REYES    Pre-Operative Diagnosis: Primary osteoarthritis of right knee [M17.11]    Post-Operative Diagnosis: Post-Op Diagnosis Codes:     * Primary osteoarthritis of right knee [M17.11]    Anesthesia: Choice    Complications: No    Estimated Blood Loss (EBL): 25 mL           Implants:   Implant Name Type Inv. Item Serial No.  Lot No. LRB No. Used Action   PIN BONE 4 X 140MM STERILE - [id number]  PIN BONE 4 X 140MM STERILE  [employer] [id number] Right 1 Implanted and Explanted   PIN BONE [id number] - [id number]  PIN BONE [id number]  [employer]. [id number] Right 1 Implanted and Explanted   CEMENT BONE ANTIBIO SIMPLEX P - [id number]  CEMENT BONE ANTIBIO SIMPLEX P  [employer]. [id number] Right 1 Implanted   FEMORAL CRUC RTN ABDIRAHMAN SZ 3 RT - [id number]  FEMORAL CRUC RTN ABDIRAHMAN SZ 3 RT  [employer]. [id number] Right 1 Implanted   PATELLA TRIATHLON 29X8 SYMTRC - [id number]  PATELLA TRIATHLON 29X8 SYMTRC  [employer]. [id number] Right 1 Implanted   BASEPLATE TIB ABDIRAHMAN PRIM SZ 3 - [id number]  BASEPLATE TIB ABDIRAHMAN PRIM SZ 3  [employer]. [id number] Right 1 Implanted   INSERT TIBIAL CS 10MM SZ 3 - [id number]  INSERT TIBIAL CS 10MM SZ 3  [employer]. [id number] Right 1 Implanted       Tourniquet time: 40min at 300mmHg    Specimens:   Specimen (24h ago, onward)      None                    Condition: Good    Disposition: PACU - hemodynamically stable.    Attestation: I was present and scrubbed for the entire procedure.    INDICATIONS FOR THE PROCEDURE: A 68F with a history of chronic   Right knee arthritis, had failed all conservative measures including cortisone   injections, PT, viscosupplementation and activity  modification. After a long   discussion, the patient wished to proceed with the procedure above.     PROCEDURE IN DETAIL: Risks, benefits and alternatives of the procedure were   explained to the patient including, but not limited to damage to nerves,   arteries or blood vessels. Also explained risk of infection, stiffness, DVT, PE,   polyethylene wear as well as anesthetic complications including seizure, stroke,   heart attack and death, understood this and signed informed consent. The   patient's Right knee was marked prior to coming to the Operating Room. The patient was brought to the operating room, placed on the operating table in a supine position.A  formal timeout was done in which correct patient, procedure and op site were all   correctly identified and confirmed by the entire operating team.  2gm Ancef was given prior to surgical incision. Spinal anesthesia was induced. The patient'sRight  lower extremity was prepped and   draped in normal sterile fashion. TheRight  leg was exsanguinated with an   Esmarch. Tourniquet was inflated up 300 mmHg. Standard anterior approach to   the knee was made. Medial parapatellar arthrotomy was made, leaving about 1/8   inch of tissue for later repair. Proximal medial tibial release was performed,   making sure not to release any of the MCL. We then   everted the patella and reflexed the knee. Fat pad was excised as well as some   of the ACL. Soft tissue off the distal anterior femur was removed for   visualization, so as to help prevent notching.  We started off by placing our femoral pins.  Two pins were placed about 2 centimeters proximal and 1 centimeter anterior to the medial epicondyle.  We then measured 2 finger breaths below the tibial tubercle and identified the tibial crest.    Two pins were placed just short of bicortically in the tibia.  We then hooked up our arays to our pins.  We placed 2 checkpoints.  One in the tibia and 1 in the femur.  We then took the  leg through range of motion.  We then began by marking off our bony points.  We did this 1st on the femur and then on the tibia.  After this we did ligament balancing of the knee 1st extension and then in flexion by using some spoons and an osteotome.  We then adjusted our bone cuts on the computer and once we liked our plan began by making our cuts.  The Eyeona robotic assisted cutting arm was then brought in and then we made our femoral cuts and then turned our attention to the tibia.  Tibial cuts were made.  All of bone was removed as well as excess soft tissue.  Posterior osteophytes removed as well.  We then began to trial.  We placed a size 3 femur and a size 3 tibia with a size 9 polyethylene.  We were within 1 millimeter between our medial and lateral compartments in both flexion and extension.        We turned our attention to patella, caliper was used on the patella where it   measured 24. We set guide at 16 and made our 8-mm cut for polyethylene component, 29 was selected. Then drill holes were placed and the patellar button was placed.   This had good tracking. No need for a lateral release and with no hand tests,   it stayed centered the whole time. We then marked our tibial rotation. We then drilled our femoral lugs.  We then   removed everything except the size 3 tibial tray. We then checked our tibial tray   with a drop ana again and then marked our rotation and pinned it into place then   drilled, and then punched for our keel. Robot and pins were all removed. We then started preparing final   components on the back table. Anterior, medial and lateral structures were injected with our local   Cocktail. Bony surfaces   copiously irrigated with Pulsavac and then thoroughly dried. Once the cement   was the appropriate consistency, first the tibia and then the femur were   cemented into place, removing excess cement. A 9 trial was placed. The knee   was held in compression and then the patella was placed.  Cement was allowed to   cure. Once the cement was cured, we then removed excess cement. Again trialed   one final time, again seeing a 10. We then tapped into place the   final 10 meniscal bearing into place. After this was done, we then did our   diluted iodine soak for 3 minutes and then proceeded with closing.  Arthrotomy was closed using our StrataFix.   Subcutaneous tissue was closed using 2-0 Vicryl and skin was using a running 3-0   StrataFix and Dermabond.Instrument, sponge, and needle counts were correct prior to wound closure and at the conclusion of the case.  Sterile dressing was applied.   They were extubated, awakened and transferred from the Operating Room to the   Recovery Room in stable condition.

## 2023-09-26 NOTE — ANESTHESIA PROCEDURE NOTES
Right adductor canal    Patient location during procedure: pre-op   Block not for primary anesthetic.  Reason for block: at surgeon's request and post-op pain management   Post-op Pain Location: right knee pain   Start time: 9/26/2023 9:35 AM  Timeout: 9/26/2023 9:35 AM   End time: 9/26/2023 9:40 AM    Staffing  Authorizing Provider: Joaquin Vigil MD  Performing Provider: Joaquin Vigil MD    Staffing  Performed by: Joaquin Vigil MD  Authorized by: Joaquin Vigil MD    Preanesthetic Checklist  Completed: patient identified, IV checked, site marked, risks and benefits discussed, surgical consent, monitors and equipment checked, pre-op evaluation and timeout performed  Peripheral Block  Patient position: supine  Prep: ChloraPrep  Patient monitoring: heart rate, cardiac monitor, continuous pulse ox, continuous capnometry and frequent blood pressure checks  Block type: adductor canal  Laterality: right  Injection technique: single shot  Needle  Needle type: Stimuplex   Needle gauge: 21 G  Needle length: 4 in  Needle localization: anatomical landmarks and ultrasound guidance  Needle insertion depth: 5 cm   -ultrasound image captured on disc.  Assessment  Injection assessment: negative aspiration, negative parasthesia and local visualized surrounding nerve  Paresthesia pain: none  Heart rate change: no  Slow fractionated injection: yes  Pain Tolerance: comfortable throughout block and no complaints  Medications:    Medications: bupivacaine (pf) (MARCAINE) injection 0.5% - Perineural   10 mL - 9/26/2023 9:35:00 AM    Additional Notes  VSS.  [facility] RN monitoring vitals throughout procedure.  Patient tolerated procedure well.     Exparel 20ml + Marcaine 0.5% 10ml dosed under direct Ultrasound guidance.

## 2023-09-26 NOTE — ANESTHESIA POSTPROCEDURE EVALUATION
Anesthesia Post Evaluation    Patient: Kayla Piper    Procedure(s) Performed: Procedure(s) (LRB):  ROBOTIC ARTHROPLASTY, KNEE, TOTAL (Right)    Final Anesthesia Type: spinal      Patient location during evaluation: PACU  Patient participation: Yes- Able to Participate  Level of consciousness: awake and alert and oriented  Post-procedure vital signs: reviewed and stable  Pain management: adequate  Airway patency: patent    PONV status at discharge: No PONV  Anesthetic complications: no      Cardiovascular status: blood pressure returned to baseline and stable  Respiratory status: unassisted and spontaneous ventilation  Hydration status: euvolemic  Follow-up not needed.          Vitals Value Taken Time   BP 94/51 09/26/23 1331   Temp 36.6 °C (97.9 °F) 09/26/23 1150   Pulse 76 09/26/23 1331   Resp 16 09/26/23 1331   SpO2 100 % 09/26/23 1331         Event Time   Out of Recovery 12:54:00         Pain/Moon Score: Pain Rating Prior to Med Admin: 5 (9/26/2023 12:15 PM)  Moon Score: 10 (9/26/2023 12:45 PM)  Modified Moon Score: 19 (9/26/2023  1:30 PM)

## 2023-09-26 NOTE — ANESTHESIA PROCEDURE NOTES
Spinal    Diagnosis: right knee arthroplasty  Patient location during procedure: OR  Start time: 9/26/2023 10:10 AM  Timeout: 9/26/2023 10:10 AM  End time: 9/26/2023 10:20 AM    Staffing  Authorizing Provider: Joaquin Vigil MD  Performing Provider: Joaquin Vigil MD    Staffing  Performed by: Joaquin Vigil MD  Authorized by: Joaquin Vigil MD    Preanesthetic Checklist  Completed: patient identified, IV checked, site marked, risks and benefits discussed, surgical consent, monitors and equipment checked, pre-op evaluation and timeout performed  Spinal Block  Patient position: sitting  Prep: Betadine and ChloraPrep  Patient monitoring: heart rate, cardiac monitor, continuous pulse ox, continuous capnometry and frequent blood pressure checks  Approach: midline  Location: L3-4  Injection technique: single shot  CSF Fluid: clear free-flowing CSF  Needle  Needle type: pencil-tip   Needle gauge: 25 G  Needle length: 4 in  Additional Documentation: incremental injection, negative aspiration for heme and no paresthesia on injection  Needle localization: anatomical landmarks  Assessment  Sensory level: T4   Dermatomal levels determined by pinch or prick  Ease of block: moderate  Patient's tolerance of the procedure: comfortable throughout block and no complaints  Additional Notes  +Barbotage    Medications:    Medications: bupivacaine (pf) (MARCAINE) injection 0.75% - Intraspinal   1.6 mL - 9/26/2023 10:10:00 AM

## 2023-09-26 NOTE — PLAN OF CARE
Patient cleared by PT to discharge to home.  Patient able to void with no problems noted.  Medications delivered to bedside.  Rolling walker for home use returned to patient.  Discharge instructions given to pt and family/friend, verbalized understanding and questions answered. Handouts provided. Belongings given back to pt. IV removed- catheter intact. Discharge via wheelchair.

## 2023-09-26 NOTE — H&P
Past Medical History:   Diagnosis Date    Carotid stenosis, right      DDD (degenerative disc disease), cervical      Depression      Hyperkalemia      Hypertension      Insomnia      Rhinitis      Synovial cyst of popliteal space                 Past Surgical History:   Procedure Laterality Date    ANTERIOR CERVICAL DISCECTOMY W/ FUSION        APPENDECTOMY        CAROTID ENDARTERECTOMY Right 10/03/2018    COLONOSCOPY   2013    KNEE SURGERY Right      TUBAL LIGATION   2000    UMBILICAL HERNIA REPAIR   12/14/2022              Current Outpatient Medications   Medication Sig    albuterol (PROVENTIL/VENTOLIN HFA) 90 mcg/actuation inhaler USE 2 INHALATIONS EVERY 4 TO 6 HOURS AS NEEDED FOR SHORTNESS OF BREATH    allopurinoL (ZYLOPRIM) 300 MG tablet Take 1 tablet (300 mg total) by mouth once daily.    amLODIPine (NORVASC) 10 MG tablet Take 1 tablet (10 mg total) by mouth once daily.    aspirin 325 MG tablet Take 325 mg by mouth once daily.    cetirizine (ZYRTEC) 10 MG tablet Take 10 mg by mouth once daily.    EScitalopram oxalate (LEXAPRO) 20 MG tablet Take 1 tablet (20 mg total) by mouth once daily.    fluticasone propionate (FLONASE) 50 mcg/actuation nasal spray 1 spray by Each Nare route once daily.    gabapentin (NEURONTIN) 300 MG capsule Take 1 capsule (300 mg total) by mouth every evening.    multivitamin (THERAGRAN) per tablet Take 1 tablet by mouth once daily.    progesterone (PROMETRIUM) 200 MG capsule Take 1 capsule by mouth once daily.    rosuvastatin (CRESTOR) 10 MG tablet Take 1 tablet (10 mg total) by mouth once daily.    temazepam (RESTORIL) 30 mg capsule Take 1 capsule (30 mg total) by mouth nightly as needed for Insomnia.    valsartan-hydrochlorothiazide (DIOVAN-HCT) 320-25 mg per tablet Take 1 tablet by mouth once daily.      No current facility-administered medications for this visit.         Review of patient's allergies indicates:  No Known Allergies           Family History   Problem Relation Age of  Onset    Hypertension Mother      Coronary artery disease Mother      Lung cancer Father      Hypertension Sister           Social History               Socioeconomic History    Marital status:    Occupational History    Occupation:    Tobacco Use    Smoking status: Former       Packs/day: 0.25       Years: 18.00       Pack years: 4.50       Types: Cigarettes       Start date:        Quit date:        Years since quittin.5    Smokeless tobacco: Never   Substance and Sexual Activity    Alcohol use: Yes       Comment: occasional    Drug use: No    Sexual activity: Yes       Partners: Male       Birth control/protection: None       Comment:    Social History Narrative     Live with             Chief Complaint: No chief complaint on file.        History of present illness:  67-year-old female seen for chronic right knee pain.  Patient's knee pain is continued to worsen over last few years.  Patient had a previous meniscus repair in that knee.  She is then tried cortisone, a stem cell series as well as a viscosupplementation series.  Patient states that her pain is still as high as an 8/10.  Hard to go up and down stairs.  Patient gets some swelling.        Review of Systems:     Constitution: Negative for chills, fever, and sweats.  Negative for unexplained weight loss.     HENT:  Negative for headaches and blurry vision.     Cardiovascular:Negative for chest pain or irregular heart beat. Negative for hypertension.     Respiratory:  Negative for cough and shortness of breath.     Gastrointestinal: Negative for abdominal pain, heartburn, melena, nausea, and vomitting.     Genitourinary:  Negative bladder incontinence and dysuria.     Musculoskeletal:  See HPI     Neurological: Negative for numbness.     Psychiatric/Behavioral: Negative for depression.  The patient is not nervous/anxious.       Endocrine: Negative for polyuria     Hematologic/Lymphatic: Negative for bleeding  problem.  Does not bruise/bleed easily.     Skin: Negative for poor would healing and rash        Physical Examination:     Vital Signs:  There were no vitals filed for this visit.     There is no height or weight on file to calculate BMI.     This a well-developed, well nourished patient in no acute distress.  They are alert and oriented and cooperative to examination.  Pt. walks without an antalgic gait.       Examination of the right knee shows no rashes or erythema. There are no masses ecchymosis or effusion. Patient has full range of motion from 0-130°. Patient is moderately tender to palpation over lateral joint line and nontender to palpation over the medial joint line. Patient has a - Lachman exam, - anterior drawer exam, and - posterior drawer exam. - Apley exam. Knee is stable to varus and valgus stress. 5 out of 5 motor strength. Palpable distal pulses. Intact light touch sensation. Negative Patellofemoral crepitus     Heart is regular rate without obvious murmurs   Normal respiratory effort without audible wheezing  Abdomen is soft and nontender      X-rays:  X-rays of the right knee are ordered and reviewed which show complete lateral joint space narrowing of the right knee     Assessment::  Right valgus knee arthritis     Plan:  I reviewed the findings with her today.  We talked about treatment options for the osteoarthritis in her knee.  Patient has exhausted all conservative options.  We talked in detail about robotic assisted total knee arthroplasty and she would like to schedule this.  Risks, benefits, and alternatives to the procedure were explained to the patient including but not limited to damage to nerves, arteries, blood vessels, bones, tendons, ligaments, stiffness, instability, infection, permanent limb dysfunction, DVT, PE, as well as general anesthetic complications including seizure, stroke, heart attack and even death. The patient understood these risks and wished to proceed and signed  the informed consent.         All previous pertinent notes including ER visits, physical therapy visits, other orthopedic visits as well as other care for the same musculoskeletal problem were reviewed.  All pertinent lab values and previous imaging was reviewed pertinent to the current visit.     This note was created using Leatt voice recognition software that occasionally misinterpreted phrases or words.     Consult note is delivered via Epic messaging service.

## 2023-09-27 VITALS
SYSTOLIC BLOOD PRESSURE: 94 MMHG | BODY MASS INDEX: 27.44 KG/M2 | HEIGHT: 65 IN | WEIGHT: 164.69 LBS | TEMPERATURE: 98 F | RESPIRATION RATE: 16 BRPM | HEART RATE: 76 BPM | DIASTOLIC BLOOD PRESSURE: 51 MMHG | OXYGEN SATURATION: 100 %

## 2023-09-27 PROCEDURE — G0180 MD CERTIFICATION HHA PATIENT: HCPCS | Mod: ,,, | Performed by: ORTHOPAEDIC SURGERY

## 2023-09-27 PROCEDURE — G0180 PR HOME HEALTH MD CERTIFICATION: ICD-10-PCS | Mod: ,,, | Performed by: ORTHOPAEDIC SURGERY

## 2023-09-27 NOTE — PROGRESS NOTES
9/27/23  Very pleased with care given.  States all staff were  kind and supportive.  States HH/PT coming later today.  States she has read and understands all discharge instructions.

## 2023-09-29 ENCOUNTER — TELEPHONE (OUTPATIENT)
Dept: FAMILY MEDICINE | Facility: CLINIC | Age: 68
End: 2023-09-29
Payer: COMMERCIAL

## 2023-09-29 DIAGNOSIS — K21.9 LARYNGOPHARYNGEAL REFLUX (LPR): Primary | ICD-10-CM

## 2023-09-29 DIAGNOSIS — R13.10 DYSPHAGIA, UNSPECIFIED TYPE: ICD-10-CM

## 2023-09-29 NOTE — TELEPHONE ENCOUNTER
----- Message from Kristan Aguirre sent at 9/29/2023  2:12 PM CDT -----  Regarding: referral  Contact: Patient  Type: Needs Medical Advice  Who Called:  Patient   Symptoms (please be specific):    How long has patient had these symptoms:    Pharmacy name and phone #:     Best Call Back Number: [phone]    Additional Information: Needs a referral for gastro having trouble swolling; please call to advise thanks!

## 2023-09-29 NOTE — TELEPHONE ENCOUNTER
Was she experiencing this prior to her recent surgery?  - General Anesthesia for a total knee replacement on 9/26/2023     If no and she is having problems swallowing water, food etc. Not related to the throat just being sore/dry then I advise she seek further evaluation at the ER

## 2023-09-29 NOTE — TELEPHONE ENCOUNTER
"Spoke with pt. Pt states, " no this is ongoing issue. I have issues with swallowing at times. I had to have my esophagus stretch before"   "

## 2023-10-03 DIAGNOSIS — M17.11 PRIMARY OSTEOARTHRITIS OF RIGHT KNEE: Primary | ICD-10-CM

## 2023-10-09 ENCOUNTER — OFFICE VISIT (OUTPATIENT)
Dept: ORTHOPEDICS | Facility: CLINIC | Age: 68
End: 2023-10-09
Payer: COMMERCIAL

## 2023-10-09 ENCOUNTER — HOSPITAL ENCOUNTER (OUTPATIENT)
Dept: RADIOLOGY | Facility: HOSPITAL | Age: 68
Discharge: HOME | End: 2023-10-09
Attending: ORTHOPAEDIC SURGERY
Payer: COMMERCIAL

## 2023-10-09 VITALS — HEIGHT: 65 IN | RESPIRATION RATE: 18 BRPM | BODY MASS INDEX: 27.32 KG/M2 | WEIGHT: 164 LBS

## 2023-10-09 DIAGNOSIS — Z96.651 STATUS POST TOTAL PROSTHETIC REPLACEMENT OF KNEE JOINT USING CEMENT, RIGHT: Primary | ICD-10-CM

## 2023-10-09 DIAGNOSIS — M17.11 PRIMARY OSTEOARTHRITIS OF RIGHT KNEE: ICD-10-CM

## 2023-10-09 PROCEDURE — 73560 X-RAY EXAM OF KNEE 1 OR 2: CPT | Mod: 26,RT,, | Performed by: RADIOLOGY

## 2023-10-09 PROCEDURE — 73560 X-RAY EXAM OF KNEE 1 OR 2: CPT | Mod: TC,PO,RT

## 2023-10-09 PROCEDURE — 99999 PR PBB SHADOW E&M-EST. PATIENT-LVL III: ICD-10-PCS | Mod: PBBFAC,,, | Performed by: ORTHOPAEDIC SURGERY

## 2023-10-09 PROCEDURE — 99999 PR PBB SHADOW E&M-EST. PATIENT-LVL III: CPT | Mod: PBBFAC,,, | Performed by: ORTHOPAEDIC SURGERY

## 2023-10-09 PROCEDURE — 99024 POSTOP FOLLOW-UP VISIT: CPT | Mod: S$GLB,,, | Performed by: ORTHOPAEDIC SURGERY

## 2023-10-09 PROCEDURE — 99024 PR POST-OP FOLLOW-UP VISIT: ICD-10-PCS | Mod: S$GLB,,, | Performed by: ORTHOPAEDIC SURGERY

## 2023-10-09 PROCEDURE — 73560 XR KNEE 1 OR 2 VIEW RIGHT: ICD-10-PCS | Mod: 26,RT,, | Performed by: RADIOLOGY

## 2023-10-09 NOTE — PROGRESS NOTES
Past Medical History:   Diagnosis Date    Carotid stenosis, right     DDD (degenerative disc disease), cervical     Depression     Hyperkalemia     Hypertension     Insomnia     Rhinitis     Synovial cyst of popliteal space        Past Surgical History:   Procedure Laterality Date    ANTERIOR CERVICAL DISCECTOMY W/ FUSION      APPENDECTOMY      CAROTID ENDARTERECTOMY Right 10/03/2018    COLONOSCOPY  2013    KNEE SURGERY Right     REPAIR MENISCUS SCOPE    ROBOTIC ARTHROPLASTY, KNEE Right 9/26/2023    Procedure: ROBOTIC ARTHROPLASTY, KNEE, TOTAL;  Surgeon: Mao Kearney MD;  Location: [facility];  Service: Orthopedics;  Laterality: Right;    TUBAL LIGATION  2000    UMBILICAL HERNIA REPAIR  12/14/2022       Current Outpatient Medications   Medication Sig    acetaminophen (TYLENOL) 500 MG tablet Take 2 tablets (1,000 mg total) by mouth every 8 (eight) hours as needed for Pain.    albuterol (PROVENTIL/VENTOLIN HFA) 90 mcg/actuation inhaler USE 2 INHALATIONS EVERY 4 TO 6 HOURS AS NEEDED FOR SHORTNESS OF BREATH    allopurinoL (ZYLOPRIM) 300 MG tablet Take 1 tablet (300 mg total) by mouth once daily.    amLODIPine (NORVASC) 10 MG tablet Take 1 tablet (10 mg total) by mouth once daily.    aspirin 325 MG tablet Take 325 mg by mouth once daily.    cetirizine (ZYRTEC) 10 MG tablet Take 10 mg by mouth once daily.    EScitalopram oxalate (LEXAPRO) 20 MG tablet Take 1 tablet (20 mg total) by mouth once daily.    fluticasone propionate (FLONASE) 50 mcg/actuation nasal spray 1 spray by Each Nare route once daily.    gabapentin (NEURONTIN) 300 MG capsule Take 1 capsule (300 mg total) by mouth every evening.    ibuprofen (ADVIL,MOTRIN) 600 MG tablet Take 1 tablet (600 mg total) by mouth 3 (three) times daily as needed for Pain.    multivitamin (THERAGRAN) per tablet Take 1 tablet by mouth once daily.    omeprazole (PRILOSEC) 10 MG capsule Take 10 mg by mouth once daily.    ondansetron (ZOFRAN-ODT) 4 MG TbDL Take 1 tablet (4  mg total) by mouth every 6 (six) hours as needed.    oxyCODONE-acetaminophen (PERCOCET) 5-325 mg per tablet Take 1 tablet by mouth every 6 (six) hours as needed for Pain.    progesterone (PROMETRIUM) 200 MG capsule Take 1 capsule by mouth once daily.    rosuvastatin (CRESTOR) 10 MG tablet Take 1 tablet (10 mg total) by mouth once daily.    temazepam (RESTORIL) 30 mg capsule Take 1 capsule (30 mg total) by mouth nightly as needed for Insomnia.    valsartan-hydrochlorothiazide (DIOVAN-HCT) 320-25 mg per tablet Take 1 tablet by mouth once daily.     No current facility-administered medications for this visit.     Facility-Administered Medications Ordered in Other Visits   Medication    fentaNYL 50 mcg/mL injection 25 mcg    fentaNYL 50 mcg/mL injection 25 mcg    HYDROmorphone (PF) injection 0.2 mg    lactated ringers infusion    LIDOcaine (PF) 10 mg/ml (1%) injection 10 mg    midazolam (VERSED) 1 mg/mL injection 0.5 mg    oxyCODONE immediate release tablet 5 mg       Review of patient's allergies indicates:  No Known Allergies    Family History   Problem Relation Age of Onset    Hypertension Mother     Coronary artery disease Mother     Lung cancer Father     Hypertension Sister        Social History     Socioeconomic History    Marital status:    Occupational History    Occupation:    Tobacco Use    Smoking status: Former     Current packs/day: 0.00     Average packs/day: 0.3 packs/day for 18.0 years (4.5 ttl pk-yrs)     Types: Cigarettes     Start date:      Quit date: 2000     Years since quittin.7    Smokeless tobacco: Never   Substance and Sexual Activity    Alcohol use: Yes     Comment: occasional    Drug use: No    Sexual activity: Yes     Partners: Male     Birth control/protection: None     Comment:    Social History Narrative    Live with      Social Determinants of Health     Financial Resource Strain: Low Risk  (2022)    Overall Financial Resource Strain  (CARDIA)     Difficulty of Paying Living Expenses: Not hard at all   Food Insecurity: No Food Insecurity (6/7/2022)    Hunger Vital Sign     Worried About Running Out of Food in the Last Year: Never true     Ran Out of Food in the Last Year: Never true   Transportation Needs: No Transportation Needs (6/7/2022)    PRAPARE - Transportation     Lack of Transportation (Medical): No     Lack of Transportation (Non-Medical): No   Physical Activity: Insufficiently Active (6/7/2022)    Exercise Vital Sign     Days of Exercise per Week: 4 days     Minutes of Exercise per Session: 30 min   Stress: Stress Concern Present (6/7/2022)    [employer] [employer] - Occupational Stress Questionnaire     Feeling of Stress : Very much   Social Connections: Unknown (6/7/2022)    Social Connection and Isolation Panel [NHANES]     Frequency of Communication with Friends and Family: More than three times a week     Frequency of Social Gatherings with Friends and Family: Patient refused     Active Member of Clubs or Organizations: Yes     Attends Club or Organization Meetings: Patient refused     Marital Status:    Housing Stability: Low Risk  (6/7/2022)    Housing Stability Vital Sign     Unable to Pay for Housing in the Last Year: No     Number of Places Lived in the Last Year: 1     Unstable Housing in the Last Year: No       Chief Complaint: No chief complaint on file.      Date of surgery:  September 26, 2023    History of present illness:  68-year-old female underwent right total knee arthroplasty.  She is doing well.  Pain is 6/10.  PT went well.  Describes it is a throb at night.      Review of Systems:    Musculoskeletal:  See HPI        Physical Examination:    Vital Signs:  There were no vitals filed for this visit.    There is no height or weight on file to calculate BMI.    This a well-developed, well nourished patient in no acute distress.  They are alert and oriented and cooperative to examination.   Pt. walks without an antalgic gait.      Examination of the right knee shows well-healing surgical incision.  No erythema drainage.  Patient has good range of motion.  No calf pain.    X-rays:  Two views of the right knee are ordered and reviewed which show well-aligned right total knee arthroplasty components without complication     Assessment::  Status post right total knee arthroplasty    Plan:  I reviewed the x-rays with her today.  We will transition outpatient physical therapy.  I will see her back in 4 weeks.  No x-rays needed.    This note was created using [employer] voice recognition software that occasionally misinterpreted phrases or words.

## 2023-10-12 ENCOUNTER — CLINICAL SUPPORT (OUTPATIENT)
Dept: REHABILITATION | Facility: HOSPITAL | Age: 68
End: 2023-10-12
Payer: COMMERCIAL

## 2023-10-12 DIAGNOSIS — M25.661 DECREASED ROM OF RIGHT KNEE: ICD-10-CM

## 2023-10-12 DIAGNOSIS — Z74.09 IMPAIRED FUNCTIONAL MOBILITY AND ACTIVITY TOLERANCE: Primary | ICD-10-CM

## 2023-10-12 DIAGNOSIS — R26.9 GAIT DISTURBANCE: ICD-10-CM

## 2023-10-12 DIAGNOSIS — Z96.651 STATUS POST TOTAL PROSTHETIC REPLACEMENT OF KNEE JOINT USING CEMENT, RIGHT: ICD-10-CM

## 2023-10-12 PROCEDURE — 97110 THERAPEUTIC EXERCISES: CPT | Mod: PN

## 2023-10-12 PROCEDURE — 97162 PT EVAL MOD COMPLEX 30 MIN: CPT | Mod: PN

## 2023-10-12 NOTE — PLAN OF CARE
"[facility]   Physical Therapy Initial Evaluation      Name: Kayla Justin [facility] Number: [id number]    Therapy Diagnosis:   Encounter Diagnoses   Name Primary?    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 Has tried multiple interventions including steroid injections, Synvisk, with the latest being stem cell treatment about 8 month ago which did not help. Underwent R TKA on 9/26/2023 without complications.  Was discharged to home the same day and started home health later in the week. Received  PT for 2 weeks; last visit was Monday 10/9/2023.  She is now referred to OP PT.      Falls: None    Imaging: X-Ray Knee 1 or 2 View Right 10/09/2023: FINDINGS per report:  "There has been a cemented tricompartmental right knee arthroplasty.  All hardware components are well seated.  There is no fracture.  A joint effusion is present.  Mild soft tissue swelling is present anteriorly."    Prior Therapy: Home Health PT following this surgery.    Social History:  lives with their spouse in 2 story home, bedroom is on 2nd floor with 14 steps to " "access with rail on R side when ascending.  No steps to enter the home   Occupation: dental/, full time  Prior Level of Function: Prior to surgery was walking without devices but distance limited, stairs were difficult.   Current Level of Function: significant difficulty navigating steps due to limited knee flexion, swelling with activity, increased swelling and pain with walking standing > a few minutes.  Difficulty with car and tub transfers (assists R LE with UE support), Sleep is significantly impaired (limited to about 4 hours per night). Not currently driving.    Pain:  Current 5/10, worst 10/10 (after cooking), best 5/10   Location: posterior and lateral aspect of right knee  Description: constant dull ache.  Intermittent "really intense pain"  Aggravating Factors: Standing, walking, bending, transfers.   Easing Factors: rest, heating pad under back of knee, ice on top of knee, ibuprofen, elevation.      Patients goals: to be able to bend it correctly, to walk up and down stairs without an issue, no pain with getting around.      Medical History:   Past Medical History:   Diagnosis Date    Carotid stenosis, right     DDD (degenerative disc disease), cervical     Depression     Hyperkalemia     Hypertension     Insomnia     Rhinitis     Synovial cyst of popliteal space        Surgical History:   Kayla Piper  has a past surgical history that includes Appendectomy; Knee surgery (Right); Colonoscopy (2013); Anterior cervical discectomy w/ fusion; Carotid endarterectomy (Right, 10/03/2018); Umbilical hernia repair (12/14/2022); Tubal ligation (2000); and robotic arthroplasty, knee (Right, 9/26/2023).    Medications:   Kayla has a current medication list which includes the following prescription(s): acetaminophen, albuterol, allopurinol, amlodipine, aspirin, cetirizine, escitalopram oxalate, fluticasone propionate, gabapentin, ibuprofen, multivitamin, omeprazole, ondansetron, " oxycodone-acetaminophen, progesterone, rosuvastatin, temazepam, and valsartan-hydrochlorothiazide, and the following Facility-Administered Medications: fentanyl, fentanyl, hydromorphone (pf), lactated ringers, lidocaine (pf) 10 mg/ml (1%), midazolam, and oxycodone.    Allergies:   Review of patient's allergies indicates:  No Known Allergies     Objective      Posture: Standing: weight shifted to L with minimal R knee flexion and hip external rotation.  Sitting: R knee flexed to ~ 75* with L @ ~ 90*  Palpation: Tenderness present along incision, areas of increased tenderness present R lateral joint line and along anterior aspect of R tibial plateau (medial and lateral).  Tenderness also present over R lateral hamstring tendon.    Sensation: Area of decreased sensation lateral joint region.   DTRs: Not applicable  Range of Motion/Strength:    Knee  Right   Left  Pain/Dysfunction with Movement    AROM PROM MMT AROM PROM MMT    Flexion  90*  98*  3/5  125*  135*  4+/5    Extension  -20*  -10*  3/5  0*  0*  4+/5    Hip ROM WNL B   Strength R flexion/extension 3+/5, abd/add 4+/5; L hip flexion/extension 4/5, abd/add 4+/5  Ankle ROM WNL B   Strength 4+/5 B  Flexibility: Hamstring length R 137*, L 155*; minimal-moderate R piriformis tightness, L WFL; calves moderate tightness R more so than L.   Gait: Without AD  Analysis: Increased stance width, decreased weight shift to R, decreased stance time on R, insufficient R knee flexion for swing through, decreased R toe off and heel strike, decreased josh.  Bed Mobility:Independent with increased time required.   Transfers: Independent with increased UE support, weight shifted more to L (protecting R knee), increased time required.   Special Tests: Scar mobility: decreased along length of incision with more restriction noted along distal 1/2.  Patellar mobility minimally decreased medial <> lateral glide and moderately decreased superior <> inferior glide.  Other:   Girth:   Knee     Right  Left  @ joint line  36.5 cm 33.1 cm  @ 5 cm proximal 43.9 cm 38.8 cm  @ 10 cm proximal 46.8 cm 42.6 cm  @ 5 cm distal   35.7 cm 33.6 cm  @ 10 cm distal 34.7 cm 35.1 cm      Intake Outcome Measure for FOTO Knee Survey    Therapist reviewed FOTO scores for Kayla Piper on 10/12/2023.   FOTO report - see Media section or FOTO account episode details.    Intake Score: 40%    Primary Measure    Score Range    Intake Score    Score Interpretation    KOOS, JR.             0 - 100               50.0                  Lower Score = Greater Disability           Treatment     Total Treatment time (time-based codes) separate from Evaluation: 12 minutes     Kayla received the treatments listed below:      therapeutic exercises to develop strength and ROM for 8 minutes including:  Quad sets x 10  Stretching into knee flexion using recumbent bike x 5'    manual therapy techniques: Joint mobilizations and passive ROM were applied to the: R knee for 4 minutes, including:  Passive stretching into knee flexion   Into knee extension  Patellar mobilization medial <> lateral, superior <> inferior glide    Patient Education and Home Exercises     Education provided:   - Educated patient in patellar mobilization, OP POC    Written Home Exercises Provided:  To be issued during subsequent visits . Exercises were reviewed and Kayla was able to demonstrate them prior to the end of the session.  Kayla demonstrated good  understanding of the education provided. See EMR under Patient Instructions for exercises provided during therapy sessions.    Assessment     Kayla is a 68 y.o. 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 Patient presents with therapeutic diagnoses of decreased ROM R knee, gait disturbance, and impaired functional mobility/activity tolerance.  Additional problems include knee pain, swelling,  limited LE strength, decreased LE flexibility, impaired scar mobility, impaired patellar mobility.  These problems contribute to decreased standing tolerance, decreased walking tolerance, difficulty with transfers, limited driving, and impaired sleep.  She will benefit from skilled PT services to address these problems in order to resolve functional mobility deficit and to maximize activity tolerance which will allow her to return to work.     Patient prognosis is Good.   Patient will benefit from skilled outpatient Physical Therapy to address the deficits stated above and in the chart below, provide patient /family education, and to maximize patientt's level of independence.     Plan of care discussed with patient: Yes  Patient's spiritual, cultural and educational needs considered and patient is agreeable to the plan of care and goals as stated below:     Anticipated Barriers for therapy: None    Medical Necessity is demonstrated by the following  History  Co-morbidities and personal factors that may impact the plan of care [] LOW: no personal factors / co-morbidities  [x] MODERATE: 1-2 personal factors / co-morbidities  [] HIGH: 3+ personal factors / co-morbidities    Moderate / High Support Documentation:   Co-morbidities affecting plan of care: Asthma, HTN.     Personal Factors:   no deficits     Examination  Body Structures and Functions, activity limitations and participation restrictions that may impact the plan of care [] LOW: addressing 1-2 elements  [] MODERATE: 3+ elements  [x] HIGH: 4+ elements (please support below)    Moderate / High Support Documentation: Limited ROM/strength, difficulty with self care, limited home care activities, unable to work, driving limited, limited participation in recreational/community activities     Clinical Presentation [] LOW: stable  [x] MODERATE: Evolving  [] HIGH: Unstable     Decision Making/ Complexity Score: moderate       Goals:  Short Term Goals: 3 weeks   1)  Decrease swelling by 1 cm at each affected level  2) Improve AROM R knee to 5* - 115* to assist with transfers  3) Patient will demonstrate equal weight bearing during sit <> stand transfers 8 of 10 trials  4) Patient will perform car transfer without UE assist for LE management 7 of 10 trials    Long Term Goals: 6 weeks   1) Patient will consistently sleep through the night without interruption by knee pain  2) Patient will consistently ambulate over level surfaces demonstrating appropriate foot clearance with adequate knee flexion, consistent R heel strike/toe off, equal weight bearing/stance time  3) Patient will navigate a flight of steps utilizing reciprocating gait pattern and minimal UE support  4) Patient will resume her regular household chores  5) Patient will resume light work activities.  6) Improve functional score to > 70% as indicated by FOTO knee survey  7) Patient will be independent in HEP.     Plan     Plan of care Certification: 10/12/2023 to 11/24/2023.    Outpatient Physical Therapy 3 times weekly for 6 weeks to include the following interventions: Electrical Stimulation NMES, Gait Training, Manual Therapy, Moist Heat/ Ice, Neuromuscular Re-ed, Patient Education, Therapeutic Activities, Therapeutic Exercise, and Therapeutic Taping .  Kayal may at times be seen by a PTA as part of the Rehab Team    Karey Mccrary PT        Physician's Signature: _________________________________________ Date: ________________

## 2023-10-16 ENCOUNTER — CLINICAL SUPPORT (OUTPATIENT)
Dept: REHABILITATION | Facility: HOSPITAL | Age: 68
End: 2023-10-16
Payer: COMMERCIAL

## 2023-10-16 DIAGNOSIS — R26.9 GAIT DISTURBANCE: ICD-10-CM

## 2023-10-16 DIAGNOSIS — M25.661 DECREASED ROM OF RIGHT KNEE: ICD-10-CM

## 2023-10-16 DIAGNOSIS — Z74.09 IMPAIRED FUNCTIONAL MOBILITY AND ACTIVITY TOLERANCE: Primary | ICD-10-CM

## 2023-10-16 DIAGNOSIS — Z96.651 STATUS POST TOTAL PROSTHETIC REPLACEMENT OF KNEE JOINT USING CEMENT, RIGHT: ICD-10-CM

## 2023-10-16 PROCEDURE — 97110 THERAPEUTIC EXERCISES: CPT | Mod: PN,CQ

## 2023-10-16 NOTE — PROGRESS NOTES
"[facility]   Physical Therapy Treatment Note     Name: Kayla Justin [facility] Number: [id number]    Therapy Diagnosis:   Encounter Diagnoses   Name Primary?    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  Response to previous treatment: "It was okay, not sore afterwards or anything."  Functional change: N/A at this time    Pain: 6-7/10  Location: right knee      OBJECTIVE     Seated knee AROM post Rx: 23*-0-103*     Treatment     Kayla received the treatments listed below:      therapeutic exercises to develop strength, endurance, ROM, and flexibility for 53 minutes including:  Recumbent bike for self stretch w/seat at 6 x6'  Standing gastroc stretches 3x30s   March on R x10   Hip extension on R x10   Hamstring curl on R x10    Hip flexion SLR on R x10   Alt. toe taps on 8" step x1'   R TKE using red theraband x10  Seated hamstring stretch 3x30s on R   AAROM heel slides x10   LAQ x10    March x10  Supine R hip flexor stretch 3x30s   R heel slides x10 AROM & x10 AAROM " using strap (0-110*)   R Quad sets x10   R SAQ x10   R AAROM hip flexion SLR x10  Prone R hip extension x10   R hamstring curl x10    To be added:   Soleus stretch on wedge, closed chain TKE & stair navigation exercises, mini squat, bridges    Patient Education and Home Exercises     Home Exercises Provided and Patient Education Provided     Education provided:   - Rehabilitation process was reviewed with the patient, and patient was instructed in initial treatment program as stated above in bold print.    Written Home Exercises Provided:  The patient was instructed to continue HEP provided by home health, and increased HEP will be provided in subsequent session(s) . Exercises were reviewed and Kayla was able to demonstrate them prior to the end of the session.  Kayla demonstrated good  understanding of the education provided. See EMR under Patient Instructions for exercises provided during therapy sessions    ASSESSMENT     The patient reported to physical therapy stating continued pain post-operatively. Kayla Piper was instructed in initial treatment program as stated above, some of which patient reports performing some variation at home using provided exercises by previous home health PT. Patient also states pain relief in quad stretch position in supine, which patient reports performing in bed at night when most painful. Edema present; patient stating compliance with cold pack and propping into knee extension, except in bed due to discomfort while being able to sleep. Kayla is able to lay lower extremity into full knee extension, although uncomfortable, but the patient lacks terminal knee extension at this time. Knee flexion is gradually progressing. Quads are weak, demonstrates assistance needed with hip flexion and lacking full terminal knee extension, but began targeting. The patient deferred cold pack post treatment, stating she would utilize once returning home.    Kayla Is progressing slowly  towards her goals.   Pt prognosis is Good.     Pt will continue to benefit from skilled outpatient physical therapy to address the deficits listed in the problem list box on initial evaluation, provide pt/family education and to maximize pt's level of independence in the home and community environment.     Pt's spiritual, cultural and educational needs considered and pt agreeable to plan of care and goals.     Anticipated barriers to physical therapy: None    Goals:  Short Term Goals: 3 weeks   1) Decrease swelling by 1 cm at each affected level Ongoing  2) Improve AROM R knee to 5* - 115* to assist with transfers Gradually progressing  3) Patient will demonstrate equal weight bearing during sit <> stand transfers 8 of 10 trials Progressing  4) Patient will perform car transfer without UE assist for LE management 7 of 10 trials Initiated     Long Term Goals: 6 weeks   1) Patient will consistently sleep through the night without interruption by knee pain Ongoing  2) Patient will consistently ambulate over level surfaces demonstrating appropriate foot clearance with adequate knee flexion, consistent R heel strike/toe off, equal weight bearing/stance time Gradually progressing  3) Patient will navigate a flight of steps utilizing reciprocating gait pattern and minimal UE support Partially initiated  4) Patient will resume her regular household chores Ongoing  5) Patient will resume light work activities. Ongoing  6) Improve functional score to > 70% as indicated by FOTO knee survey Ongoing  7) Patient will be independent in HEP. Partially initiated    PLAN     POC: Outpatient Physical Therapy 3 times weekly for 6 weeks to include the following interventions: Electrical Stimulation NMES, Gait Training, Manual Therapy, Moist Heat/ Ice, Neuromuscular Re-ed, Patient Education, Therapeutic Activities, Therapeutic Exercise, and Therapeutic Taping. Kayla may at times be seen by a PTA as part of the Rehab Team.    The patient  is to be progressed within the established plan of care as tolerated in order to accomplish goals as stated above and increase overall function post-operatively. Plan to incorporate standing soleus stretches using wedge, mini squats, stair navigation exercises, and bridging. Provide formal written home exercise program in addition to exercises provided by home health in subsequent session(s). If needed due to continued lack of terminal knee extension, trial of neuromuscular re-education electrical stimulation to assist quads during long and or short arc quads. Utilize scar management techniques as needed.    Federica Washington, PTA

## 2023-10-17 ENCOUNTER — OFFICE VISIT (OUTPATIENT)
Dept: GASTROENTEROLOGY | Facility: CLINIC | Age: 68
End: 2023-10-17
Payer: COMMERCIAL

## 2023-10-17 VITALS
WEIGHT: 164.44 LBS | BODY MASS INDEX: 27.37 KG/M2 | HEART RATE: 74 BPM | SYSTOLIC BLOOD PRESSURE: 158 MMHG | DIASTOLIC BLOOD PRESSURE: 74 MMHG

## 2023-10-17 DIAGNOSIS — K21.9 GASTROESOPHAGEAL REFLUX DISEASE WITHOUT ESOPHAGITIS: ICD-10-CM

## 2023-10-17 DIAGNOSIS — Z12.11 SCREENING FOR MALIGNANT NEOPLASM OF COLON: Primary | ICD-10-CM

## 2023-10-17 DIAGNOSIS — R13.10 DYSPHAGIA, UNSPECIFIED TYPE: ICD-10-CM

## 2023-10-17 PROCEDURE — 99204 PR OFFICE/OUTPT VISIT, NEW, LEVL IV, 45-59 MIN: ICD-10-PCS | Mod: S$GLB,,, | Performed by: INTERNAL MEDICINE

## 2023-10-17 PROCEDURE — 99999 PR PBB SHADOW E&M-EST. PATIENT-LVL V: ICD-10-PCS | Mod: PBBFAC,,, | Performed by: INTERNAL MEDICINE

## 2023-10-17 PROCEDURE — 99999 PR PBB SHADOW E&M-EST. PATIENT-LVL V: CPT | Mod: PBBFAC,,, | Performed by: INTERNAL MEDICINE

## 2023-10-17 PROCEDURE — 99204 OFFICE O/P NEW MOD 45 MIN: CPT | Mod: S$GLB,,, | Performed by: INTERNAL MEDICINE

## 2023-10-17 NOTE — H&P (VIEW-ONLY)
Subjective     Patient ID: Kayla Piper is a 68 y.o. female.    This patient is new to me.  Referring provider:  Janet Aggarwal, NP for dysphagia, GERD.      Chief Complaint: Dysphagia and Gastroesophageal Reflux    Gastroesophageal Reflux  She complains of abdominal pain, dysphagia (recurrent/worsening, has been dilated in the remote past) and heartburn. She reports no chest pain, no coughing, no nausea, no sore throat or no wheezing. This is a recurrent problem. The current episode started more than 1 month ago. The problem occurs frequently. The problem has been gradually worsening. The heartburn is of moderate intensity. The heartburn wakes her from sleep. The heartburn does not limit her activity. The heartburn changes with position. The symptoms are aggravated by certain foods. Pertinent negatives include no anemia, fatigue, melena or weight loss. There are no known risk factors. She has tried a PPI (take very low dose omeprazole) for the symptoms. The treatment provided no relief. Past procedures include an EGD (last EGD many years ago). Last colonoscopy was 10 years ago with no polyps.  She had negative Cologuard 3 years ago.     Review of Systems   Constitutional:  Negative for chills, fatigue, fever and weight loss.   HENT:  Positive for trouble swallowing. Negative for sore throat.    Respiratory:  Negative for cough, shortness of breath and wheezing.    Cardiovascular:  Negative for chest pain and palpitations.   Gastrointestinal:  Positive for abdominal pain, dysphagia (recurrent/worsening, has been dilated in the remote past) and heartburn. Negative for blood in stool, melena, nausea and vomiting.   Genitourinary:  Negative for dysuria and hematuria.   Musculoskeletal:  Negative for arthralgias and myalgias.   Integumentary:  Negative for color change and rash.   Neurological:  Negative for dizziness and headaches.   Hematological:  Negative for adenopathy.   Psychiatric/Behavioral:   Negative for confusion. The patient is not nervous/anxious.    All other systems reviewed and are negative.         Objective     Vitals:    10/17/23 1446   BP: (!) 158/74   BP Location: Left arm   Patient Position: Sitting   Pulse: 74   Weight: 74.6 kg (164 lb 7.4 oz)         Physical Exam  Constitutional:       Appearance: She is well-developed.   HENT:      Head: Normocephalic and atraumatic.   Eyes:      General: No scleral icterus.     Pupils: Pupils are equal, round, and reactive to light.   Cardiovascular:      Rate and Rhythm: Normal rate and regular rhythm.      Heart sounds: No murmur heard.  Pulmonary:      Effort: Pulmonary effort is normal.      Breath sounds: Normal breath sounds. No wheezing.   Abdominal:      General: Bowel sounds are normal. There is no distension.      Palpations: Abdomen is soft.      Tenderness: There is no abdominal tenderness.   Musculoskeletal:         General: No tenderness.      Cervical back: Normal range of motion.   Lymphadenopathy:      Cervical: No cervical adenopathy.   Skin:     General: Skin is warm and dry.      Findings: No rash.   Neurological:      Mental Status: She is alert.       Lab Results   Component Value Date    WBC 8.00 09/14/2023    HGB 12.3 09/14/2023    HCT 38.2 09/14/2023    MCV 86 09/14/2023     09/14/2023         CMP  Sodium   Date Value Ref Range Status   09/14/2023 139 136 - 145 mmol/L Final     Potassium   Date Value Ref Range Status   09/14/2023 3.8 3.5 - 5.1 mmol/L Final     Chloride   Date Value Ref Range Status   09/14/2023 102 95 - 110 mmol/L Final     CO2   Date Value Ref Range Status   09/14/2023 25 23 - 29 mmol/L Final     Glucose   Date Value Ref Range Status   09/14/2023 98 70 - 110 mg/dL Final     BUN   Date Value Ref Range Status   09/14/2023 21 8 - 23 mg/dL Final     Creatinine   Date Value Ref Range Status   09/14/2023 0.9 0.5 - 1.4 mg/dL Final     Calcium   Date Value Ref Range Status   09/14/2023 10.2 8.7 - 10.5 mg/dL  Final     Total Protein   Date Value Ref Range Status   07/25/2023 7.4 6.0 - 8.4 g/dL Final     Albumin   Date Value Ref Range Status   07/25/2023 4.1 3.5 - 5.2 g/dL Final     Total Bilirubin   Date Value Ref Range Status   07/25/2023 0.4 0.1 - 1.0 mg/dL Final     Comment:     For infants and newborns, interpretation of results should be based  on gestational age, weight and in agreement with clinical  observations.    Premature Infant recommended reference ranges:  Up to 24 hours.............<8.0 mg/dL  Up to 48 hours............<12.0 mg/dL  3-5 days..................<15.0 mg/dL  6-29 days.................<15.0 mg/dL       Alkaline Phosphatase   Date Value Ref Range Status   07/25/2023 65 55 - 135 U/L Final     AST   Date Value Ref Range Status   07/25/2023 24 10 - 40 U/L Final     ALT   Date Value Ref Range Status   07/25/2023 17 10 - 44 U/L Final     Anion Gap   Date Value Ref Range Status   09/14/2023 12 8 - 16 mmol/L Final     eGFR   Date Value Ref Range Status   09/14/2023 >60 >60 mL/min/1.73 m^2 Final       ECG was independently visualized and reviewed by me and showed NSR.    Further history was obtained from the patient's family member who was present throughout the interview and states that they were not aware that Cologuard was not adequate for polyp detection, only cancer screening.  After discussing pros/cons in detail, she wishes to have colonoscopy for screening.  History is otherwise as above in the HPI.          Assessment and Plan     1. Screening for malignant neoplasm of colon  -     Case Request Endoscopy: COLONOSCOPY    2. Dysphagia, unspecified type  -     Ambulatory referral/consult to Gastroenterology  -     Case Request Endoscopy: EGD (ESOPHAGOGASTRODUODENOSCOPY)    3. Gastroesophageal reflux disease without esophagitis        Antireflux precautions including avoidance of late night eating and lying down soon after eating.     EGD for above  Continue PPI.  May need to increase  dose.  Colonoscopy for screening.         No follow-ups on file.

## 2023-10-18 ENCOUNTER — CLINICAL SUPPORT (OUTPATIENT)
Dept: REHABILITATION | Facility: HOSPITAL | Age: 68
End: 2023-10-18
Payer: COMMERCIAL

## 2023-10-18 DIAGNOSIS — Z74.09 IMPAIRED FUNCTIONAL MOBILITY AND ACTIVITY TOLERANCE: Primary | ICD-10-CM

## 2023-10-18 DIAGNOSIS — Z96.651 STATUS POST TOTAL PROSTHETIC REPLACEMENT OF KNEE JOINT USING CEMENT, RIGHT: ICD-10-CM

## 2023-10-18 DIAGNOSIS — M25.661 DECREASED ROM OF RIGHT KNEE: ICD-10-CM

## 2023-10-18 DIAGNOSIS — R26.9 GAIT DISTURBANCE: ICD-10-CM

## 2023-10-18 PROCEDURE — 97014 ELECTRIC STIMULATION THERAPY: CPT | Mod: PN,CQ

## 2023-10-18 PROCEDURE — 97110 THERAPEUTIC EXERCISES: CPT | Mod: PN,CQ

## 2023-10-18 NOTE — PROGRESS NOTES
"[facility]   Physical Therapy Treatment Note     Name: Kayla Justin [facility] Number: [id number]    Therapy Diagnosis:   Encounter Diagnoses   Name Primary?    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, right   Evaluation Date: 10/12/2023  Authorization Period Expiration: 10/08/2024   Plan of Care Expiration: 11/24/2023  Progress Note Due: 11/24/2023  Date of Surgery: 9/26/2023  Visit # / Visits authorized: 2/ 20 (3 total)  FOTO: 1/ 3              Next survey due session 5     Precautions: Weightbearing as tolerated    PTA Visit #: 2/5     Time In: 1410  Time Out: 1520  Total Billable Time: 70 minutes (10' of w/utilizing NMES)    SUBJECTIVE     Pt reports: "I'm doing alright." "When they took the bandages off they told me that I could start like rubbing things into it, so in the mornings I use Lubriderm and at night I use vitamin E oil." "Is this knee ever going to look like a normal knee?"  She was compliant with home exercise program that was provided by home health PT.  Response to previous treatment: "It wasn't bad, not excruciating."  Functional change: N/A at this time    Pain: 0/10  Location: right knee     OBJECTIVE     Objective Measures updated at progress report unless specified.    Treatment     Kayla received the treatments listed below:      therapeutic exercises to develop strength, endurance, ROM, and flexibility for 60 minutes including:  Recumbent bike for self stretch w/seat at 6 x8'  Standing gastroc stretches 3x30s   Soleus stretch 3x30s   March on R x20   Hip extension on R x20   Hamstring curl on R " "x20   Hip flexion SLR on R x16   Alt. toe taps on 8" step x2'   R TKE using red theraband x20   R closed chain TKE on 4" step x10   Step up & over 4" step using R as power x10 ea way   Lateral step up & over 4" step x10 ea way  Seated hamstring stretch 3x30s on R   AAROM heel slides x15   LAQ 2x10    March 2x10  Supine R hip flexor stretch 3x30s   R heel slides x15 AROM & x15 AAROM using strap (0-113*)   R Quad sets 2x10   R SAQ in conjunction with NMES (pt able to achieve TKE)   R AAROM hip flexion SLR x10  Prone R hip extension x10 (continue & progress)   R hamstring curl x10 (continue & progress)    To be added:   Mini squat, Bridges; Scar management manually & KT tape once eschar is sloughed    supervised modalities after being cleared for contradictions: NMES Electrical Stimulation:  Kayla received Russian NMES Electrical Stimulation to elicit muscle contraction of the quads to promote active terminal knee extension. Pt received stimulation at a rate of 50 pps with symmetric current, ramp of 2 seconds with 5 second on time and 5 second off time for a total of 10 minutes. Patient tolerated treatment well without any adverse effects.     Patient Education and Home Exercises     Home Exercises Provided and Patient Education Provided     Education provided:   - The patient was progressed with additional therapeutic exercise as stated above. Utilization process and reasoning of NMES was reviewed with the patient.    Written Home Exercises Provided:  The patient was instructed to continue HEP provided by home health, and increased HEP will be provided in subsequent session(s) . Exercises were reviewed and Kayla was able to demonstrate them prior to the end of the session.  Kayla demonstrated good  understanding of the education provided. See EMR under Patient Instructions for exercises provided during therapy sessions.    ASSESSMENT     The patient reported to physical therapy demonstrating lack of terminal knee " extension and continued knee flexion during stance phase of gait. Bandages were removed from incision and visible due to patient wearing shorts; incision is raised, and puckering with slight eschar medially and laterally to incision, near stitches would have been inserted on distal portion. This is why kinesiology tape has not yet been utilized, but the patient reports utilizing manual scar management techniques at home. Will begin in subsequent session. Kayla Piper was progressed with increased exercise complexity and additional strength, range of motion, functional, and flexibility training as stated above for improved activities of daily. The patient was independent during straight leg raises today. Patient was able to achieve terminal knee extension post quad sets and during long arc quads, utilizing neuromuscular re-education electrical stimulation. Kayla was also able to ambulate out of the clinic demonstrating improved knee extension during stance phase of gait, but demonstrating antalgic pattern.    Kayla Is progressing slowly towards her goals.   Pt prognosis is Good.     Pt will continue to benefit from skilled outpatient physical therapy to address the deficits listed in the problem list box on initial evaluation, provide pt/family education and to maximize pt's level of independence in the home and community environment.     Pt's spiritual, cultural and educational needs considered and pt agreeable to plan of care and goals.     Anticipated barriers to physical therapy: None    Goals:  Short Term Goals: 3 weeks   1) Decrease swelling by 1 cm at each affected level Ongoing  2) Improve AROM R knee to 5* - 115* to assist with transfers Progressing  3) Patient will demonstrate equal weight bearing during sit <> stand transfers 8 of 10 trials Progressing  4) Patient will perform car transfer without UE assist for LE management 7 of 10 trials Progressing     Long Term Goals: 6 weeks   1) Patient will  consistently sleep through the night without interruption by knee pain Ongoing  2) Patient will consistently ambulate over level surfaces demonstrating appropriate foot clearance with adequate knee flexion, consistent R heel strike/toe off, equal weight bearing/stance time Gradually progressing  3) Patient will navigate a flight of steps utilizing reciprocating gait pattern and minimal UE support Initiated  4) Patient will resume her regular household chores Ongoing  5) Patient will resume light work activities. Ongoing  6) Improve functional score to > 70% as indicated by FOTO knee survey Ongoing  7) Patient will be independent in HEP. Partially initiated    PLAN     POC: Outpatient Physical Therapy 3 times weekly for 6 weeks to include the following interventions: Electrical Stimulation NMES, Gait Training, Manual Therapy, Moist Heat/ Ice, Neuromuscular Re-ed, Patient Education, Therapeutic Activities, Therapeutic Exercise, and Therapeutic Taping. Kayla may at times be seen by a PTA as part of the Rehab Team.    The patient is to be progressed within the established plan of care as tolerated in order to accomplish goals as stated above and increase overall function post-operatively. Begin scar management techniques. Plan to incorporate mini squats and bridging. Provide formal written home exercise program in addition to exercises provided by home health in subsequent session(s).    Federica Washington PTA

## 2023-10-23 ENCOUNTER — ANESTHESIA EVENT (OUTPATIENT)
Dept: ENDOSCOPY | Facility: HOSPITAL | Age: 68
End: 2023-10-23
Payer: COMMERCIAL

## 2023-10-23 ENCOUNTER — HOSPITAL ENCOUNTER (OUTPATIENT)
Facility: HOSPITAL | Age: 68
Discharge: HOME | End: 2023-10-23
Attending: INTERNAL MEDICINE | Admitting: INTERNAL MEDICINE
Payer: COMMERCIAL

## 2023-10-23 ENCOUNTER — ANESTHESIA (OUTPATIENT)
Dept: ENDOSCOPY | Facility: HOSPITAL | Age: 68
End: 2023-10-23
Payer: COMMERCIAL

## 2023-10-23 DIAGNOSIS — K29.60 EROSIVE GASTRITIS: ICD-10-CM

## 2023-10-23 DIAGNOSIS — K22.2 SCHATZKI'S RING: Primary | ICD-10-CM

## 2023-10-23 DIAGNOSIS — R13.10 DYSPHAGIA: ICD-10-CM

## 2023-10-23 DIAGNOSIS — K44.9 HIATAL HERNIA: ICD-10-CM

## 2023-10-23 PROCEDURE — C1769 GUIDE WIRE: HCPCS | Performed by: INTERNAL MEDICINE

## 2023-10-23 PROCEDURE — 63600175 PHARM REV CODE 636 W HCPCS: Performed by: NURSE ANESTHETIST, CERTIFIED REGISTERED

## 2023-10-23 PROCEDURE — 43248 PR EGD, FLEX, W/DILATION OVER GUIDEWIRE: ICD-10-PCS | Mod: ,,, | Performed by: INTERNAL MEDICINE

## 2023-10-23 PROCEDURE — D9220A PRA ANESTHESIA: ICD-10-PCS | Mod: ANES,,, | Performed by: ANESTHESIOLOGY

## 2023-10-23 PROCEDURE — 43248 EGD GUIDE WIRE INSERTION: CPT | Mod: ,,, | Performed by: INTERNAL MEDICINE

## 2023-10-23 PROCEDURE — D9220A PRA ANESTHESIA: Mod: CRNA,,, | Performed by: NURSE ANESTHETIST, CERTIFIED REGISTERED

## 2023-10-23 PROCEDURE — 37000008 HC ANESTHESIA 1ST 15 MINUTES: Performed by: INTERNAL MEDICINE

## 2023-10-23 PROCEDURE — 27201012 HC FORCEPS, HOT/COLD, DISP: Performed by: INTERNAL MEDICINE

## 2023-10-23 PROCEDURE — D9220A PRA ANESTHESIA: ICD-10-PCS | Mod: CRNA,,, | Performed by: NURSE ANESTHETIST, CERTIFIED REGISTERED

## 2023-10-23 PROCEDURE — 43239 EGD BIOPSY SINGLE/MULTIPLE: CPT | Mod: 59 | Performed by: INTERNAL MEDICINE

## 2023-10-23 PROCEDURE — D9220A PRA ANESTHESIA: Mod: ANES,,, | Performed by: ANESTHESIOLOGY

## 2023-10-23 PROCEDURE — 25000003 PHARM REV CODE 250: Performed by: INTERNAL MEDICINE

## 2023-10-23 PROCEDURE — 43239 EGD BIOPSY SINGLE/MULTIPLE: CPT | Mod: 59,,, | Performed by: INTERNAL MEDICINE

## 2023-10-23 PROCEDURE — 43239 PR EGD, FLEX, W/BIOPSY, SGL/MULTI: ICD-10-PCS | Mod: 59,,, | Performed by: INTERNAL MEDICINE

## 2023-10-23 PROCEDURE — 25000003 PHARM REV CODE 250: Performed by: NURSE ANESTHETIST, CERTIFIED REGISTERED

## 2023-10-23 PROCEDURE — 43248 EGD GUIDE WIRE INSERTION: CPT | Performed by: INTERNAL MEDICINE

## 2023-10-23 RX ORDER — PROPOFOL 10 MG/ML
VIAL (ML) INTRAVENOUS
Status: DISCONTINUED | OUTPATIENT
Start: 2023-10-23 | End: 2023-10-23

## 2023-10-23 RX ORDER — SODIUM CHLORIDE 9 MG/ML
INJECTION, SOLUTION INTRAVENOUS CONTINUOUS
Status: DISCONTINUED | OUTPATIENT
Start: 2023-10-23 | End: 2023-10-23 | Stop reason: HOSPADM

## 2023-10-23 RX ORDER — OMEPRAZOLE 40 MG/1
40 CAPSULE, DELAYED RELEASE ORAL DAILY
Qty: 90 CAPSULE | Refills: 3 | Status: SHIPPED | OUTPATIENT
Start: 2023-10-23 | End: 2024-10-22

## 2023-10-23 RX ORDER — LIDOCAINE HYDROCHLORIDE 20 MG/ML
INJECTION INTRAVENOUS
Status: DISCONTINUED | OUTPATIENT
Start: 2023-10-23 | End: 2023-10-23

## 2023-10-23 RX ADMIN — LIDOCAINE HYDROCHLORIDE 75 MG: 20 INJECTION, SOLUTION INTRAVENOUS at 01:10

## 2023-10-23 RX ADMIN — PROPOFOL 100 MG: 10 INJECTION, EMULSION INTRAVENOUS at 01:10

## 2023-10-23 RX ADMIN — SODIUM CHLORIDE: 9 INJECTION, SOLUTION INTRAVENOUS at 11:10

## 2023-10-23 RX ADMIN — PROPOFOL 50 MG: 10 INJECTION, EMULSION INTRAVENOUS at 01:10

## 2023-10-23 NOTE — ANESTHESIA PREPROCEDURE EVALUATION
10/23/2023  Kayla Piper is a 68 y.o., female.      Pre-op Assessment    I have reviewed the Patient Summary Reports.     I have reviewed the Nursing Notes. I have reviewed the NPO Status.   I have reviewed the Medications.     Review of Systems  Anesthesia Hx:  No problems with previous Anesthesia    Social:  Former Smoker    Cardiovascular:   Hypertension    Pulmonary:  Pulmonary Normal    Renal/:  Renal/ Normal     Hepatic/GI:   GERD, well controlled    Musculoskeletal:   Arthritis   Spine Disorders: cervical Disc disease    Neurological:   Neuromuscular Disease,    Endocrine:  Endocrine Normal    Psych:   Psychiatric History depression          Physical Exam  General: Well nourished, Cooperative, Alert and Oriented    Airway:  Mallampati: II   Mouth Opening: Normal  TM Distance: Normal  Neck ROM: Normal ROM        Anesthesia Plan  Type of Anesthesia, risks & benefits discussed:    Anesthesia Type: Gen Natural Airway  Intra-op Monitoring Plan: Standard ASA Monitors  Post Op Pain Control Plan: multimodal analgesia  Induction:  IV  Airway Plan: Direct, Video and Fiberoptic, Post-Induction  Informed Consent: Informed consent signed with the Patient and all parties understand the risks and agree with anesthesia plan.  All questions answered.   ASA Score: 2    Ready For Surgery From Anesthesia Perspective.     .

## 2023-10-23 NOTE — OR NURSING
Vss, scott po fluids, denies pain, ambulates easily. IV removed, catheter intact. Discharge instructions provided and states understanding. States ready to go home.  Discharged from facility with family per wheelchair.

## 2023-10-23 NOTE — TRANSFER OF CARE
"Anesthesia Transfer of Care Note    Patient: Kayla Piper    Procedure(s) Performed: Procedure(s) (LRB):  EGD (ESOPHAGOGASTRODUODENOSCOPY) (N/A)    Patient location: GI    Anesthesia Type: general    Transport from OR: Transported from OR on room air with adequate spontaneous ventilation    Post pain: adequate analgesia    Post assessment: no apparent anesthetic complications    Post vital signs: stable    Level of consciousness: sedated    Nausea/Vomiting: no nausea/vomiting    Complications: none    Transfer of care protocol was followed      Last vitals:   Visit Vitals  /62 (BP Location: Left arm, Patient Position: Lying)   Pulse 74   Temp 36.8 °C (98.2 °F) (Skin)   Resp 16   Ht 5' 4" (1.626 m)   Wt 72.6 kg (160 lb)   SpO2 100%   Breastfeeding No   BMI 27.46 kg/m²     "

## 2023-10-23 NOTE — PROVATION PATIENT INSTRUCTIONS
Discharge Summary/Instructions after an Endoscopic Procedure  Patient Name: Kayla Piper  Patient MRN: [id number]  Patient YOB: 1955 Monday, October 23, 2023  Martin Haas MD  Dear patient,  As a result of recent federal legislation (The Federal Cures Act), you may   receive lab or pathology results from your procedure in your [facility]   account before your physician is able to contact you. Your physician or   their representative will relay the results to you with their   recommendations at their soonest availability.  Thank you,  RESTRICTIONS:  During your procedure today, you received medications for sedation.  These   medications may affect your judgment, balance and coordination.  Therefore,   for 24 hours, you have the following restrictions:   - DO NOT drive a car, operate machinery, make legal/financial decisions,   sign important papers or drink alcohol.    ACTIVITY:  Today: no heavy lifting, straining or running due to procedural   sedation/anesthesia.  The following day: return to full activity including work.  DIET:  Eat and drink normally unless instructed otherwise.     TREATMENT FOR COMMON SIDE EFFECTS:  - Mild abdominal pain, nausea, belching, bloating or excessive gas:  rest,   eat lightly and use a heating pad.  - Sore Throat: treat with throat lozenges and/or gargle with warm salt   water.  - Because air was used during the procedure, expelling large amounts of air   from your rectum or belching is normal.  - If a bowel prep was taken, you may not have a bowel movement for 1-3 days.    This is normal.  SYMPTOMS TO WATCH FOR AND REPORT TO YOUR PHYSICIAN:  1. Abdominal pain or bloating, other than gas cramps.  2. Chest pain.  3. Back pain.  4. Signs of infection such as: chills or fever occurring within 24 hours   after the procedure.  5. Rectal bleeding, which would show as bright red, maroon, or black stools.   (A tablespoon of blood from the rectum is not serious, especially  if   hemorrhoids are present.)  6. Vomiting.  7. Weakness or dizziness.  GO DIRECTLY TO THE NEAREST EMERGENCY ROOM IF YOU HAVE ANY OF THE FOLLOWING:      Difficulty breathing              Chills and/or fever over 101 F   Persistent vomiting and/or vomiting blood   Severe abdominal pain   Severe chest pain   Black, tarry stools   Bleeding- more than one tablespoon   Any other symptom or condition that you feel may need urgent attention  Your doctor recommends these additional instructions:  If any biopsies were taken, your doctors clinic will contact you in 1 to 2   weeks with any results.  - Patient has a contact number available for emergencies.  The signs and   symptoms of potential delayed complications were discussed with the   patient.  Return to normal activities tomorrow.  Written discharge   instructions were provided to the patient.   - Resume previous diet.   - Continue present medications.   - No aspirin, ibuprofen, naproxen, or other non-steroidal anti-inflammatory   drugs.   - Await pathology results.   - Discharge patient to home (ambulatory).   - Follow an antireflux regimen.   - Use Prilosec (omeprazole) 40 mg PO daily.   - Return to GI office after studies are complete.  For questions, problems or results please call your physician - Martin Haas MD at Work:  [phone].  [facility], EMERGENCY ROOM PHONE NUMBER: [phone]  IF A COMPLICATION OR EMERGENCY SITUATION ARISES AND YOU ARE UNABLE TO REACH   YOUR PHYSICIAN - GO DIRECTLY TO THE EMERGENCY ROOM.  Martin Haas MD  10/23/2023 1:30:15 PM  This report has been verified and signed electronically.  Dear patient,  As a result of recent federal legislation (The Federal Cures Act), you may   receive lab or pathology results from your procedure in your [facility]   account before your physician is able to contact you. Your physician or   their representative will relay the results to you with their   recommendations at their  soonest availability.  Thank you,  [employer]ATION

## 2023-10-24 ENCOUNTER — CLINICAL SUPPORT (OUTPATIENT)
Dept: REHABILITATION | Facility: HOSPITAL | Age: 68
End: 2023-10-24
Payer: COMMERCIAL

## 2023-10-24 ENCOUNTER — EXTERNAL HOME HEALTH (OUTPATIENT)
Dept: HOME HEALTH SERVICES | Facility: HOSPITAL | Age: 68
End: 2023-10-24
Payer: COMMERCIAL

## 2023-10-24 VITALS
WEIGHT: 160 LBS | OXYGEN SATURATION: 100 % | BODY MASS INDEX: 27.31 KG/M2 | HEIGHT: 64 IN | DIASTOLIC BLOOD PRESSURE: 62 MMHG | RESPIRATION RATE: 18 BRPM | TEMPERATURE: 98 F | SYSTOLIC BLOOD PRESSURE: 135 MMHG | HEART RATE: 75 BPM

## 2023-10-24 DIAGNOSIS — M25.661 DECREASED ROM OF RIGHT KNEE: ICD-10-CM

## 2023-10-24 DIAGNOSIS — Z74.09 IMPAIRED FUNCTIONAL MOBILITY AND ACTIVITY TOLERANCE: Primary | ICD-10-CM

## 2023-10-24 DIAGNOSIS — Z96.651 STATUS POST TOTAL PROSTHETIC REPLACEMENT OF KNEE JOINT USING CEMENT, RIGHT: ICD-10-CM

## 2023-10-24 DIAGNOSIS — R26.9 GAIT DISTURBANCE: ICD-10-CM

## 2023-10-24 PROCEDURE — 97014 ELECTRIC STIMULATION THERAPY: CPT | Mod: PN,CQ

## 2023-10-24 PROCEDURE — 97140 MANUAL THERAPY 1/> REGIONS: CPT | Mod: PN,CQ

## 2023-10-24 PROCEDURE — 97110 THERAPEUTIC EXERCISES: CPT | Mod: PN,CQ

## 2023-10-24 NOTE — ANESTHESIA POSTPROCEDURE EVALUATION
Anesthesia Post Evaluation    Patient: Kayla Piper    Procedure(s) Performed: Procedure(s) (LRB):  EGD (ESOPHAGOGASTRODUODENOSCOPY) (N/A)    Final Anesthesia Type: general      Patient location during evaluation: PACU  Patient participation: Yes- Able to Participate  Level of consciousness: awake and alert and oriented  Post-procedure vital signs: reviewed and stable  Pain management: adequate  Airway patency: patent    PONV status at discharge: No PONV  Anesthetic complications: no      Cardiovascular status: blood pressure returned to baseline  Respiratory status: unassisted, spontaneous ventilation and room air  Hydration status: euvolemic  Follow-up not needed.          Vitals Value Taken Time   /62 10/23/23 1345   Temp 36.6 10/24/23 1122   Pulse 75 10/23/23 1345   Resp 18 10/23/23 1345   SpO2 100 % 10/23/23 1345         Event Time   Out of Recovery 14:00:00         Pain/Moon Score: Moon Score: 10 (10/23/2023  1:45 PM)

## 2023-10-24 NOTE — PROGRESS NOTES
"[facility]   Physical Therapy Treatment Note     Name: Kayla Justin [facility] [place] Number: [id number]    Therapy Diagnosis:   Encounter Diagnoses   Name Primary?    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 I have been doing stuff at home to make it bend." "Kicking it out straight is the hardest; I didn't know my leg was that heavy."  She was compliant with home exercise program that was provided by home health PT.  Response to previous treatment: "It wasn't bad."  Functional change: N/A at this time    Pain: 0/10  Location: right knee     OBJECTIVE     Objective Measures updated at progress report unless specified.    Treatment     Kayla received the treatments listed below:      therapeutic exercises to develop strength, endurance, ROM, and flexibility for 57 minutes including:  Recumbent bike for self stretch w/seat at 4-3 x8' (achieving full revolutions)  Standing gastroc stretches 3x30s   Soleus stretch 3x30s   March on R using 1# x20   Hip extension on R using 1# x20   Hamstring curl on R using 1# x20   Hip flexion SLR on R " "using 1# x16   Mini squat x15   Alt. toe taps on 8" step using 1# x2'   R TKE using green theraband x20   R closed chain TKE on 6" step x10   Step up & over 6" step using R as power w/1# x10 ea way   Lateral step up & over 6" step w/1# x10 ea way  Seated hamstring stretch 3x30s on R   AROM heel slides using 1# x20 (112* flexion)   LAQ using 1# x20    March using 1# x20  Supine R hip flexor stretch 3x30s   R heel slides x20 AROM & x20 AAROM using strap (0-119*)   R Quad sets x20   R SAQ in conjunction with NMES   R AROM hip flexion SLR x20  Prone R hip extension x10 (continue & progress)   R hamstring curl x10 (continue & progress)    To be added:   Bridges, Shuttle leg press    supervised modalities after being cleared for contradictions: NMES Electrical Stimulation:  Kayla received Russian NMES Electrical Stimulation to elicit muscle contraction of the quads to promote active terminal knee extension. Pt received stimulation at a rate of 50 pps with symmetric current, ramp of 2 seconds with 5 second on time and 5 second off time for a total of 10 minutes. Patient tolerated treatment well without any adverse effects.     manual therapy techniques: Friction Massage and Kinesiology were applied to the: Incision on Right Knee for 8 minutes, including: (incorporate retrograde scar massage as well)  Cross friction massage to incision  Kinesiology tape applied using I strip anchored distal to incision without tension, ~15% tension placed in oscillating pattern, anchored proximal to incision to address scar formation and mobility    Patient Education and Home Exercises     Home Exercises Provided and Patient Education Provided     Education provided:   - The patient was instructed in increased exercise complexity as stated above in bold print. Kinesiology tape use was reviewed with the patient, along with care and removal.    Written Home Exercises Provided: yes. Exercises were reviewed and Kayla was able to demonstrate " them prior to the end of the session. Kayla demonstrated good  understanding of the education provided. See EMR under Patient Instructions for exercises provided during therapy sessions.    ASSESSMENT     The patient reported to physical therapy demonstrating slight antalgic gait, not achieving terminal knee extension. Kayla Piper was progressed with increased exercise complexity as stated above to promote increased knee range of motion, lower extremity strength, control, and stability, which in turn is aiding in patient's return to full function without restriction. Patient lacks full active terminal knee extension, but is able to rest at 0* knee extension and achieve actively during neuromuscular re-education electrical stimulation. Kayla is gradually progressing knee range of motion. Incision is closed at this time, and scar is raised and puckered, although reduced from prior. Kinesiology tape applied to aid in scar formation and mobility.    Kayla Is progressing slowly towards her goals.   Pt prognosis is Good.     Pt will continue to benefit from skilled outpatient physical therapy to address the deficits listed in the problem list box on initial evaluation, provide pt/family education and to maximize pt's level of independence in the home and community environment.     Pt's spiritual, cultural and educational needs considered and pt agreeable to plan of care and goals.     Anticipated barriers to physical therapy: None    Goals:  Short Term Goals: 3 weeks   1) Decrease swelling by 1 cm at each affected level Ongoing  2) Improve AROM R knee to 5* - 115* to assist with transfers Progressing  3) Patient will demonstrate equal weight bearing during sit <> stand transfers 8 of 10 trials Progressing  4) Patient will perform car transfer without UE assist for LE management 7 of 10 trials Progressing     Long Term Goals: 6 weeks   1) Patient will consistently sleep through the night without interruption by knee  pain Ongoing  2) Patient will consistently ambulate over level surfaces demonstrating appropriate foot clearance with adequate knee flexion, consistent R heel strike/toe off, equal weight bearing/stance time Gradually progressing  3) Patient will navigate a flight of steps utilizing reciprocating gait pattern and minimal UE support Slow progress  4) Patient will resume her regular household chores Ongoing  5) Patient will resume light work activities. Ongoing  6) Improve functional score to > 70% as indicated by FOTO knee survey Ongoing  7) Patient will be independent in HEP. Partially initiated    PLAN     POC: Outpatient Physical Therapy 3 times weekly for 6 weeks to include the following interventions: Electrical Stimulation NMES, Gait Training, Manual Therapy, Moist Heat/ Ice, Neuromuscular Re-ed, Patient Education, Therapeutic Activities, Therapeutic Exercise, and Therapeutic Taping. Kayla may at times be seen by a PTA as part of the Rehab Team.    The patient is to be progressed within the established plan of care as tolerated in order to accomplish goals as stated above and increase overall function post-operatively. Plan to incorporate bridging and shuttle leg press for increased knee range of motion, strength, and stability.    Federica Washington, PTA

## 2023-10-25 ENCOUNTER — CLINICAL SUPPORT (OUTPATIENT)
Dept: REHABILITATION | Facility: HOSPITAL | Age: 68
End: 2023-10-25
Payer: COMMERCIAL

## 2023-10-25 ENCOUNTER — PATIENT MESSAGE (OUTPATIENT)
Dept: ENDOSCOPY | Facility: HOSPITAL | Age: 68
End: 2023-10-25

## 2023-10-25 DIAGNOSIS — M25.661 DECREASED ROM OF RIGHT KNEE: ICD-10-CM

## 2023-10-25 DIAGNOSIS — Z74.09 IMPAIRED FUNCTIONAL MOBILITY AND ACTIVITY TOLERANCE: Primary | ICD-10-CM

## 2023-10-25 DIAGNOSIS — R26.9 GAIT DISTURBANCE: ICD-10-CM

## 2023-10-25 PROCEDURE — 97014 ELECTRIC STIMULATION THERAPY: CPT | Mod: PN

## 2023-10-25 PROCEDURE — 97110 THERAPEUTIC EXERCISES: CPT | Mod: PN

## 2023-10-25 NOTE — PROGRESS NOTES
"[facility]   Physical Therapy Treatment Note     Name: Kayla Justin [facility] Number: [id number]    Therapy Diagnosis:   Encounter Diagnoses   Name Primary?    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, right   Evaluation Date: 10/12/2023  Authorization Period Expiration: 10/08/2024   Plan of Care Expiration: 11/24/2023  Progress Note Due: 11/24/2023  Date of Surgery: 9/26/2023  Visit # / Visits authorized: 4/ 20 (5 total)  FOTO: 1/ 3               Next survey due next session      Precautions: Weightbearing as tolerated    PTA Visit #: 0/5     Time In: 1510  Time Out: 1630  Total Billable Time: 70 minutes (10' of w/utilizing NMES)    SUBJECTIVE     Pt reports: "My knee is hurting and I haven't even started yet"  She was compliant with home exercise program that was provided by home health PT (has not yet started the new exercises provided by PTA).  Response to previous treatment: "Little sore.  Ibuprofen or Aleve takes care of it"  Functional change: Improved stance time on R when walking    Pain: 4/10  Location: right knee     OBJECTIVE     Objective Measures updated at progress report unless specified.    Treatment     Kayla received the treatments listed below:      therapeutic exercises to develop strength, endurance, ROM, and flexibility for 57 minutes including:  Recumbent bike L2 w/seat at 4x10' achieving full revolutions)  Standing gastroc stretches 3x30s   Soleus stretch 3x30s   March on R using 2# x20   Hip extension on R using 2# x20   Hamstring curl on R using 2# x20   Hip flexion SLR on R using 2# x16   Mini squat x20   Alt. toe taps on 8" step while standing on airex using 2# x2'   R TKE " "using green theraband x20   R closed chain TKE on 6" step x10   Step up & over 6" step using R as power w/2# x10 ea way   Lateral step up & over 6" step w/2# x10 ea way  Shuttle leg press x 20 ea   Bilateral with 50#   R only with 25#  Seated hamstring stretch 3x30s on R   AROM heel slides using 2# x20 (112* flexion)   LAQ using 2# x20    March using 2# x20  Supine R hip flexor stretch 3x30s   R heel slides x20 AROM & x20 AAROM using strap (0-119*)   R Quad sets x20   R SAQ in conjunction with NMES   R AROM hip flexion SLR 2#x20  Prone R hip extension 2# 2x10    R hamstring curl 2# 2x10     To be added:   Bridges    supervised modalities after being cleared for contradictions: NMES Electrical Stimulation:  Kayla received Russian NMES Electrical Stimulation to elicit muscle contraction of the quads to promote active terminal knee extension. Pt received stimulation at a rate of 50 pps with symmetric current, ramp of 2 seconds with 10 second on time and 10 second off time for a total of 10 minutes. Patient tolerated treatment well without any adverse effects.     manual therapy techniques: Friction Massage and Kinesiology were applied to the: Incision on Right Knee for 3 minutes, including: (incorporate retrograde scar massage as well)   Passive stretching into extension  Cross friction massage to incision  Kinesiology tape applied using I strip anchored distal to incision without tension, ~15% tension placed in oscillating pattern, anchored proximal to incision to address scar formation and mobility    Patient Education and Home Exercises     Home Exercises Provided and Patient Education Provided     Education provided:   - The patient was instructed to begin prone hanging using light weight at ankle and towel roll under distal thigh to enhance the stretch.    Written Home Exercises Provided: Patient was instructed to continue prior HEP, including exercises issued by PTA last visit and prone hang. Exercises were " reviewed and Kayla was able to demonstrate them prior to the end of the session. Kayla demonstrated good  understanding of the education provided. See EMR under Patient Instructions for exercises provided during therapy sessions.    ASSESSMENT     The patient presents to PT today with reports in increased knee pain; however, she was able to perform exercise program with increased intensity without significant difficulty.  She demonstrated no change in knee ROM today but strength is improving. She demonstrates improved gait quality.     Kayla Is progressing towards her goals.   Pt prognosis is Good.     Pt will continue to benefit from skilled outpatient physical therapy to address the deficits listed in the problem list box on initial evaluation, provide pt/family education and to maximize pt's level of independence in the home and community environment.     Pt's spiritual, cultural and educational needs considered and pt agreeable to plan of care and goals.     Anticipated barriers to physical therapy: None    Goals:  Short Term Goals: 3 weeks   1) Decrease swelling by 1 cm at each affected level Progressing  2) Improve AROM R knee to 5* - 115* to assist with transfers Progressing  3) Patient will demonstrate equal weight bearing during sit <> stand transfers 8 of 10 trials Progressing  4) Patient will perform car transfer without UE assist for LE management 7 of 10 trials Progressing     Long Term Goals: 6 weeks   1) Patient will consistently sleep through the night without interruption by knee pain Minimal progress  2) Patient will consistently ambulate over level surfaces demonstrating appropriate foot clearance with adequate knee flexion, consistent R heel strike/toe off, equal weight bearing/stance time Gradually progressing  3) Patient will navigate a flight of steps utilizing reciprocating gait pattern and minimal UE support Slowly progressing  4) Patient will resume her regular household chores  Ongoing  5) Patient will resume light work activities. Ongoing  6) Improve functional score to > 70% as indicated by FOTO knee survey Ongoing  7) Patient will be independent in HEP. Initiated    PLAN     POC: Outpatient Physical Therapy 3 times weekly for 6 weeks to include the following interventions: Electrical Stimulation NMES, Gait Training, Manual Therapy, Moist Heat/ Ice, Neuromuscular Re-ed, Patient Education, Therapeutic Activities, Therapeutic Exercise, and Therapeutic Taping. Kayla may at times be seen by a PTA as part of the Rehab Team.    The patient is to be progressed within the established plan of care as tolerated in order to accomplish goals as stated above and increase overall function post-operatively. Add prone hang, instruct in prone quad sets, passive stretch to enhance extension.     Karey Mccrary, PT

## 2023-10-27 ENCOUNTER — CLINICAL SUPPORT (OUTPATIENT)
Dept: REHABILITATION | Facility: HOSPITAL | Age: 68
End: 2023-10-27
Payer: COMMERCIAL

## 2023-10-27 DIAGNOSIS — R26.9 GAIT DISTURBANCE: ICD-10-CM

## 2023-10-27 DIAGNOSIS — Z74.09 IMPAIRED FUNCTIONAL MOBILITY AND ACTIVITY TOLERANCE: Primary | ICD-10-CM

## 2023-10-27 DIAGNOSIS — M25.661 DECREASED ROM OF RIGHT KNEE: ICD-10-CM

## 2023-10-27 PROCEDURE — 97110 THERAPEUTIC EXERCISES: CPT | Mod: PN,CQ

## 2023-10-27 NOTE — PROGRESS NOTES
"[facility]   Physical Therapy Treatment Note     Name: Kayla Justin [facility] Number: [id number]    Therapy Diagnosis:   Encounter Diagnoses   Name Primary?    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 54.8/100 Lower Score = Greater Disability               Next survey due session 10     Precautions: Weightbearing as tolerated    PTA Visit #: 1/5     Time In: 1240  Time Out: 1345  Total Billable Time: 65 minutes    SUBJECTIVE     Pt reports: "My knee never really hurts before." "I go back to work soon."  She was compliant with home exercise program.  Response to previous treatment: reports soreness  Functional change: Improved stance time on R when walking    Pain: 0/10  Location: right knee     OBJECTIVE     Objective Measures updated at progress report unless specified.    Treatment     Kayla received the treatments listed below:      therapeutic exercises to develop strength, endurance, ROM, and flexibility for 65 minutes including:  Recumbent bike Lvl3 w/seat at 2 x10' (increase resistance & then transition to Treadmill)  Standing gastroc stretches 3x30s   Soleus stretch 3x30s   March on R using 2# x20 (increase wt)   Hip extension on R using 2# x20 (increase wt)   Hamstring curl on R using 2# x20 (increase wt)   Hip flexion SLR on R using 2# x16 (increase wt)   Mini squat x20   Alt. toe taps on 8" step while " "standing on airex using 2# x2' (increase wt)   R TKE using green theraband x20   R closed chain TKE on 6" step x10   Step up & over 8" step using R as power w/2# x10 ea way (increase wt)   Lateral step up & over 8" step w/2# x10 ea way (increase wt)  Shuttle leg press x20 ea   Bilateral with 50#   R only with 25#  Seated hamstring stretch 3x30s on R   AROM heel slides using 2# x20 (120* flexion)   LAQ using 2# x20  (increase wt)    March using 2# x20 (increase wt)  Supine R hip flexor stretch 3x30s   R heel slides using 2# x20 AROM & x20 AAROM using strap (0-120*)   R Quad sets x20   R SAQ using 2# x20    *reviewed prone knee propping using ankle wt (2# today) for stretch into knee extension*    Did not perform on this date:   R AROM hip flexion SLR 2#x20  Prone R hip extension 2# 2x10    R hamstring curl 2# 2x10    To be added:   Shaye    manual therapy techniques: Friction Massage and Kinesiology were applied to the: Incision on Right Knee for 0 minutes, including: (incorporate retrograde scar massage as well)  Cross friction massage to incision  Kinesiology tape applied using I strip anchored distal to incision without tension, ~15% tension placed in oscillating pattern, anchored proximal to incision to address scar formation and mobility    Patient Education and Home Exercises     Home Exercises Provided and Patient Education Provided     Education provided:   - The patient was instructed in increased exercise complexity as stated above in bold print. Prone knee stretch into extension was reviewed with the patient, and education was provided on edema control for return to work.    Written Home Exercises Provided: Patient was instructed to continue prior HEP, including prone stretch into knee extension. Exercises were reviewed and Kayla was able to demonstrate them prior to the end of the session. Kayla demonstrated good  understanding of the education provided. See EMR under Patient Instructions for " exercises provided during therapy sessions.    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 Kayla Piper was progressed with increased exercise complexity focusing on increased knee range of motion, lower extremity strength, stability, and control to assist with function for activities of daily living. Patient demonstrates full knee extension today, but prone propping was still reviewed for maintenance. Moderate edema continues to be present, and patient reporting increased towards the end of the day. Kinesiology tape still donned from previously, and scar appears to be laying down more uniformly, will assess in subsequent session when tape has been doffed.    Kayla Is progressing towards her goals.   Pt prognosis is Good.     Pt will continue to benefit from skilled outpatient physical therapy to address the deficits listed in the problem list box on initial evaluation, provide pt/family education and to maximize pt's level of independence in the home and community environment.     Pt's spiritual, cultural and educational needs considered and pt agreeable to plan of care and goals.     Anticipated barriers to physical therapy: None    Goals:  Short Term Goals: 3 weeks   1) Decrease swelling by 1 cm at each affected level Progressing  2) Improve AROM R knee to 5* - 115* to assist with transfers Progressing/Nearly met (met for active ext & AAROM flexion)  3) Patient will demonstrate equal weight bearing during sit <> stand transfers 8 of 10 trials Progressing  4) Patient will perform car transfer without UE assist for LE management 7 of 10 trials Progressing     Long Term Goals: 6 weeks   1) Patient will consistently sleep through the night without interruption by knee pain Minimal progress  2) Patient will consistently ambulate over level surfaces demonstrating appropriate foot clearance with adequate knee flexion, consistent R heel  strike/toe off, equal weight bearing/stance time Progressing  3) Patient will navigate a flight of steps utilizing reciprocating gait pattern and minimal UE support Progressing  4) Patient will resume her regular household chores Slowly progressing  5) Patient will resume light work activities. Ongoing  6) Improve functional score to > 70% as indicated by FOTO knee survey Progressing  7) Patient will be independent in HEP. Progressing    PLAN     POC: Outpatient Physical Therapy 3 times weekly for 6 weeks to include the following interventions: Electrical Stimulation NMES, Gait Training, Manual Therapy, Moist Heat/ Ice, Neuromuscular Re-ed, Patient Education, Therapeutic Activities, Therapeutic Exercise, and Therapeutic Taping. Kayla may at times be seen by a PTA as part of the Rehab Team.    The patient is to be progressed within the established plan of care as tolerated in order to accomplish goals as stated above and increase overall function post-operatively. Plan to incorporate in prone quad sets/TKEs and bridges in subsequent session.    Federica Washington, PTA

## 2023-10-31 ENCOUNTER — CLINICAL SUPPORT (OUTPATIENT)
Dept: REHABILITATION | Facility: HOSPITAL | Age: 68
End: 2023-10-31
Payer: COMMERCIAL

## 2023-10-31 DIAGNOSIS — R26.9 GAIT DISTURBANCE: ICD-10-CM

## 2023-10-31 DIAGNOSIS — Z74.09 IMPAIRED FUNCTIONAL MOBILITY AND ACTIVITY TOLERANCE: Primary | ICD-10-CM

## 2023-10-31 DIAGNOSIS — M25.661 DECREASED ROM OF RIGHT KNEE: ICD-10-CM

## 2023-10-31 PROCEDURE — 97110 THERAPEUTIC EXERCISES: CPT | Mod: PN,CQ

## 2023-10-31 NOTE — PROGRESS NOTES
"[facility]   Physical Therapy Treatment Note     Name: Kayla Justin [facility] Number: [id number]    Therapy Diagnosis:   Encounter Diagnoses   Name Primary?    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 54.8/100 Lower Score = Greater Disability               Next survey due session 10     Precautions: Weightbearing as tolerated    PTA Visit #: 2/5     Time In: 1234  Time Out: 1329  Total Billable Time: 55 minutes    SUBJECTIVE     Pt reports: "It is hurting on the sides and down the outside of my shin. Its like the more I move the more it hurts, but I can't not move. I go back to work Monday, so we shall see."  She was compliant with home exercise program.  Response to previous treatment: "I went home and took some ibuprofen and iced it."  Functional change: Improved stance time on R when walking    Pain: 5/10  Location: right knee     OBJECTIVE     Objective Measures updated at progress report unless specified.    Treatment     Kayla received the treatments listed below:      therapeutic exercises to develop strength, endurance, ROM, and flexibility for 53 minutes including:  Recumbent bike Lvl3 w/seat at lowest setting x10' (increase resistance & then transition to Treadmill)  Standing gastroc stretches 3x30s   Soleus stretch 3x30s   March on R using 3# x20 (increase wt)   Hip extension on R using 3# x20 (increase " "wt)   Hamstring curl on R using 3# x20 (increase wt)   Hip flexion SLR on R using 3# x20   Mini squat x20   Alt. toe taps on 8" step while standing on airex using 3# x2' (increase wt)   R TKE using green theraband x20   R closed chain TKE on 6" step x10   Step up & over 8" step using R as power w/3# x10 ea way (increase wt)(Due to knee buckling from fatigue)   Lateral step up & over 8" step w/3# x10 ea way (increase wt)(Due to knee buckling from fatigue)  Shuttle leg press x20 ea   Bilateral with 50#   R only with 25#  Seated hamstring stretch 3x30s on R   AROM heel slides using 3# x20   LAQ using 3# x20    March using 3# x20 (increase wt)  Supine R hip flexor stretch 3x30s   R heel slides using 3# x20 AROM & x20 AAROM using strap (0-122*)   R Quad sets x20   R SAQ using 3# x20   Hooklying bridging x13    Did not perform on this date: (Can continue & progress)   R AROM hip flexion SLR 2#x20  Prone R hip extension 2# 2x10    R hamstring curl 2# 2x10    To be added:   Prone TKE/quad sets    manual therapy techniques: Friction Massage and Kinesiology were applied to the: Incision on Right Knee for 2 minutes, including: (incorporate retrograde scar massage as well)  Cross friction massage to incision  Kinesiology tape applied using I strip anchored distal to incision without tension, ~15% tension placed in oscillating pattern, anchored proximal to incision to address scar formation and mobility    Patient Education and Home Exercises     Home Exercises Provided and Patient Education Provided     Education provided:   - The patient was instructed in increased exercise complexity as stated above in bold print.    Written Home Exercises Provided: Patient was instructed to continue prior HEP, including prone stretch into knee extension. Exercises were reviewed and Kayla was able to demonstrate them prior to the end of the session. Kayla demonstrated good  understanding of the education provided. See EMR under Patient " "Instructions for exercises provided during therapy sessions.    ASSESSMENT     The patient reported to physical therapy with present pain. Kayla Piper was progressed with increased exercise complexity as stated above in order to promote increased knee range of motion, lower extremity strength, stability, and endurance; however, upon ambulating to bar patient demonstrates as patient reports "knee giving out." Patient did not loose her balance. The patient reports that right knee gives out with at times with increased fatigue. This also happened during toe taps on steps, so stair navigation was avoiding on this date for safety. Veronique is planning on returning to work next week. Edema minimal to moderately present, and incision is laying down more uniformly with more restrictions proximally(more so) and distally. Due to this kinesiology tape was re-applied.    Kayla Is progressing towards her goals.   Pt prognosis is Good.     Pt will continue to benefit from skilled outpatient physical therapy to address the deficits listed in the problem list box on initial evaluation, provide pt/family education and to maximize pt's level of independence in the home and community environment.     Pt's spiritual, cultural and educational needs considered and pt agreeable to plan of care and goals.     Anticipated barriers to physical therapy: None    Goals:  Short Term Goals: 3 weeks   1) Decrease swelling by 1 cm at each affected level Progressing  2) Improve AROM R knee to 5* - 115* to assist with transfers Progressing/Nearly met (met for active ext & AAROM flexion)  3) Patient will demonstrate equal weight bearing during sit <> stand transfers 8 of 10 trials Progressing  4) Patient will perform car transfer without UE assist for LE management 7 of 10 trials Progressing     Long Term Goals: 6 weeks   1) Patient will consistently sleep through the night without interruption by knee pain Minimal progress  2) Patient will " consistently ambulate over level surfaces demonstrating appropriate foot clearance with adequate knee flexion, consistent R heel strike/toe off, equal weight bearing/stance time Progressing  3) Patient will navigate a flight of steps utilizing reciprocating gait pattern and minimal UE support Progressing  4) Patient will resume her regular household chores Slowly progressing  5) Patient will resume light work activities. Ongoing  6) Improve functional score to > 70% as indicated by FOTO knee survey Progressing  7) Patient will be independent in HEP. Progressing    PLAN     POC: Outpatient Physical Therapy 3 times weekly for 6 weeks to include the following interventions: Electrical Stimulation NMES, Gait Training, Manual Therapy, Moist Heat/ Ice, Neuromuscular Re-ed, Patient Education, Therapeutic Activities, Therapeutic Exercise, and Therapeutic Taping. Kayla may at times be seen by a PTA as part of the Rehab Team.    The patient is to be progressed within the established plan of care as tolerated in order to accomplish goals as stated above and increase overall function post-operatively. Plan to incorporate in prone quad sets/TKEs in subsequent session. Continue stair navigation exercises in subsequent session, and utilize kinesiology tape as needed for scar management.    Federica Washington, PTA

## 2023-11-06 ENCOUNTER — OFFICE VISIT (OUTPATIENT)
Dept: ORTHOPEDICS | Facility: CLINIC | Age: 68
End: 2023-11-06
Payer: COMMERCIAL

## 2023-11-06 VITALS — WEIGHT: 160 LBS | BODY MASS INDEX: 27.31 KG/M2 | HEIGHT: 64 IN | RESPIRATION RATE: 18 BRPM

## 2023-11-06 DIAGNOSIS — Z96.651 STATUS POST TOTAL PROSTHETIC REPLACEMENT OF KNEE JOINT USING CEMENT, RIGHT: Primary | ICD-10-CM

## 2023-11-06 PROCEDURE — 99024 POSTOP FOLLOW-UP VISIT: CPT | Mod: S$GLB,,, | Performed by: ORTHOPAEDIC SURGERY

## 2023-11-06 PROCEDURE — 99999 PR PBB SHADOW E&M-EST. PATIENT-LVL III: ICD-10-PCS | Mod: PBBFAC,,, | Performed by: ORTHOPAEDIC SURGERY

## 2023-11-06 PROCEDURE — 99999 PR PBB SHADOW E&M-EST. PATIENT-LVL III: CPT | Mod: PBBFAC,,, | Performed by: ORTHOPAEDIC SURGERY

## 2023-11-06 PROCEDURE — 99024 PR POST-OP FOLLOW-UP VISIT: ICD-10-PCS | Mod: S$GLB,,, | Performed by: ORTHOPAEDIC SURGERY

## 2023-11-06 NOTE — PROGRESS NOTES
Past Medical History:   Diagnosis Date    Carotid stenosis, right     DDD (degenerative disc disease), cervical     Depression     Hyperkalemia     Hypertension     Insomnia     Rhinitis     Synovial cyst of popliteal space        Past Surgical History:   Procedure Laterality Date    ANTERIOR CERVICAL DISCECTOMY W/ FUSION      APPENDECTOMY      CAROTID ENDARTERECTOMY Right 10/03/2018    COLONOSCOPY  2013    ESOPHAGOGASTRODUODENOSCOPY N/A 10/23/2023    Procedure: EGD (ESOPHAGOGASTRODUODENOSCOPY);  Surgeon: Martin Carvalho MD;  Location: [facility] ENDO;  Service: Endoscopy;  Laterality: N/A;    KNEE SURGERY Right     REPAIR MENISCUS SCOPE    ROBOTIC ARTHROPLASTY, KNEE Right 9/26/2023    Procedure: ROBOTIC ARTHROPLASTY, KNEE, TOTAL;  Surgeon: Mao Kearney MD;  Location: [facility] OR;  Service: Orthopedics;  Laterality: Right;    TUBAL LIGATION  2000    UMBILICAL HERNIA REPAIR  12/14/2022       Current Outpatient Medications   Medication Sig    acetaminophen (TYLENOL) 500 MG tablet Take 2 tablets (1,000 mg total) by mouth every 8 (eight) hours as needed for Pain.    albuterol (PROVENTIL/VENTOLIN HFA) 90 mcg/actuation inhaler USE 2 INHALATIONS EVERY 4 TO 6 HOURS AS NEEDED FOR SHORTNESS OF BREATH    allopurinoL (ZYLOPRIM) 300 MG tablet Take 1 tablet (300 mg total) by mouth once daily.    amLODIPine (NORVASC) 10 MG tablet Take 1 tablet (10 mg total) by mouth once daily.    aspirin 325 MG tablet Take 325 mg by mouth once daily.    cetirizine (ZYRTEC) 10 MG tablet Take 10 mg by mouth once daily.    EScitalopram oxalate (LEXAPRO) 20 MG tablet Take 1 tablet (20 mg total) by mouth once daily.    fluticasone propionate (FLONASE) 50 mcg/actuation nasal spray 1 spray by Each Nare route once daily.    gabapentin (NEURONTIN) 300 MG capsule Take 1 capsule (300 mg total) by mouth every evening.    ibuprofen (ADVIL,MOTRIN) 600 MG tablet Take 1 tablet (600 mg total) by mouth 3 (three) times daily as needed for Pain.     multivitamin (THERAGRAN) per tablet Take 1 tablet by mouth once daily.    omeprazole (PRILOSEC) 40 MG capsule Take 1 capsule (40 mg total) by mouth once daily.    ondansetron (ZOFRAN-ODT) 4 MG TbDL Take 1 tablet (4 mg total) by mouth every 6 (six) hours as needed.    oxyCODONE-acetaminophen (PERCOCET) 5-325 mg per tablet Take 1 tablet by mouth every 6 (six) hours as needed for Pain.    progesterone (PROMETRIUM) 200 MG capsule Take 1 capsule by mouth once daily.    rosuvastatin (CRESTOR) 10 MG tablet Take 1 tablet (10 mg total) by mouth once daily.    temazepam (RESTORIL) 30 mg capsule Take 1 capsule (30 mg total) by mouth nightly as needed for Insomnia.    valsartan-hydrochlorothiazide (DIOVAN-HCT) 320-25 mg per tablet Take 1 tablet by mouth once daily.     No current facility-administered medications for this visit.     Facility-Administered Medications Ordered in Other Visits   Medication    fentaNYL 50 mcg/mL injection 25 mcg    fentaNYL 50 mcg/mL injection 25 mcg    HYDROmorphone (PF) injection 0.2 mg    lactated ringers infusion    LIDOcaine (PF) 10 mg/ml (1%) injection 10 mg    midazolam (VERSED) 1 mg/mL injection 0.5 mg    oxyCODONE immediate release tablet 5 mg       Review of patient's allergies indicates:  No Known Allergies    Family History   Problem Relation Age of Onset    Hypertension Mother     Coronary artery disease Mother     Lung cancer Father     Hypertension Sister        Social History     Socioeconomic History    Marital status:    Occupational History    Occupation:    Tobacco Use    Smoking status: Former     Current packs/day: 0.00     Average packs/day: 0.3 packs/day for 18.0 years (4.5 ttl pk-yrs)     Types: Cigarettes     Start date:      Quit date: 2000     Years since quittin.8    Smokeless tobacco: Never   Substance and Sexual Activity    Alcohol use: Not Currently     Comment: occasional    Drug use: No    Sexual activity: Yes     Partners: Male      Birth control/protection: None     Comment:    Social History Narrative    Live with      Social Determinants of Health     Financial Resource Strain: Low Risk  (6/7/2022)    Overall Financial Resource Strain (CARDIA)     Difficulty of Paying Living Expenses: Not hard at all   Food Insecurity: No Food Insecurity (6/7/2022)    Hunger Vital Sign     Worried About Running Out of Food in the Last Year: Never true     Ran Out of Food in the Last Year: Never true   Transportation Needs: No Transportation Needs (6/7/2022)    PRAPARE - Transportation     Lack of Transportation (Medical): No     Lack of Transportation (Non-Medical): No   Physical Activity: Insufficiently Active (6/7/2022)    Exercise Vital Sign     Days of Exercise per Week: 4 days     Minutes of Exercise per Session: 30 min   Stress: Stress Concern Present (6/7/2022)    [employer] - Occupational Stress Questionnaire     Feeling of Stress : Very much   Social Connections: Unknown (6/7/2022)    Social Connection and Isolation Panel [NHANES]     Frequency of Communication with Friends and Family: More than three times a week     Frequency of Social Gatherings with Friends and Family: Patient refused     Active Member of Clubs or Organizations: Yes     Attends Club or Organization Meetings: Patient refused     Marital Status:    Housing Stability: Low Risk  (6/7/2022)    Housing Stability Vital Sign     Unable to Pay for Housing in the Last Year: No     Number of Places Lived in the Last Year: 1     Unstable Housing in the Last Year: No       Chief Complaint:   Chief Complaint   Patient presents with    Post-op Evaluation       Date of surgery:  September 26, 2023    History of present illness:  68-year-old female underwent right total knee arthroplasty.  She is doing pretty well.  Pain is 6/10.  PT is painful.  Has a lot of burning pain in the posterior and lateral aspect of her leg.  Patient was on gabapentin  prior to the surgery.  She continues to take it.  She takes ibuprofen at night.    Review of Systems:    Musculoskeletal:  See HPI        Physical Examination:    Vital Signs:    Vitals:    11/06/23 0810   Resp: 18       Body mass index is 27.46 kg/m².    This a well-developed, well nourished patient in no acute distress.  They are alert and oriented and cooperative to examination.  Pt. walks without an antalgic gait.      Examination of the right knee shows healed surgical incision.  No erythema drainage.  Patient has range of motion from 2° to 120°.  A little irritability.  Stable to varus and valgus stress.  Negative drawer exam.    X-rays:  Two views of the right knee are  reviewed which show well-aligned right total knee arthroplasty components without complication     Assessment::  Status post right total knee arthroplasty    Plan:  I think she is doing well.  Would continue the outpatient physical therapy.  I would add a 2nd dose ibuprofen to help with her soreness and irritability.  Continue with the gabapentin.  Follow up in 6 weeks with four views of both knees.    This note was created using M [employer] voice recognition software that occasionally misinterpreted phrases or words.

## 2023-11-07 ENCOUNTER — CLINICAL SUPPORT (OUTPATIENT)
Dept: REHABILITATION | Facility: HOSPITAL | Age: 68
End: 2023-11-07
Payer: COMMERCIAL

## 2023-11-07 DIAGNOSIS — R26.9 GAIT DISTURBANCE: ICD-10-CM

## 2023-11-07 DIAGNOSIS — M25.661 DECREASED ROM OF RIGHT KNEE: ICD-10-CM

## 2023-11-07 DIAGNOSIS — Z74.09 IMPAIRED FUNCTIONAL MOBILITY AND ACTIVITY TOLERANCE: Primary | ICD-10-CM

## 2023-11-07 PROCEDURE — 97110 THERAPEUTIC EXERCISES: CPT | Mod: PN

## 2023-11-07 NOTE — PROGRESS NOTES
"[facility]   Physical Therapy Treatment Note     Name: Kayla Justin [facility] Number: [id number]    Therapy Diagnosis:   Encounter Diagnoses   Name Primary?    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 54.8/100 Lower Score = Greater Disability               Next survey due session 10     Precautions: Weightbearing as tolerated    PTA Visit #: 0/5     Time In: 1248  Time Out: 1349  Total Billable Time: 55 minutes    SUBJECTIVE     Pt reports: "I went back to work yesterday.  It's still swelling a lot."  She was compliant with home exercise program.  Response to previous treatment: "Okay. When I leave here I'm usually hurting."  Functional change: Returned to work yesterday    Pain: 4/10  Location: right knee     OBJECTIVE     Objective Measures updated at progress report unless specified.    Treatment     Kayla received the treatments listed below:      therapeutic exercises to develop strength, endurance, ROM, and flexibility for 53 minutes including:  Recumbent bike Lvl4 w/seat at lowest setting x10'  (transition to Treadmill)  Standing gastroc stretches 3x30s   Soleus stretch 3x30s   March on R using 4# x20   Hip extension on R using 4# x20   Hamstring curl on R using 4# x20    Hip flexion SLR on R using 4# x20   Mini squat x20   Alt. toe taps on 8" step while standing on airex using 4# x2'   R TKE using green " "theraband x20   R closed chain TKE on 6" step 2x10   Step up & over 8" step using R as power w/4# x15 ea way   Lateral step up & over 8" step w/4# x15 ea way   Shuttle leg press x20 ea   Bilateral with 62#   R only with 37#  Seated hamstring stretch 3x30s on R   AROM heel slides using 4# x20   LAQ using 4# x20    March using 4# x20   Supine R hip flexor stretch 3x30s  R hip flexion SLR 4#x20   R heel slides using 4# x20 AROM & x20 AAROM using strap (0-125*)   R SAQ using 4# x20   Hooklying bridging x20  Passive extension 3x15 sec  Instructed patient in prone hang and prone quad sets    Did not perform on this date: (Can continue & progress)  Supine R Quad sets x20  Prone R hip extension 2# 2x10    R hamstring curl 2# 2x10    To be added: advance resistance as tolerated.     manual therapy techniques: Friction Massage and Kinesiology were applied to the: Incision on Right Knee for 2 minutes, including:   Patellar mobilization  Cross friction massage to incision    Patient Education and Home Exercises     Home Exercises Provided and Patient Education Provided     Education provided:   - The patient was instructed in prone hang using light weight at ankle and towel roll at distal thigh, prone quad set with towel roll at ankle.     Written Home Exercises Provided: Patient was instructed to continue prior HEP, including prone stretch into knee extension. Exercises were reviewed and Kayla was able to demonstrate them prior to the end of the session. Kayla demonstrated good  understanding of the education provided. See EMR under Patient Instructions for exercises provided during therapy sessions.    ASSESSMENT     The patient presents to PT with reports of intermittent knee buckling when walking.  Described as "does not feel stable".  Discussed the importance of achieving full knee extension in closed chain to facilitate improved knee stability when walking/standing.  Patient verbalized understanding.  She demonstrates " difficulty achieving/maintaining terminal knee extension in closed chain, particularly with step up/over as she tends to move quickly between positions thus not fully achieving terminal knee extension unless provided frequent verbal cues.  ROM in flexion near full range.     Kayla Is progressing well towards her goals.   Pt prognosis is Good.     Pt will continue to benefit from skilled outpatient physical therapy to address the deficits listed in the problem list box on initial evaluation, provide pt/family education and to maximize pt's level of independence in the home and community environment.     Pt's spiritual, cultural and educational needs considered and pt agreeable to plan of care and goals.     Anticipated barriers to physical therapy: None    Goals:  Short Term Goals: 3 weeks   1) Decrease swelling by 1 cm at each affected level Progressing  2) Improve AROM R knee to 5* - 115* to assist with transfers Met 11/7/2023  3) Patient will demonstrate equal weight bearing during sit <> stand transfers 8 of 10 trials Progressing  4) Patient will perform car transfer without UE assist for LE management 7 of 10 trials Progressing/nearly met     Long Term Goals: 6 weeks   1) Patient will consistently sleep through the night without interruption by knee pain Minimal progress  2) Patient will consistently ambulate over level surfaces demonstrating appropriate foot clearance with adequate knee flexion, consistent R heel strike/toe off, equal weight bearing/stance time Progressing  3) Patient will navigate a flight of steps utilizing reciprocating gait pattern and minimal UE support Progressing  4) Patient will resume her regular household chores Progressing  5) Patient will resume light work activities. Met 11/7/2023  6) Improve functional score to > 70% as indicated by FOTO knee survey Progressing  7) Patient will be independent in HEP. Progressing    PLAN     POC: Outpatient Physical Therapy 3 times weekly for 6  weeks to include the following interventions: Electrical Stimulation NMES, Gait Training, Manual Therapy, Moist Heat/ Ice, Neuromuscular Re-ed, Patient Education, Therapeutic Activities, Therapeutic Exercise, and Therapeutic Taping. Kayla may at times be seen by a PTA as part of the Rehab Team.    The patient is to be progressed within the established plan of care as tolerated in order to accomplish goals as stated above and increase overall function post-operatively. Transition from recumbent bike to treadmill, incorporating backward and sideways walking to further enhance extension and stability.      Karey Mccrary, PT

## 2023-11-10 ENCOUNTER — CLINICAL SUPPORT (OUTPATIENT)
Dept: REHABILITATION | Facility: HOSPITAL | Age: 68
End: 2023-11-10
Payer: COMMERCIAL

## 2023-11-10 DIAGNOSIS — R26.9 GAIT DISTURBANCE: ICD-10-CM

## 2023-11-10 DIAGNOSIS — M25.661 DECREASED ROM OF RIGHT KNEE: ICD-10-CM

## 2023-11-10 DIAGNOSIS — Z74.09 IMPAIRED FUNCTIONAL MOBILITY AND ACTIVITY TOLERANCE: Primary | ICD-10-CM

## 2023-11-10 PROCEDURE — 97110 THERAPEUTIC EXERCISES: CPT | Mod: PN,CQ

## 2023-11-10 NOTE — PROGRESS NOTES
"[facility]   Physical Therapy Treatment Note     Name: Kayla Justin [facility] Number: [id number]    Therapy Diagnosis:   Encounter Diagnoses   Name Primary?    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 54.8/100 Lower Score = Greater Disability               Next survey due session 10     Precautions: Weightbearing as tolerated    PTA Visit #: 1/5     Time In: 0802  Time Out: 0857  Total Billable Time: 55 minutes    SUBJECTIVE     Pt reports: "I have a hard time sleeping, trying to find a comfortable position. It is worst at night; I can manage during the day. At work I can't sit with my legs directly underneath me; I have to angle them out some. That feels odd. I have been up since 5 this morning, so I took an Advil. It isn't that bad." "Why does my knee crackle when I wiggle my leg?"-sitting performing hip add/abd in closed chain.  She was compliant with home exercise program.  Response to previous treatment: "It was okay. I got sore later that day."  Functional change: Returned to work yesterday    Pain: 4/10  Location: right knee     OBJECTIVE     Objective Measures updated at progress report unless specified.    Treatment     Kayla received the treatments listed below:      therapeutic exercises to develop strength, endurance, ROM, and flexibility for 53 minutes including:  Recumbent bike Lvl4 w/seat at lowest " "setting x8'  (transition to Treadmill)  Standing gastroc stretches 3x30s   Soleus stretch 3x30s   March on R using 4# x20   Hip extension on R using 4# x20   Hamstring curl on R using 4# x20    Hip flexion SLR on R using 4# x20   Mini squat x20   Alt. toe taps on 8" step while standing on airex using 4# x2'   R TKE using blue theraband x20   R closed chain TKE on 8" step x20   Step up & over 8" step using R as power w/4# x15 ea way   Lateral step up & over 8" step w/4# x15 ea way   Shuttle leg press x20 ea   Bilateral with 62#   R only with 37#  Seated hamstring stretch 3x30s on R   AROM heel slides using 4# x20   LAQ using 4# x20    March using 4# x20   Supine R hip flexor stretch 3x30s  R hip flexion SLR 4# x20   R heel slides using 4# x20 AROM & x20 AAROM using strap (0-122*)   R SAQ using 4# x20   Hooklying bridging x20  Prone quad sets x15 (increase)    Did not perform on this date: (Can continue & progress)  Supine R Quad sets x20  *Prone R hip extension 2# 2x10 (can transition from standing)   R hamstring curl 2# 2x10    To be added: advance resistance as tolerated.     manual therapy techniques: Friction Massage and Kinesiology were applied to the: Incision on Right Knee for 2 minutes, including:   Cross friction massage to incision  Kinesiology tape applied using I strip anchored distal to incision without tension, ~15% tension placed in oscillating pattern, anchored proximal to incision to address scar formation and mobility    *Patellar mobilization    Patient Education and Home Exercises     Home Exercises Provided and Patient Education Provided     Education provided:   - The patient was instructed in slight increase in exercise complexity as stated above in bold print.    Written Home Exercises Provided: Patient was instructed to continue prior HEP, including prone stretch into knee extension. Exercises were reviewed and Kayla was able to demonstrate them prior to the end of the session. Kayla " demonstrated good  understanding of the education provided. See EMR under Patient Instructions for exercises provided during therapy sessions.    ASSESSMENT     The patient reported to physical therapy with continued knee pain, worse at night. Kayla Piepr was slightly progressed with increased exercise complexity as stated above focusing on knee range of motion, strength, stability, and control. Patient reports crepitus in affected knee while performing sitting closed chain hip adduction and abduction motions. Edema minimally present. Scar mobility minimally restricted distally, and patient requested application of Kinesiology tape. Patient has been performing self scar mobilizations.    Kayla Is progressing well towards her goals.   Pt prognosis is Good.     Pt will continue to benefit from skilled outpatient physical therapy to address the deficits listed in the problem list box on initial evaluation, provide pt/family education and to maximize pt's level of independence in the home and community environment.     Pt's spiritual, cultural and educational needs considered and pt agreeable to plan of care and goals.     Anticipated barriers to physical therapy: None    Goals:  Short Term Goals: 3 weeks   1) Decrease swelling by 1 cm at each affected level Progressing  2) Improve AROM R knee to 5* - 115* to assist with transfers Met 11/7/2023  3) Patient will demonstrate equal weight bearing during sit <> stand transfers 8 of 10 trials Progressing  4) Patient will perform car transfer without UE assist for LE management 7 of 10 trials Progressing/nearly met     Long Term Goals: 6 weeks   1) Patient will consistently sleep through the night without interruption by knee pain Minimal progress  2) Patient will consistently ambulate over level surfaces demonstrating appropriate foot clearance with adequate knee flexion, consistent R heel strike/toe off, equal weight bearing/stance time Progressing  3) Patient will  navigate a flight of steps utilizing reciprocating gait pattern and minimal UE support Progressing  4) Patient will resume her regular household chores Progressing  5) Patient will resume light work activities. Met 11/7/2023  6) Improve functional score to > 70% as indicated by FOTO knee survey Progressing  7) Patient will be independent in HEP. Progressing    PLAN     POC: Outpatient Physical Therapy 3 times weekly for 6 weeks to include the following interventions: Electrical Stimulation NMES, Gait Training, Manual Therapy, Moist Heat/ Ice, Neuromuscular Re-ed, Patient Education, Therapeutic Activities, Therapeutic Exercise, and Therapeutic Taping. Kayla may at times be seen by a PTA as part of the Rehab Team.    The patient is to be progressed within the established plan of care as tolerated in order to accomplish goals as stated above and increase overall function post-operatively. Transition from recumbent bike to treadmill, incorporating backward and sideways walking to further enhance knee extension and stability. Increase ankle cuff weight in subsequent session.    Federica Washington, PTA

## 2023-11-13 ENCOUNTER — PATIENT MESSAGE (OUTPATIENT)
Dept: ADMINISTRATIVE | Facility: HOSPITAL | Age: 68
End: 2023-11-13
Payer: COMMERCIAL

## 2023-11-20 ENCOUNTER — PATIENT OUTREACH (OUTPATIENT)
Dept: ADMINISTRATIVE | Facility: HOSPITAL | Age: 68
End: 2023-11-20
Payer: COMMERCIAL

## 2023-11-20 NOTE — PROGRESS NOTES
Population Health Chart Review & Patient Outreach Details    Outreach Performed: YES Telephone Not Successful    Additional Pop Health Notes:           Updates Requested / Reviewed:      Updated Care Coordination Note         Health Maintenance Topics Overdue:    Health Maintenance Due   Topic Date Due    DEXA Scan  Never done    RSV Vaccine (Age 60+) (1 - 1-dose 60+ series) Never done    Colorectal Cancer Screening  10/31/2021    Hemoglobin A1c (Prediabetes)  07/05/2023    Mammogram  12/30/2023         Health Maintenance Topic(s) Outreach Outcomes & Actions Taken:    Osteoporosis Screening - Outreach Outcomes & Actions Taken  : Dexa Order Placed

## 2023-12-05 DIAGNOSIS — Z96.651 STATUS POST TOTAL PROSTHETIC REPLACEMENT OF KNEE JOINT USING CEMENT, RIGHT: Primary | ICD-10-CM

## 2023-12-18 ENCOUNTER — HOSPITAL ENCOUNTER (OUTPATIENT)
Dept: RADIOLOGY | Facility: HOSPITAL | Age: 68
Discharge: HOME | End: 2023-12-18
Attending: ORTHOPAEDIC SURGERY
Payer: COMMERCIAL

## 2023-12-18 ENCOUNTER — OFFICE VISIT (OUTPATIENT)
Dept: ORTHOPEDICS | Facility: CLINIC | Age: 68
End: 2023-12-18
Payer: COMMERCIAL

## 2023-12-18 VITALS — BODY MASS INDEX: 27.31 KG/M2 | RESPIRATION RATE: 18 BRPM | WEIGHT: 160 LBS | HEIGHT: 64 IN

## 2023-12-18 DIAGNOSIS — Z96.651 STATUS POST TOTAL PROSTHETIC REPLACEMENT OF KNEE JOINT USING CEMENT, RIGHT: ICD-10-CM

## 2023-12-18 DIAGNOSIS — Z96.651 STATUS POST TOTAL PROSTHETIC REPLACEMENT OF KNEE JOINT USING CEMENT, RIGHT: Primary | ICD-10-CM

## 2023-12-18 PROCEDURE — 73564 X-RAY EXAM KNEE 4 OR MORE: CPT | Mod: 26,50,, | Performed by: RADIOLOGY

## 2023-12-18 PROCEDURE — 99024 PR POST-OP FOLLOW-UP VISIT: ICD-10-PCS | Mod: S$GLB,,, | Performed by: ORTHOPAEDIC SURGERY

## 2023-12-18 PROCEDURE — 73564 XR KNEE ORTHO BILAT WITH FLEXION: ICD-10-PCS | Mod: 26,50,, | Performed by: RADIOLOGY

## 2023-12-18 PROCEDURE — 99024 POSTOP FOLLOW-UP VISIT: CPT | Mod: S$GLB,,, | Performed by: ORTHOPAEDIC SURGERY

## 2023-12-18 PROCEDURE — 73564 X-RAY EXAM KNEE 4 OR MORE: CPT | Mod: TC,50,PO

## 2023-12-18 PROCEDURE — 99999 PR PBB SHADOW E&M-EST. PATIENT-LVL IV: ICD-10-PCS | Mod: PBBFAC,,, | Performed by: ORTHOPAEDIC SURGERY

## 2023-12-18 PROCEDURE — 99999 PR PBB SHADOW E&M-EST. PATIENT-LVL IV: CPT | Mod: PBBFAC,,, | Performed by: ORTHOPAEDIC SURGERY

## 2023-12-18 NOTE — PROGRESS NOTES
Past Medical History:   Diagnosis Date    Carotid stenosis, right     DDD (degenerative disc disease), cervical     Depression     Hyperkalemia     Hypertension     Insomnia     Rhinitis     Synovial cyst of popliteal space        Past Surgical History:   Procedure Laterality Date    ANTERIOR CERVICAL DISCECTOMY W/ FUSION      APPENDECTOMY      CAROTID ENDARTERECTOMY Right 10/03/2018    COLONOSCOPY  2013    ESOPHAGOGASTRODUODENOSCOPY N/A 10/23/2023    Procedure: EGD (ESOPHAGOGASTRODUODENOSCOPY);  Surgeon: Martin Carvalho MD;  Location: [facility] ENDO;  Service: Endoscopy;  Laterality: N/A;    KNEE SURGERY Right     REPAIR MENISCUS SCOPE    ROBOTIC ARTHROPLASTY, KNEE Right 9/26/2023    Procedure: ROBOTIC ARTHROPLASTY, KNEE, TOTAL;  Surgeon: Mao Kearney MD;  Location: [facility] OR;  Service: Orthopedics;  Laterality: Right;    TUBAL LIGATION  2000    UMBILICAL HERNIA REPAIR  12/14/2022       Current Outpatient Medications   Medication Sig    acetaminophen (TYLENOL) 500 MG tablet Take 2 tablets (1,000 mg total) by mouth every 8 (eight) hours as needed for Pain.    albuterol (PROVENTIL/VENTOLIN HFA) 90 mcg/actuation inhaler USE 2 INHALATIONS EVERY 4 TO 6 HOURS AS NEEDED FOR SHORTNESS OF BREATH    allopurinoL (ZYLOPRIM) 300 MG tablet Take 1 tablet (300 mg total) by mouth once daily.    amLODIPine (NORVASC) 10 MG tablet Take 1 tablet (10 mg total) by mouth once daily.    aspirin 325 MG tablet Take 325 mg by mouth once daily.    cetirizine (ZYRTEC) 10 MG tablet Take 10 mg by mouth once daily.    EScitalopram oxalate (LEXAPRO) 20 MG tablet Take 1 tablet (20 mg total) by mouth once daily.    fluticasone propionate (FLONASE) 50 mcg/actuation nasal spray 1 spray by Each Nare route once daily.    gabapentin (NEURONTIN) 300 MG capsule Take 1 capsule (300 mg total) by mouth every evening.    ibuprofen (ADVIL,MOTRIN) 600 MG tablet Take 1 tablet (600 mg total) by mouth 3 (three) times daily as needed for Pain.     multivitamin (THERAGRAN) per tablet Take 1 tablet by mouth once daily.    omeprazole (PRILOSEC) 40 MG capsule Take 1 capsule (40 mg total) by mouth once daily.    ondansetron (ZOFRAN-ODT) 4 MG TbDL Take 1 tablet (4 mg total) by mouth every 6 (six) hours as needed.    oxyCODONE-acetaminophen (PERCOCET) 5-325 mg per tablet Take 1 tablet by mouth every 6 (six) hours as needed for Pain.    progesterone (PROMETRIUM) 200 MG capsule Take 1 capsule by mouth once daily.    rosuvastatin (CRESTOR) 10 MG tablet Take 1 tablet (10 mg total) by mouth once daily.    temazepam (RESTORIL) 30 mg capsule Take 1 capsule (30 mg total) by mouth nightly as needed for Insomnia.    valsartan-hydrochlorothiazide (DIOVAN-HCT) 320-25 mg per tablet Take 1 tablet by mouth once daily.     No current facility-administered medications for this visit.     Facility-Administered Medications Ordered in Other Visits   Medication    fentaNYL 50 mcg/mL injection 25 mcg    fentaNYL 50 mcg/mL injection 25 mcg    HYDROmorphone (PF) injection 0.2 mg    lactated ringers infusion    LIDOcaine (PF) 10 mg/ml (1%) injection 10 mg    midazolam (VERSED) 1 mg/mL injection 0.5 mg    oxyCODONE immediate release tablet 5 mg       Review of patient's allergies indicates:  No Known Allergies    Family History   Problem Relation Age of Onset    Hypertension Mother     Coronary artery disease Mother     Lung cancer Father     Hypertension Sister        Social History     Socioeconomic History    Marital status:    Occupational History    Occupation:    Tobacco Use    Smoking status: Former     Current packs/day: 0.00     Average packs/day: 0.3 packs/day for 18.0 years (4.5 ttl pk-yrs)     Types: Cigarettes     Start date:      Quit date: 2000     Years since quittin.9    Smokeless tobacco: Never   Substance and Sexual Activity    Alcohol use: Not Currently     Comment: occasional    Drug use: No    Sexual activity: Yes     Partners: Male      Birth control/protection: None     Comment:    Social History Narrative    Live with      Social Determinants of Health     Financial Resource Strain: Low Risk  (6/7/2022)    Overall Financial Resource Strain (CARDIA)     Difficulty of Paying Living Expenses: Not hard at all   Food Insecurity: No Food Insecurity (6/7/2022)    Hunger Vital Sign     Worried About Running Out of Food in the Last Year: Never true     Ran Out of Food in the Last Year: Never true   Transportation Needs: No Transportation Needs (6/7/2022)    PRAPARE - Transportation     Lack of Transportation (Medical): No     Lack of Transportation (Non-Medical): No   Physical Activity: Insufficiently Active (6/7/2022)    Exercise Vital Sign     Days of Exercise per Week: 4 days     Minutes of Exercise per Session: 30 min   Stress: Stress Concern Present (6/7/2022)    [employer] - Occupational Stress Questionnaire     Feeling of Stress : Very much   Social Connections: Unknown (6/7/2022)    Social Connection and Isolation Panel [NHANES]     Frequency of Communication with Friends and Family: More than three times a week     Frequency of Social Gatherings with Friends and Family: Patient declined     Active Member of Clubs or Organizations: Yes     Attends Club or Organization Meetings: Patient declined     Marital Status:    Housing Stability: Low Risk  (6/7/2022)    Housing Stability Vital Sign     Unable to Pay for Housing in the Last Year: No     Number of Places Lived in the Last Year: 1     Unstable Housing in the Last Year: No       Chief Complaint:   No chief complaint on file.      Date of surgery:  September 26, 2023    History of present illness:  68-year-old female underwent right total knee arthroplasty.  She is doing pretty well.  Pain is 7/10.  Has a lot of burning pain in the posterior and lateral aspect of her leg.  Patient was on gabapentin prior to the surgery.  She continues to take it.   She takes ibuprofen at night.  A lot of tightness in the back of her knee and difficulty getting it straight.    Review of Systems:    Musculoskeletal:  See HPI        Physical Examination:    Vital Signs:    There were no vitals filed for this visit.      There is no height or weight on file to calculate BMI.    This a well-developed, well nourished patient in no acute distress.  They are alert and oriented and cooperative to examination.  Pt. walks without an antalgic gait.      Examination of the right knee shows healed surgical incision.  No erythema drainage.  Patient has range of motion from 2° to 120°.  A little irritability and tightness with trying to obtain full extension.  Pain along the hamstring tendons in the back.  Stable to varus and valgus stress.  Negative drawer exam.    X-rays:  4 views of the right knee are ordered and reviewed which show well-aligned right total knee arthroplasty components without complication     Assessment::  Status post right total knee arthroplasty    Plan:  I think she is doing well.  I recommended that we continue the outpatient physical therapy with a focus on full extension as well as hamstring tendons.  Recommend Tylenol p.m. at night to maybe help with her night pain.  Would discuss with her PCP about possibly alternating the dose of gabapentin or possible use of Lyrica instead.  Follow up in 2 months.    This note was created using M [employer] voice recognition software that occasionally misinterpreted phrases or words.

## 2023-12-19 ENCOUNTER — ANESTHESIA (OUTPATIENT)
Dept: ENDOSCOPY | Facility: HOSPITAL | Age: 68
End: 2023-12-19
Payer: COMMERCIAL

## 2023-12-19 ENCOUNTER — ANESTHESIA EVENT (OUTPATIENT)
Dept: ENDOSCOPY | Facility: HOSPITAL | Age: 68
End: 2023-12-19
Payer: COMMERCIAL

## 2023-12-19 ENCOUNTER — HOSPITAL ENCOUNTER (OUTPATIENT)
Facility: HOSPITAL | Age: 68
Discharge: HOME | End: 2023-12-19
Attending: INTERNAL MEDICINE | Admitting: INTERNAL MEDICINE
Payer: COMMERCIAL

## 2023-12-19 DIAGNOSIS — Z12.11 SCREENING FOR MALIGNANT NEOPLASM OF COLON: ICD-10-CM

## 2023-12-19 DIAGNOSIS — K63.5 POLYP OF COLON, UNSPECIFIED PART OF COLON, UNSPECIFIED TYPE: Primary | ICD-10-CM

## 2023-12-19 DIAGNOSIS — K64.8 INTERNAL HEMORRHOIDS: ICD-10-CM

## 2023-12-19 PROCEDURE — 37000008 HC ANESTHESIA 1ST 15 MINUTES: Performed by: INTERNAL MEDICINE

## 2023-12-19 PROCEDURE — 27201012 HC FORCEPS, HOT/COLD, DISP: Performed by: INTERNAL MEDICINE

## 2023-12-19 PROCEDURE — 27201089 HC SNARE, DISP (ANY): Performed by: INTERNAL MEDICINE

## 2023-12-19 PROCEDURE — 45385 PR COLONOSCOPY,REMV LESN,SNARE: ICD-10-PCS | Mod: 33,22,, | Performed by: INTERNAL MEDICINE

## 2023-12-19 PROCEDURE — D9220A PRA ANESTHESIA: ICD-10-PCS | Mod: 33,CRNA,, | Performed by: STUDENT IN AN ORGANIZED HEALTH CARE EDUCATION/TRAINING PROGRAM

## 2023-12-19 PROCEDURE — 63600175 PHARM REV CODE 636 W HCPCS: Performed by: STUDENT IN AN ORGANIZED HEALTH CARE EDUCATION/TRAINING PROGRAM

## 2023-12-19 PROCEDURE — D9220A PRA ANESTHESIA: Mod: 33,CRNA,, | Performed by: STUDENT IN AN ORGANIZED HEALTH CARE EDUCATION/TRAINING PROGRAM

## 2023-12-19 PROCEDURE — 45380 COLONOSCOPY AND BIOPSY: CPT | Mod: PT,59 | Performed by: INTERNAL MEDICINE

## 2023-12-19 PROCEDURE — 37000009 HC ANESTHESIA EA ADD 15 MINS: Performed by: INTERNAL MEDICINE

## 2023-12-19 PROCEDURE — 25000003 PHARM REV CODE 250: Performed by: INTERNAL MEDICINE

## 2023-12-19 PROCEDURE — 45380 PR COLONOSCOPY,BIOPSY: ICD-10-PCS | Mod: 33,22,59, | Performed by: INTERNAL MEDICINE

## 2023-12-19 PROCEDURE — 45385 COLONOSCOPY W/LESION REMOVAL: CPT | Mod: 33,22,, | Performed by: INTERNAL MEDICINE

## 2023-12-19 PROCEDURE — 45385 COLONOSCOPY W/LESION REMOVAL: CPT | Mod: PT | Performed by: INTERNAL MEDICINE

## 2023-12-19 PROCEDURE — 25000003 PHARM REV CODE 250: Performed by: STUDENT IN AN ORGANIZED HEALTH CARE EDUCATION/TRAINING PROGRAM

## 2023-12-19 PROCEDURE — 45380 COLONOSCOPY AND BIOPSY: CPT | Mod: 33,22,59, | Performed by: INTERNAL MEDICINE

## 2023-12-19 PROCEDURE — D9220A PRA ANESTHESIA: ICD-10-PCS | Mod: 33,ANES,, | Performed by: ANESTHESIOLOGY

## 2023-12-19 PROCEDURE — D9220A PRA ANESTHESIA: Mod: 33,ANES,, | Performed by: ANESTHESIOLOGY

## 2023-12-19 RX ORDER — SODIUM CHLORIDE 9 MG/ML
INJECTION, SOLUTION INTRAVENOUS CONTINUOUS
Status: DISCONTINUED | OUTPATIENT
Start: 2023-12-19 | End: 2023-12-19 | Stop reason: HOSPADM

## 2023-12-19 RX ORDER — PROPOFOL 10 MG/ML
VIAL (ML) INTRAVENOUS
Status: DISCONTINUED | OUTPATIENT
Start: 2023-12-19 | End: 2023-12-19

## 2023-12-19 RX ORDER — LIDOCAINE HYDROCHLORIDE 20 MG/ML
INJECTION INTRAVENOUS
Status: DISCONTINUED | OUTPATIENT
Start: 2023-12-19 | End: 2023-12-19

## 2023-12-19 RX ADMIN — LIDOCAINE HYDROCHLORIDE 50 MG: 20 INJECTION, SOLUTION INTRAVENOUS at 12:12

## 2023-12-19 RX ADMIN — PROPOFOL 50 MG: 10 INJECTION, EMULSION INTRAVENOUS at 12:12

## 2023-12-19 RX ADMIN — SODIUM CHLORIDE: 9 INJECTION, SOLUTION INTRAVENOUS at 11:12

## 2023-12-19 NOTE — PROVATION PATIENT INSTRUCTIONS
Discharge Summary/Instructions after an Endoscopic Procedure  Patient Name: Kayla Piper  Patient MRN: [id number]  Patient YOB: 1955 Tuesday, December 19, 2023  Martin Haas MD  Dear patient,  As a result of recent federal legislation (The Federal Cures Act), you may   receive lab or pathology results from your procedure in your [facility]   account before your physician is able to contact you. Your physician or   their representative will relay the results to you with their   recommendations at their soonest availability.  Thank you,  RESTRICTIONS:  During your procedure today, you received medications for sedation.  These   medications may affect your judgment, balance and coordination.  Therefore,   for 24 hours, you have the following restrictions:   - DO NOT drive a car, operate machinery, make legal/financial decisions,   sign important papers or drink alcohol.    ACTIVITY:  Today: no heavy lifting, straining or running due to procedural   sedation/anesthesia.  The following day: return to full activity including work.  DIET:  Eat and drink normally unless instructed otherwise.     TREATMENT FOR COMMON SIDE EFFECTS:  - Mild abdominal pain, nausea, belching, bloating or excessive gas:  rest,   eat lightly and use a heating pad.  - Sore Throat: treat with throat lozenges and/or gargle with warm salt   water.  - Because air was used during the procedure, expelling large amounts of air   from your rectum or belching is normal.  - If a bowel prep was taken, you may not have a bowel movement for 1-3 days.    This is normal.  SYMPTOMS TO WATCH FOR AND REPORT TO YOUR PHYSICIAN:  1. Abdominal pain or bloating, other than gas cramps.  2. Chest pain.  3. Back pain.  4. Signs of infection such as: chills or fever occurring within 24 hours   after the procedure.  5. Rectal bleeding, which would show as bright red, maroon, or black stools.   (A tablespoon of blood from the rectum is not serious, especially  if   hemorrhoids are present.)  6. Vomiting.  7. Weakness or dizziness.  GO DIRECTLY TO THE NEAREST EMERGENCY ROOM IF YOU HAVE ANY OF THE FOLLOWING:      Difficulty breathing              Chills and/or fever over 101 F   Persistent vomiting and/or vomiting blood   Severe abdominal pain   Severe chest pain   Black, tarry stools   Bleeding- more than one tablespoon   Any other symptom or condition that you feel may need urgent attention  Your doctor recommends these additional instructions:  If any biopsies were taken, your doctors clinic will contact you in 1 to 2   weeks with any results.  - Patient has a contact number available for emergencies.  The signs and   symptoms of potential delayed complications were discussed with the   patient.  Return to normal activities tomorrow.  Written discharge   instructions were provided to the patient.   - High fiber diet.   - Continue present medications.   - Await pathology results.   - Repeat colonoscopy in 3 years for surveillance.   - Discharge patient to home (ambulatory).   - Return to GI office PRN.  For questions, problems or results please call your physician - Martin Haas MD at Work:  [phone].  [facility] RO[facility] PHONE NUMBER: [phone]  IF A COMPLICATION OR EMERGENCY SITUATION ARISES AND YOU ARE UNABLE TO REACH   YOUR PHYSICIAN - GO DIRECTLY TO THE EMERGENCY ROOM.  Martin Haas MD  12/19/2023 12:51:03 PM  This report has been verified and signed electronically.  Dear patient,  As a result of recent federal legislation (The Federal Cures Act), you may   receive lab or pathology results from your procedure in your [facility]   account before your physician is able to contact you. Your physician or   their representative will relay the results to you with their   recommendations at their soonest availability.  Thank you,  PROVATION

## 2023-12-19 NOTE — TRANSFER OF CARE
Anesthesia Transfer of Care Note    Patient: Kayla Piper    Procedure(s) Performed: Procedure(s) (LRB):  COLONOSCOPY (N/A)    Patient location: GI    Anesthesia Type: general    Transport from OR: Transported from OR on room air with adequate spontaneous ventilation    Post pain: adequate analgesia    Post assessment: no apparent anesthetic complications    Post vital signs: stable    Level of consciousness: lethargic and responds to stimulation    Nausea/Vomiting: no nausea/vomiting    Complications: none    Transfer of care protocol was followed      Last vitals: Visit Vitals  BP (!) 145/67 (BP Location: Left arm, Patient Position: Lying)   Pulse 68   Temp 36.8 °C (98.2 °F) (Skin)   Resp 18   SpO2 100%   Breastfeeding No

## 2023-12-19 NOTE — ANESTHESIA PREPROCEDURE EVALUATION
12/19/2023  Kayla Piper is a 68 y.o., female.      Pre-op Assessment    I have reviewed the Patient Summary Reports.     I have reviewed the Nursing Notes. I have reviewed the NPO Status.   I have reviewed the Medications.     Review of Systems  Anesthesia Hx:  No problems with previous Anesthesia                Cardiovascular:     Hypertension                                  Hypertension         Hepatic/GI:     GERD      Gerd          Musculoskeletal:  Arthritis        Arthritis          Neurological:    Neuromuscular Disease,           Arthritis                         Neuromuscular Disease   Psych:  Psychiatric History  depression                Physical Exam  General: Well nourished    Airway:  Mallampati: II   Mouth Opening: Normal  TM Distance: Normal  Neck ROM: Normal ROM        Anesthesia Plan  Type of Anesthesia, risks & benefits discussed:    Anesthesia Type: Gen Natural Airway  Intra-op Monitoring Plan: Standard ASA Monitors  Induction:  IV  Informed Consent: Informed consent signed with the Patient and all parties understand the risks and agree with anesthesia plan.  All questions answered.   ASA Score: 2    Ready For Surgery From Anesthesia Perspective.     .

## 2023-12-19 NOTE — H&P
CC: Screening for colorectal cancer    68 year old female with above. States that symptoms are absent, no alleviating/exacerbating factors. No family history of colorectal CA. No personal history of polyps. No bleeding or weight loss.     ROS:  No headache, no fever/chills, no chest pain/SOB, no nausea/vomiting/diarrhea/constipation/GI bleeding/abdominal pain, no dysuria/hematuria.    VSSAF   Exam:   Alert and oriented x 3; no apparent distress   PERRLA, sclera anicteric  CV: Regular rate/rhythm, normal PMI   Lungs: Clear bilaterally with no wheeze/rales   Abdomen: Soft, NT/ND, normal bowel sounds   Ext: No cyanosis, clubbing     Impression:   As above    Plan:   Proceed with endoscopy. Further recs to follow.

## 2023-12-19 NOTE — PLAN OF CARE
VSS. Tolerated PO fluids. Adequate pain control. Ambulates easily. Removed IV, tolerated well.  Discharged from facility with family via W/C.

## 2023-12-20 VITALS
TEMPERATURE: 98 F | DIASTOLIC BLOOD PRESSURE: 62 MMHG | HEART RATE: 68 BPM | SYSTOLIC BLOOD PRESSURE: 137 MMHG | RESPIRATION RATE: 16 BRPM | OXYGEN SATURATION: 100 %

## 2023-12-20 NOTE — ANESTHESIA POSTPROCEDURE EVALUATION
Anesthesia Post Evaluation    Patient: Kayla Piper    Procedure(s) Performed: Procedure(s) (LRB):  COLONOSCOPY (N/A)    Final Anesthesia Type: general      Patient location during evaluation: PACU  Patient participation: Yes- Able to Participate  Level of consciousness: awake and alert  Post-procedure vital signs: reviewed and stable  Pain management: adequate  Airway patency: patent    PONV status at discharge: No PONV  Anesthetic complications: no      Cardiovascular status: blood pressure returned to baseline  Respiratory status: unassisted  Hydration status: euvolemic  Follow-up not needed.              Vitals Value Taken Time   /62 12/19/23 1307   Temp 36.5 °C (97.7 °F) 12/19/23 1255   Pulse 70 12/19/23 1309   Resp 16 12/19/23 1305   SpO2 100 % 12/19/23 1309   Vitals shown include unvalidated device data.      Event Time   Out of Recovery 13:16:45         Pain/Moon Score: Moon Score: 10 (12/19/2023  1:05 PM)

## 2024-01-16 ENCOUNTER — OFFICE VISIT (OUTPATIENT)
Dept: FAMILY MEDICINE | Facility: CLINIC | Age: 69
End: 2024-01-16
Payer: COMMERCIAL

## 2024-01-16 VITALS — OXYGEN SATURATION: 97 % | DIASTOLIC BLOOD PRESSURE: 78 MMHG | HEART RATE: 72 BPM | SYSTOLIC BLOOD PRESSURE: 156 MMHG

## 2024-01-16 DIAGNOSIS — Z12.31 ENCOUNTER FOR SCREENING MAMMOGRAM FOR MALIGNANT NEOPLASM OF BREAST: ICD-10-CM

## 2024-01-16 DIAGNOSIS — I10 WHITE COAT SYNDROME WITH DIAGNOSIS OF HYPERTENSION: ICD-10-CM

## 2024-01-16 DIAGNOSIS — F32.A DEPRESSION, UNSPECIFIED DEPRESSION TYPE: ICD-10-CM

## 2024-01-16 DIAGNOSIS — Z78.0 ENCOUNTER FOR OSTEOPOROSIS SCREENING IN ASYMPTOMATIC POSTMENOPAUSAL PATIENT: ICD-10-CM

## 2024-01-16 DIAGNOSIS — I65.23 CAROTID STENOSIS, ASYMPTOMATIC, BILATERAL: ICD-10-CM

## 2024-01-16 DIAGNOSIS — G47.00 INSOMNIA, UNSPECIFIED TYPE: ICD-10-CM

## 2024-01-16 DIAGNOSIS — Z13.820 ENCOUNTER FOR OSTEOPOROSIS SCREENING IN ASYMPTOMATIC POSTMENOPAUSAL PATIENT: ICD-10-CM

## 2024-01-16 DIAGNOSIS — Z79.899 LONG-TERM CURRENT USE OF BENZODIAZEPINE: Primary | ICD-10-CM

## 2024-01-16 DIAGNOSIS — M10.9 GOUT INVOLVING TOE OF RIGHT FOOT, UNSPECIFIED CAUSE, UNSPECIFIED CHRONICITY: ICD-10-CM

## 2024-01-16 DIAGNOSIS — M79.605 PAIN OF LEFT LOWER EXTREMITY: ICD-10-CM

## 2024-01-16 PROBLEM — F51.01 PRIMARY INSOMNIA: Status: ACTIVE | Noted: 2018-05-21

## 2024-01-16 PROCEDURE — 99214 OFFICE O/P EST MOD 30 MIN: CPT | Mod: S$GLB,,, | Performed by: NURSE PRACTITIONER

## 2024-01-16 PROCEDURE — 99999 PR PBB SHADOW E&M-EST. PATIENT-LVL IV: CPT | Mod: PBBFAC,,, | Performed by: NURSE PRACTITIONER

## 2024-01-16 PROCEDURE — 80307 DRUG TEST PRSMV CHEM ANLYZR: CPT | Performed by: NURSE PRACTITIONER

## 2024-01-16 RX ORDER — GABAPENTIN 300 MG/1
300 CAPSULE ORAL NIGHTLY
Qty: 90 CAPSULE | Refills: 1 | Status: SHIPPED | OUTPATIENT
Start: 2024-01-16 | End: 2024-04-23 | Stop reason: SDUPTHER

## 2024-01-16 RX ORDER — ROSUVASTATIN CALCIUM 10 MG/1
10 TABLET, COATED ORAL DAILY
Qty: 90 TABLET | Refills: 3 | Status: SHIPPED | OUTPATIENT
Start: 2024-01-16

## 2024-01-16 RX ORDER — AMLODIPINE BESYLATE 10 MG/1
10 TABLET ORAL DAILY
Qty: 90 TABLET | Refills: 3 | Status: SHIPPED | OUTPATIENT
Start: 2024-01-16

## 2024-01-16 RX ORDER — ESCITALOPRAM OXALATE 20 MG/1
20 TABLET ORAL DAILY
Qty: 90 TABLET | Refills: 3 | Status: SHIPPED | OUTPATIENT
Start: 2024-01-16

## 2024-01-16 RX ORDER — ALLOPURINOL 300 MG/1
300 TABLET ORAL DAILY
Qty: 90 TABLET | Refills: 3 | Status: SHIPPED | OUTPATIENT
Start: 2024-01-16

## 2024-01-16 RX ORDER — VALSARTAN AND HYDROCHLOROTHIAZIDE 320; 25 MG/1; MG/1
1 TABLET, FILM COATED ORAL DAILY
Qty: 90 TABLET | Refills: 3 | Status: SHIPPED | OUTPATIENT
Start: 2024-01-16

## 2024-01-16 RX ORDER — TEMAZEPAM 30 MG/1
30 CAPSULE ORAL NIGHTLY PRN
Qty: 30 CAPSULE | Refills: 2 | Status: SHIPPED | OUTPATIENT
Start: 2024-01-16 | End: 2024-04-08

## 2024-01-16 NOTE — PROGRESS NOTES
Subjective:       Patient ID: Kayla Piper is a 68 y.o. female.    Chief Complaint: Follow-up and Medication Refill    Kayla Piper presents to the clinic today for medication refills on her Temazepam that she uses for insomnia.   She has been under the care of the following  PCP:Dr. Jimenez,   [facility]: - f/u yearly for history of Right Carotid Bruit,   Gastroenterology: Dr. Herron- colonoscopy 2013- reports that it was normal.   Orthopedics: Dr. Kearney-  S/P Right Robotic total knee arthroplasty   Patient Active Problem List  Diagnosis  ·           Rhinitis  ·           Cervical disc displacement  ·           Cervical radiculopathy  ·           Chronic hoarseness  ·           ETD (eustachian tube dysfunction)  ·           Laryngopharyngeal reflux (LPR)  ·           Depression  ·           Hypertension  ·           Insomnia  ·           Gastroesophageal reflux disease without esophagitis  ·           Carotid stenosis, right  She is in need of her routine maintenance medications. She is on Temazepam for history of insomnia.  reviewed and is consistent with patient's history without any discrepancies/early fills.      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     Appearance: Normal appearance.   Eyes:      Extraocular  Movements: Extraocular movements intact.      Conjunctiva/sclera: Conjunctivae normal.   Neck:      Vascular: Carotid bruit present.   Cardiovascular:      Rate and Rhythm: Normal rate and regular rhythm.      Heart sounds: Normal heart sounds.   Pulmonary:      Effort: Pulmonary effort is normal. No respiratory distress.      Breath sounds: Normal breath sounds.   Skin:     General: Skin is warm and dry.      Findings: No rash.   Neurological:      Mental Status: She is alert and oriented to person, place, and time.   Psychiatric:         Mood and Affect: Mood normal.         Behavior: Behavior normal.         Lab Results   Component Value Date    WBC 8.00 09/14/2023    HGB 12.3 09/14/2023    HCT 38.2 09/14/2023     09/14/2023    CHOL 136 07/05/2022    TRIG 69 07/05/2022    HDL 47 07/05/2022    ALT 17 07/25/2023    AST 24 07/25/2023     09/14/2023    K 3.8 09/14/2023     09/14/2023    CREATININE 0.9 09/14/2023    BUN 21 09/14/2023    CO2 25 09/14/2023    HGBA1C 5.7 (H) 07/05/2022     The 10-year ASCVD risk score (Omar GEIGER, et al., 2019) is: 10.7%    Values used to calculate the score:      Age: 68 years      Sex: Female      Is Non- : No      Diabetic: No      Tobacco smoker: No      Systolic Blood Pressure: 156 mmHg      Is BP treated: Yes      HDL Cholesterol: 47 mg/dL      Total Cholesterol: 136 mg/dL  Visit Vitals  BP (!) 156/78 (BP Location: Left arm, Patient Position: Sitting, BP Method: Medium (Manual))   Pulse 72   SpO2 97%      Assessment:       1. Long-term current use of benzodiazepine    2. Insomnia, unspecified type    3. Depression, unspecified depression type    4. White coat syndrome with diagnosis of hypertension    5. Carotid stenosis, asymptomatic, bilateral    6. Pain of left lower extremity    7. Gout involving toe of right foot, unspecified cause, unspecified chronicity    8. Encounter for osteoporosis screening in asymptomatic postmenopausal patient     9. Encounter for screening mammogram for malignant neoplasm of breast        Plan:       1. Long-term current use of benzodiazepine  -     Drug screen panel, in-house  -     temazepam (RESTORIL) 30 mg capsule; Take 1 capsule (30 mg total) by mouth nightly as needed for Insomnia.  Dispense: 30 capsule; Refill: 2   WNL  UDS   2. Insomnia, unspecified type  -     temazepam (RESTORIL) 30 mg capsule; Take 1 capsule (30 mg total) by mouth nightly as needed for Insomnia.  Dispense: 30 capsule; Refill: 2  Stable continue current regimen   3. Depression, unspecified depression type  Comments:  Stable  Orders:  -     EScitalopram oxalate (LEXAPRO) 20 MG tablet; Take 1 tablet (20 mg total) by mouth once daily.  Dispense: 90 tablet; Refill: 3    4. White coat syndrome with diagnosis of hypertension  Comments:  Wel controlled.  Continue to monitor at home.    Orders:  -     valsartan-hydrochlorothiazide (DIOVAN-HCT) 320-25 mg per tablet; Take 1 tablet by mouth once daily.  Dispense: 90 tablet; Refill: 3  -     amLODIPine (NORVASC) 10 MG tablet; Take 1 tablet (10 mg total) by mouth once daily.  Dispense: 90 tablet; Refill: 3  The patient was counseled on HTN education, management and recommendations. The need for weight reduction was reinforced and a BMI goal of 19 to 25 was set.  Patient was encouraged to adhere to a low sodium diet and a DASH diet was recommended. Patient was also encouraged to engage in routine exercise such as walking most days of the week greater than 30 minutes. Patient education materials were provided to the patient for home review and further reinforcement of topics discussed.     Please monitor your blood pressure twice a day.  Make sure you have not had any caffeine or tobacco with in 45 minutes of checking your blood pressure.  Keep a log to bring to your next office visit.       5. Carotid stenosis, asymptomatic, bilateral  Comments:  s/p right CEA.  She also had currently nonsignificant  stenosis on the left; U/S q 6 months; followed by Dr. Méndez  Orders:  -     rosuvastatin (CRESTOR) 10 MG tablet; Take 1 tablet (10 mg total) by mouth once daily.  Dispense: 90 tablet; Refill: 3    6. Pain of left lower extremity  Comments:  Did resolve with gabapentin so likely was sciatica  Orders:  -     gabapentin (NEURONTIN) 300 MG capsule; Take 1 capsule (300 mg total) by mouth every evening.  Dispense: 90 capsule; Refill: 1    7. Gout involving toe of right foot, unspecified cause, unspecified chronicity  -     allopurinoL (ZYLOPRIM) 300 MG tablet; Take 1 tablet (300 mg total) by mouth once daily.  Dispense: 90 tablet; Refill: 3    8. Encounter for osteoporosis screening in asymptomatic postmenopausal patient  -     DXA Body Composition; Future; Expected date: 01/16/2024    9. Encounter for screening mammogram for malignant neoplasm of breast  -     Mammo Digital Screening Bilat w/ Maco; Future; Expected date: 01/16/2024       Follow up in about 3 months (around 4/16/2024).      Future Appointments       Date Provider Specialty Appt Notes    2/19/2024 Mao Kearney MD Orthopedics 2 month f/u    2/27/2024  Radiology Mammogram    4/23/2024 Janet Aggarwal NP Family Medicine 3 Month Follow Up - UDS

## 2024-01-17 LAB
AMPHET+METHAMPHET UR QL: NEGATIVE
BARBITURATES UR QL SCN>200 NG/ML: NEGATIVE
BENZODIAZ UR QL SCN>200 NG/ML: NEGATIVE
BZE UR QL SCN: NEGATIVE
CANNABINOIDS UR QL SCN: NEGATIVE
CREAT UR-MCNC: 114.6 MG/DL (ref 15–325)
METHADONE UR QL SCN>300 NG/ML: NEGATIVE
OPIATES UR QL SCN: NEGATIVE
PCP UR QL SCN>25 NG/ML: NEGATIVE
TOXICOLOGY INFORMATION: NORMAL

## 2024-02-19 ENCOUNTER — OFFICE VISIT (OUTPATIENT)
Dept: ORTHOPEDICS | Facility: CLINIC | Age: 69
End: 2024-02-19
Payer: COMMERCIAL

## 2024-02-19 VITALS — HEIGHT: 64 IN | WEIGHT: 160 LBS | BODY MASS INDEX: 27.31 KG/M2 | RESPIRATION RATE: 18 BRPM

## 2024-02-19 DIAGNOSIS — Z96.651 STATUS POST TOTAL PROSTHETIC REPLACEMENT OF KNEE JOINT USING CEMENT, RIGHT: Primary | ICD-10-CM

## 2024-02-19 PROCEDURE — 99999 PR PBB SHADOW E&M-EST. PATIENT-LVL III: CPT | Mod: PBBFAC,,, | Performed by: ORTHOPAEDIC SURGERY

## 2024-02-19 PROCEDURE — 99214 OFFICE O/P EST MOD 30 MIN: CPT | Mod: S$GLB,,, | Performed by: ORTHOPAEDIC SURGERY

## 2024-02-19 NOTE — PROGRESS NOTES
Past Medical History:   Diagnosis Date    Carotid stenosis, right     DDD (degenerative disc disease), cervical     Depression     Hyperkalemia     Hypertension     Insomnia     Rhinitis     Synovial cyst of popliteal space        Past Surgical History:   Procedure Laterality Date    ANTERIOR CERVICAL DISCECTOMY W/ FUSION      APPENDECTOMY      CAROTID ENDARTERECTOMY Right 10/03/2018    COLONOSCOPY  2013    COLONOSCOPY N/A 12/19/2023    Procedure: COLONOSCOPY;  Surgeon: Martin Carvalho MD;  Location: [facility] ENDO;  Service: Endoscopy;  Laterality: N/A;    ESOPHAGOGASTRODUODENOSCOPY N/A 10/23/2023    Procedure: EGD (ESOPHAGOGASTRODUODENOSCOPY);  Surgeon: Martin Carvalho MD;  Location: [facility] ENDO;  Service: Endoscopy;  Laterality: N/A;    KNEE SURGERY Right     REPAIR MENISCUS SCOPE    ROBOTIC ARTHROPLASTY, KNEE Right 9/26/2023    Procedure: ROBOTIC ARTHROPLASTY, KNEE, TOTAL;  Surgeon: Mao Kearney MD;  Location: [facility] OR;  Service: Orthopedics;  Laterality: Right;    TUBAL LIGATION  2000    UMBILICAL HERNIA REPAIR  12/14/2022       Current Outpatient Medications   Medication Sig    acetaminophen (TYLENOL) 500 MG tablet Take 2 tablets (1,000 mg total) by mouth every 8 (eight) hours as needed for Pain.    albuterol (PROVENTIL/VENTOLIN HFA) 90 mcg/actuation inhaler USE 2 INHALATIONS EVERY 4 TO 6 HOURS AS NEEDED FOR SHORTNESS OF BREATH    allopurinoL (ZYLOPRIM) 300 MG tablet Take 1 tablet (300 mg total) by mouth once daily.    amLODIPine (NORVASC) 10 MG tablet Take 1 tablet (10 mg total) by mouth once daily.    aspirin 325 MG tablet Take 325 mg by mouth once daily.    cetirizine (ZYRTEC) 10 MG tablet Take 10 mg by mouth once daily.    EScitalopram oxalate (LEXAPRO) 20 MG tablet Take 1 tablet (20 mg total) by mouth once daily.    fluticasone propionate (FLONASE) 50 mcg/actuation nasal spray 1 spray by Each Nare route once daily.    gabapentin (NEURONTIN) 300 MG capsule Take 1 capsule (300 mg total) by mouth  every evening.    ibuprofen (ADVIL,MOTRIN) 600 MG tablet Take 1 tablet (600 mg total) by mouth 3 (three) times daily as needed for Pain.    multivitamin (THERAGRAN) per tablet Take 1 tablet by mouth once daily.    omeprazole (PRILOSEC) 40 MG capsule Take 1 capsule (40 mg total) by mouth once daily.    ondansetron (ZOFRAN-ODT) 4 MG TbDL Take 1 tablet (4 mg total) by mouth every 6 (six) hours as needed.    progesterone (PROMETRIUM) 200 MG capsule Take 1 capsule by mouth once daily.    rosuvastatin (CRESTOR) 10 MG tablet Take 1 tablet (10 mg total) by mouth once daily.    temazepam (RESTORIL) 30 mg capsule Take 1 capsule (30 mg total) by mouth nightly as needed for Insomnia.    valsartan-hydrochlorothiazide (DIOVAN-HCT) 320-25 mg per tablet Take 1 tablet by mouth once daily.     No current facility-administered medications for this visit.       Review of patient's allergies indicates:  No Known Allergies    Family History   Problem Relation Age of Onset    Hypertension Mother     Coronary artery disease Mother     Lung cancer Father     Hypertension Sister        Social History     Socioeconomic History    Marital status:    Occupational History    Occupation:    Tobacco Use    Smoking status: Former     Current packs/day: 0.00     Average packs/day: 0.3 packs/day for 18.0 years (4.5 ttl pk-yrs)     Types: Cigarettes     Start date:      Quit date: 2000     Years since quittin.1    Smokeless tobacco: Never   Substance and Sexual Activity    Alcohol use: Not Currently     Comment: occasional    Drug use: No    Sexual activity: Yes     Partners: Male     Birth control/protection: None     Comment:    Social History Narrative    Live with      Social Determinants of Health     Financial Resource Strain: Low Risk  (2022)    Overall Financial Resource Strain (CARDIA)     Difficulty of Paying Living Expenses: Not hard at all   Food Insecurity: No Food Insecurity (2022)     Hunger Vital Sign     Worried About Running Out of Food in the Last Year: Never true     Ran Out of Food in the Last Year: Never true   Transportation Needs: No Transportation Needs (6/7/2022)    PRAPARE - Transportation     Lack of Transportation (Medical): No     Lack of Transportation (Non-Medical): No   Physical Activity: Insufficiently Active (6/7/2022)    Exercise Vital Sign     Days of Exercise per Week: 4 days     Minutes of Exercise per Session: 30 min   Stress: Stress Concern Present (6/7/2022)    [employer] [employer] - Occupational Stress Questionnaire     Feeling of Stress : Very much   Social Connections: Unknown (6/7/2022)    Social Connection and Isolation Panel [NHANES]     Frequency of Communication with Friends and Family: More than three times a week     Frequency of Social Gatherings with Friends and Family: Patient declined     Active Member of Clubs or Organizations: Yes     Attends Club or Organization Meetings: Patient declined     Marital Status:    Housing Stability: Low Risk  (6/7/2022)    Housing Stability Vital Sign     Unable to Pay for Housing in the Last Year: No     Number of Places Lived in the Last Year: 1     Unstable Housing in the Last Year: No       Chief Complaint:   Chief Complaint   Patient presents with    Follow-up     S/p right TKA, dos 9/26/23       Date of surgery:  September 26, 2023    History of present illness:  68-year-old female underwent right total knee arthroplasty.  She is doing pretty well.  Has a lot of burning pain in the posterior and lateral aspect of her leg.  Patient was on gabapentin prior to the surgery.  She continues to take it.  She takes ibuprofen at night.  A lot of tightness in the back of her knee and difficulty getting it straight.  Pain is a 5/10.  Patient is done with formal physical therapy and doing home exercises.    Review of Systems:    Musculoskeletal:  See HPI        Physical Examination:    Vital Signs:     Vitals:    02/19/24 0834   Resp: 18         Body mass index is 27.46 kg/m².    This a well-developed, well nourished patient in no acute distress.  They are alert and oriented and cooperative to examination.  Pt. walks without an antalgic gait.      Examination of the right knee shows healed surgical incision.  No erythema drainage.  Patient has range of motion from 2° to 120°.  A little irritability and tightness with trying to obtain full extension.  Pain along the hamstring tendons in the back.  Stable to varus and valgus stress.  Negative drawer exam.    X-rays:  4 views of the right knee are reviewed which show well-aligned right total knee arthroplasty components without complication     Assessment::  Status post right total knee arthroplasty  Right continued hamstring tightness    Plan:  I think she is doing well.  I printed up some exercises to focus on hamstring stretching.  Recommended some topicals for the muscles and nerves.  Follow up in 3 months.    This note was created using M Modal voice recognition software that occasionally misinterpreted phrases or words.

## 2024-02-27 ENCOUNTER — TELEPHONE (OUTPATIENT)
Dept: FAMILY MEDICINE | Facility: CLINIC | Age: 69
End: 2024-02-27
Payer: COMMERCIAL

## 2024-02-27 ENCOUNTER — HOSPITAL ENCOUNTER (OUTPATIENT)
Dept: RADIOLOGY | Facility: CLINIC | Age: 69
Discharge: HOME | End: 2024-02-27
Attending: INTERNAL MEDICINE
Payer: COMMERCIAL

## 2024-02-27 ENCOUNTER — HOSPITAL ENCOUNTER (OUTPATIENT)
Dept: RADIOLOGY | Facility: CLINIC | Age: 69
Discharge: HOME | End: 2024-02-27
Attending: NURSE PRACTITIONER
Payer: COMMERCIAL

## 2024-02-27 DIAGNOSIS — Z13.820 ENCOUNTER FOR OSTEOPOROSIS SCREENING IN ASYMPTOMATIC POSTMENOPAUSAL PATIENT: ICD-10-CM

## 2024-02-27 DIAGNOSIS — Z78.0 ENCOUNTER FOR OSTEOPOROSIS SCREENING IN ASYMPTOMATIC POSTMENOPAUSAL PATIENT: Primary | ICD-10-CM

## 2024-02-27 DIAGNOSIS — Z78.0 ENCOUNTER FOR OSTEOPOROSIS SCREENING IN ASYMPTOMATIC POSTMENOPAUSAL PATIENT: ICD-10-CM

## 2024-02-27 DIAGNOSIS — Z13.820 ENCOUNTER FOR OSTEOPOROSIS SCREENING IN ASYMPTOMATIC POSTMENOPAUSAL PATIENT: Primary | ICD-10-CM

## 2024-02-27 PROCEDURE — 77067 SCR MAMMO BI INCL CAD: CPT | Mod: 26,,, | Performed by: RADIOLOGY

## 2024-02-27 PROCEDURE — 77080 DXA BONE DENSITY AXIAL: CPT | Mod: 26,,, | Performed by: RADIOLOGY

## 2024-02-27 PROCEDURE — 77067 SCR MAMMO BI INCL CAD: CPT | Mod: TC,PO

## 2024-02-27 PROCEDURE — 77063 BREAST TOMOSYNTHESIS BI: CPT | Mod: 26,,, | Performed by: RADIOLOGY

## 2024-02-27 PROCEDURE — 77080 DXA BONE DENSITY AXIAL: CPT | Mod: TC,PO

## 2024-02-27 NOTE — TELEPHONE ENCOUNTER
----- Message from Radha Haque LPN sent at 2/27/2024  1:13 PM CST -----  Regarding: FW: BONE DENSITY BODY COMP  Pt currently scheduling at [facility] for bone density comp, however, they do not offer this test at that location.   ----- Message -----  From: Astrid Mata  Sent: 2/27/2024   8:43 AM CST  To: Jasen Gonzales Staff  Subject: BONE DENSITY BODY COMP                           Good Morning,    We don't do that type of Bone Density Scan in [place]. We do the DXA Bone Density Axial Skeleton.    [facility] Scheduling

## 2024-02-28 DIAGNOSIS — R92.8 ABNORMAL MAMMOGRAM: Primary | ICD-10-CM

## 2024-02-28 DIAGNOSIS — R73.03 PREDIABETES: ICD-10-CM

## 2024-03-05 ENCOUNTER — HOSPITAL ENCOUNTER (OUTPATIENT)
Dept: RADIOLOGY | Facility: HOSPITAL | Age: 69
Discharge: HOME | End: 2024-03-05
Attending: PHYSICIAN ASSISTANT
Payer: COMMERCIAL

## 2024-03-05 DIAGNOSIS — R92.8 ABNORMAL MAMMOGRAM: ICD-10-CM

## 2024-03-05 PROCEDURE — 77065 DX MAMMO INCL CAD UNI: CPT | Mod: TC,LT

## 2024-03-05 PROCEDURE — 77061 BREAST TOMOSYNTHESIS UNI: CPT | Mod: TC,LT

## 2024-03-05 PROCEDURE — 77061 BREAST TOMOSYNTHESIS UNI: CPT | Mod: 26,LT,, | Performed by: RADIOLOGY

## 2024-03-05 PROCEDURE — 76642 ULTRASOUND BREAST LIMITED: CPT | Mod: 26,LT,, | Performed by: RADIOLOGY

## 2024-03-05 PROCEDURE — 76642 ULTRASOUND BREAST LIMITED: CPT | Mod: TC,LT

## 2024-03-05 PROCEDURE — 77065 DX MAMMO INCL CAD UNI: CPT | Mod: 26,LT,, | Performed by: RADIOLOGY

## 2024-03-20 DIAGNOSIS — R73.03 PREDIABETES: ICD-10-CM

## 2024-04-05 DIAGNOSIS — Z79.899 LONG-TERM CURRENT USE OF BENZODIAZEPINE: ICD-10-CM

## 2024-04-05 DIAGNOSIS — G47.00 INSOMNIA, UNSPECIFIED TYPE: ICD-10-CM

## 2024-04-08 RX ORDER — TEMAZEPAM 30 MG/1
30 CAPSULE ORAL NIGHTLY PRN
Qty: 30 CAPSULE | Refills: 0 | Status: SHIPPED | OUTPATIENT
Start: 2024-04-08 | End: 2024-04-23 | Stop reason: SDUPTHER

## 2024-04-23 ENCOUNTER — PATIENT MESSAGE (OUTPATIENT)
Dept: FAMILY MEDICINE | Facility: CLINIC | Age: 69
End: 2024-04-23

## 2024-04-23 ENCOUNTER — OFFICE VISIT (OUTPATIENT)
Dept: FAMILY MEDICINE | Facility: CLINIC | Age: 69
End: 2024-04-23
Payer: COMMERCIAL

## 2024-04-23 VITALS
SYSTOLIC BLOOD PRESSURE: 144 MMHG | OXYGEN SATURATION: 99 % | DIASTOLIC BLOOD PRESSURE: 76 MMHG | TEMPERATURE: 98 F | WEIGHT: 164.69 LBS | HEART RATE: 74 BPM | BODY MASS INDEX: 28.27 KG/M2 | RESPIRATION RATE: 12 BRPM

## 2024-04-23 DIAGNOSIS — M79.605 PAIN OF LEFT LOWER EXTREMITY: ICD-10-CM

## 2024-04-23 DIAGNOSIS — G47.00 INSOMNIA, UNSPECIFIED TYPE: ICD-10-CM

## 2024-04-23 DIAGNOSIS — Z79.899 LONG-TERM CURRENT USE OF BENZODIAZEPINE: Primary | ICD-10-CM

## 2024-04-23 DIAGNOSIS — I10 WHITE COAT SYNDROME WITH DIAGNOSIS OF HYPERTENSION: ICD-10-CM

## 2024-04-23 LAB
AMPHET+METHAMPHET UR QL: NEGATIVE
BARBITURATES UR QL SCN>200 NG/ML: NEGATIVE
BENZODIAZ UR QL SCN>200 NG/ML: ABNORMAL
BZE UR QL SCN: NEGATIVE
CANNABINOIDS UR QL SCN: NEGATIVE
CREAT UR-MCNC: 124.2 MG/DL (ref 15–325)
METHADONE UR QL SCN>300 NG/ML: NEGATIVE
OPIATES UR QL SCN: NEGATIVE
PCP UR QL SCN>25 NG/ML: NEGATIVE
TOXICOLOGY INFORMATION: ABNORMAL

## 2024-04-23 PROCEDURE — 99999 PR PBB SHADOW E&M-EST. PATIENT-LVL IV: CPT | Mod: PBBFAC,,, | Performed by: NURSE PRACTITIONER

## 2024-04-23 PROCEDURE — 80307 DRUG TEST PRSMV CHEM ANLYZR: CPT | Performed by: NURSE PRACTITIONER

## 2024-04-23 PROCEDURE — 99213 OFFICE O/P EST LOW 20 MIN: CPT | Mod: S$GLB,,, | Performed by: NURSE PRACTITIONER

## 2024-04-23 RX ORDER — TEMAZEPAM 30 MG/1
30 CAPSULE ORAL NIGHTLY PRN
Qty: 30 CAPSULE | Refills: 0 | Status: SHIPPED | OUTPATIENT
Start: 2024-04-23 | End: 2024-05-16

## 2024-04-23 RX ORDER — GABAPENTIN 300 MG/1
300 CAPSULE ORAL NIGHTLY
Qty: 90 CAPSULE | Refills: 1 | Status: SHIPPED | OUTPATIENT
Start: 2024-04-23 | End: 2025-04-23

## 2024-04-23 NOTE — PROGRESS NOTES
Subjective:       Patient ID: Kayla Piper is a 68 y.o. female.    Chief Complaint: Follow-up (3 month )    Kayla Piper presents to the clinic today for medication refills on her Temazepam that she uses for insomnia.   She has been under the care of the following  PCP:Dr. Jimenez,   Vascula: - f/u yearly for history of Right Carotid Bruit,   Gastroenterology: Dr. Herron- colonoscopy 2013- reports that it was normal.   Orthopedics: Dr. Kearney-  S/P Right Robotic total knee arthroplasty   Patient Active Problem List  Diagnosis  ·           Rhinitis  ·           Cervical disc displacement  ·           Cervical radiculopathy  ·           Chronic hoarseness  ·           ETD (eustachian tube dysfunction)  ·           Laryngopharyngeal reflux (LPR)  ·           Depression  ·           Hypertension  ·           Insomnia  ·           Gastroesophageal reflux disease without esophagitis  ·           Carotid stenosis, right  She is in need of her routine maintenance medications. She is on Temazepam for history of insomnia.  reviewed and is consistent with patient's history without any discrepancies/early fills.      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     Appearance: Normal appearance.   Eyes:      Extraocular Movements:  Extraocular movements intact.      Conjunctiva/sclera: Conjunctivae normal.   Cardiovascular:      Rate and Rhythm: Normal rate and regular rhythm.      Heart sounds: Normal heart sounds.   Pulmonary:      Effort: Pulmonary effort is normal. No respiratory distress.      Breath sounds: Normal breath sounds.   Skin:     General: Skin is warm and dry.      Findings: No rash.   Neurological:      Mental Status: She is alert and oriented to person, place, and time.   Psychiatric:         Mood and Affect: Mood normal.         Behavior: Behavior normal.         Lab Results   Component Value Date    WBC 8.00 09/14/2023    HGB 12.3 09/14/2023    HCT 38.2 09/14/2023     09/14/2023    CHOL 136 07/05/2022    TRIG 69 07/05/2022    HDL 47 07/05/2022    ALT 17 07/25/2023    AST 24 07/25/2023     09/14/2023    K 3.8 09/14/2023     09/14/2023    CREATININE 0.9 09/14/2023    BUN 21 09/14/2023    CO2 25 09/14/2023    HGBA1C 5.7 (H) 07/05/2022     The 10-year ASCVD risk score (Omar GEIGER, et al., 2019) is: 9.2%    Values used to calculate the score:      Age: 68 years      Sex: Female      Is Non- : No      Diabetic: No      Tobacco smoker: No      Systolic Blood Pressure: 144 mmHg      Is BP treated: Yes      HDL Cholesterol: 47 mg/dL      Total Cholesterol: 136 mg/dL  Visit Vitals  BP (!) 144/76 (BP Location: Left arm, Patient Position: Sitting, BP Method: Medium (Automatic))   Pulse 74   Temp 97.5 °F (36.4 °C) (Oral)   Resp 12   Wt 74.7 kg (164 lb 11.2 oz)   SpO2 99%   BMI 28.27 kg/m²      Assessment:       1. Long-term current use of benzodiazepine    2. Insomnia, unspecified type    3. Pain of left lower extremity    4. White coat syndrome with diagnosis of hypertension        Plan:       1. Long-term current use of benzodiazepine  -     Drug screen panel, in-house  -     temazepam (RESTORIL) 30 mg capsule; Take 1 capsule (30 mg total) by mouth nightly as needed for Insomnia.  Dispense:  30 capsule; Refill: 0   WNL  UDS   Continue current medication regimen    2. Insomnia, unspecified type  -     temazepam (RESTORIL) 30 mg capsule; Take 1 capsule (30 mg total) by mouth nightly as needed for Insomnia.  Dispense: 30 capsule; Refill: 0    3. Pain of left lower extremity  Comments:  Did resolve with gabapentin so likely was sciatica  Orders:  -     gabapentin (NEURONTIN) 300 MG capsule; Take 1 capsule (300 mg total) by mouth every evening.  Dispense: 90 capsule; Refill: 1    4. White coat syndrome with diagnosis of hypertension  The patient was counseled on HTN education, management and recommendations. The need for weight reduction was reinforced and a BMI goal of 19 to 25 was set.  Patient was encouraged to adhere to a low sodium diet and a DASH diet was recommended. Patient was also encouraged to engage in routine exercise such as walking most days of the week greater than 30 minutes. Patient education materials were provided to the patient for home review and further reinforcement of topics discussed.     Please monitor your blood pressure twice a day.  Make sure you have not had any caffeine or tobacco with in 45 minutes of checking your blood pressure.  Keep a log to bring to your next office visit.           Follow up in about 3 months (around 7/23/2024).      Future Appointments       Date Provider Specialty Appt Notes    5/20/2024 Mao Kearney MD Orthopedics [facility]-3 month f/u

## 2024-05-15 DIAGNOSIS — G47.00 INSOMNIA, UNSPECIFIED TYPE: ICD-10-CM

## 2024-05-15 DIAGNOSIS — Z79.899 LONG-TERM CURRENT USE OF BENZODIAZEPINE: ICD-10-CM

## 2024-05-16 RX ORDER — TEMAZEPAM 30 MG/1
30 CAPSULE ORAL NIGHTLY PRN
Qty: 30 CAPSULE | Refills: 0 | Status: SHIPPED | OUTPATIENT
Start: 2024-05-16

## 2024-05-30 ENCOUNTER — OFFICE VISIT (OUTPATIENT)
Dept: ORTHOPEDICS | Facility: CLINIC | Age: 69
End: 2024-05-30
Payer: COMMERCIAL

## 2024-05-30 VITALS — BODY MASS INDEX: 28.12 KG/M2 | HEIGHT: 64 IN | WEIGHT: 164.69 LBS | RESPIRATION RATE: 18 BRPM

## 2024-05-30 DIAGNOSIS — M76.51 PATELLAR TENDINITIS, RIGHT KNEE: ICD-10-CM

## 2024-05-30 DIAGNOSIS — Z96.651 STATUS POST TOTAL PROSTHETIC REPLACEMENT OF KNEE JOINT USING CEMENT, RIGHT: Primary | ICD-10-CM

## 2024-05-30 PROCEDURE — 99999 PR PBB SHADOW E&M-EST. PATIENT-LVL III: CPT | Mod: PBBFAC,,, | Performed by: ORTHOPAEDIC SURGERY

## 2024-05-30 PROCEDURE — 99214 OFFICE O/P EST MOD 30 MIN: CPT | Mod: S$GLB,,, | Performed by: ORTHOPAEDIC SURGERY

## 2024-05-30 NOTE — PROGRESS NOTES
Past Medical History:   Diagnosis Date    Carotid stenosis, right     DDD (degenerative disc disease), cervical     Depression     Hyperkalemia     Hypertension     Insomnia     Rhinitis     Synovial cyst of popliteal space        Past Surgical History:   Procedure Laterality Date    ANTERIOR CERVICAL DISCECTOMY W/ FUSION      APPENDECTOMY      AUGMENTATION OF BREAST      CAROTID ENDARTERECTOMY Right 10/03/2018    COLONOSCOPY  2013    COLONOSCOPY N/A 12/19/2023    Procedure: COLONOSCOPY;  Surgeon: Martin Carvalho MD;  Location: [facility] ENDO;  Service: Endoscopy;  Laterality: N/A;    ESOPHAGOGASTRODUODENOSCOPY N/A 10/23/2023    Procedure: EGD (ESOPHAGOGASTRODUODENOSCOPY);  Surgeon: Martin Carvalho MD;  Location: [facility] ENDO;  Service: Endoscopy;  Laterality: N/A;    KNEE SURGERY Right     REPAIR MENISCUS SCOPE    ROBOTIC ARTHROPLASTY, KNEE Right 09/26/2023    Procedure: ROBOTIC ARTHROPLASTY, KNEE, TOTAL;  Surgeon: Mao Kearney MD;  Location: [facility];  Service: Orthopedics;  Laterality: Right;    TUBAL LIGATION  2000    UMBILICAL HERNIA REPAIR  12/14/2022       Current Outpatient Medications   Medication Sig    albuterol (PROVENTIL/VENTOLIN HFA) 90 mcg/actuation inhaler USE 2 INHALATIONS EVERY 4 TO 6 HOURS AS NEEDED FOR SHORTNESS OF BREATH    allopurinoL (ZYLOPRIM) 300 MG tablet Take 1 tablet (300 mg total) by mouth once daily.    amLODIPine (NORVASC) 10 MG tablet Take 1 tablet (10 mg total) by mouth once daily.    aspirin 325 MG tablet Take 325 mg by mouth once daily.    cetirizine (ZYRTEC) 10 MG tablet Take 10 mg by mouth once daily.    EScitalopram oxalate (LEXAPRO) 20 MG tablet Take 1 tablet (20 mg total) by mouth once daily.    fluticasone propionate (FLONASE) 50 mcg/actuation nasal spray 1 spray by Each Nare route once daily.    gabapentin (NEURONTIN) 300 MG capsule Take 1 capsule (300 mg total) by mouth every evening.    ibuprofen (ADVIL,MOTRIN) 600 MG tablet Take 1 tablet (600 mg total) by mouth  3 (three) times daily as needed for Pain.    multivitamin (THERAGRAN) per tablet Take 1 tablet by mouth once daily.    omeprazole (PRILOSEC) 40 MG capsule Take 1 capsule (40 mg total) by mouth once daily.    progesterone (PROMETRIUM) 200 MG capsule Take 1 capsule by mouth once daily.    rosuvastatin (CRESTOR) 10 MG tablet Take 1 tablet (10 mg total) by mouth once daily.    temazepam (RESTORIL) 30 mg capsule TAKE 1 CAPSULE(30 MG) BY MOUTH EVERY NIGHT AS NEEDED FOR INSOMNIA    valsartan-hydrochlorothiazide (DIOVAN-HCT) 320-25 mg per tablet Take 1 tablet by mouth once daily.     No current facility-administered medications for this visit.       Review of patient's allergies indicates:  No Known Allergies    Family History   Problem Relation Name Age of Onset    Hypertension Mother      Coronary artery disease Mother      Lung cancer Father      Hypertension Sister         Social History     Socioeconomic History    Marital status:    Occupational History    Occupation:    Tobacco Use    Smoking status: Former     Current packs/day: 0.00     Average packs/day: 0.3 packs/day for 18.0 years (4.5 ttl pk-yrs)     Types: Cigarettes     Start date:      Quit date: 2000     Years since quittin.4    Smokeless tobacco: Never   Substance and Sexual Activity    Alcohol use: Not Currently     Comment: occasional    Drug use: No    Sexual activity: Yes     Partners: Male     Birth control/protection: None     Comment:    Social History Narrative    Live with      Social Determinants of Health     Financial Resource Strain: Low Risk  (2024)    Overall Financial Resource Strain (CARDIA)     Difficulty of Paying Living Expenses: Not very hard   Food Insecurity: No Food Insecurity (2024)    Hunger Vital Sign     Worried About Running Out of Food in the Last Year: Never true     Ran Out of Food in the Last Year: Never true   Transportation Needs: No Transportation Needs (2024)     PRAPARE - Transportation     Lack of Transportation (Medical): No     Lack of Transportation (Non-Medical): No   Physical Activity: Insufficiently Active (4/22/2024)    Exercise Vital Sign     Days of Exercise per Week: 3 days     Minutes of Exercise per Session: 20 min   Stress: Stress Concern Present (4/22/2024)    [place] [employer] - Occupational Stress Questionnaire     Feeling of Stress : Very much   Housing Stability: Low Risk  (6/7/2022)    Housing Stability Vital Sign     Unable to Pay for Housing in the Last Year: No     Number of Places Lived in the Last Year: 1     Unstable Housing in the Last Year: No       Chief Complaint:   Chief Complaint   Patient presents with    Follow-up     S/p right TKA, dos 9/26/23         Date of surgery:  September 26, 2023    History of present illness:  68-year-old female underwent right total knee arthroplasty.  She is doing pretty well.  Has a lot of burning pain in the posterior and lateral aspect of her leg.  Patient was on gabapentin prior to the surgery.  She continues to take it.  She takes ibuprofen at night.  Pain is a 3/10.  Pain mostly around her patellar tendon.  Pain mostly at the end of the day or with going up and down stairs.  Review of Systems:    Musculoskeletal:  See HPI        Physical Examination:    Vital Signs:    Vitals:    05/30/24 0808   Resp: 18         Body mass index is 28.27 kg/m².    This a well-developed, well nourished patient in no acute distress.  They are alert and oriented and cooperative to examination.  Pt. walks without an antalgic gait.      Examination of the right knee shows healed surgical incision.  No erythema drainage.  Patient has range of motion from 0° to 120°.  A little irritability and tightness with trying to obtain full extension.  Pain Over the patellar tendon insertion in the patella.  Mild fullness of her knee in general.  Stable to varus and valgus stress.  Negative drawer exam.    X-rays:  4  views of the right knee are reviewed which show well-aligned right total knee arthroplasty components without complication     Assessment::  Status post right total knee arthroplasty  Right patellar tendinitis    Plan:  I recommended some formal physical therapy for patellar tendinitis.  Hopefully this can help resolve some of her symptoms and restore more normal gait.  I will see her back in 2 months.    This note was created using [employer] voice recognition software that occasionally misinterpreted phrases or words.

## 2024-06-09 DIAGNOSIS — J41.0 SMOKERS' COUGH: Primary | ICD-10-CM

## 2024-06-10 RX ORDER — ALBUTEROL SULFATE 90 UG/1
AEROSOL, METERED RESPIRATORY (INHALATION)
Qty: 25.5 G | Refills: 3 | Status: SHIPPED | OUTPATIENT
Start: 2024-06-10

## 2024-06-18 ENCOUNTER — TELEPHONE (OUTPATIENT)
Dept: ORTHOPEDICS | Facility: CLINIC | Age: 69
End: 2024-06-18
Payer: COMMERCIAL

## 2024-06-18 NOTE — TELEPHONE ENCOUNTER
"----- Message from Rosette Tai LPN sent at 6/18/2024  9:56 AM CDT -----  Regarding: FW: NP PHYSICAL THERAPY EVAL    ----- Message -----  From: Janet Gutiérrez  Sent: 6/18/2024   9:51 AM CDT  To: [facility] Staff  Subject: NP PHYSICAL THERAPY EVAL                         Good day to you:    I am reaching out to let you know that I was unable to get this patient scheduled for their Physical/Occupational Therapy Eval.    Patient states she is going through other medical issues at this time and refused scheduling for Physical Therapy.    Best regards,  Janet "Ms. Morales"  Access Navigator  [phone] FAX: [phone]  [email or web address]  "

## 2024-08-15 ENCOUNTER — LAB VISIT (OUTPATIENT)
Dept: LAB | Facility: HOSPITAL | Age: 69
End: 2024-08-15
Attending: NURSE PRACTITIONER
Payer: MEDICARE

## 2024-08-15 ENCOUNTER — OFFICE VISIT (OUTPATIENT)
Dept: FAMILY MEDICINE | Facility: CLINIC | Age: 69
End: 2024-08-15
Payer: MEDICARE

## 2024-08-15 VITALS
HEIGHT: 64 IN | WEIGHT: 155.63 LBS | SYSTOLIC BLOOD PRESSURE: 132 MMHG | RESPIRATION RATE: 12 BRPM | HEART RATE: 65 BPM | DIASTOLIC BLOOD PRESSURE: 78 MMHG | OXYGEN SATURATION: 98 % | BODY MASS INDEX: 26.57 KG/M2

## 2024-08-15 DIAGNOSIS — Z13.220 ENCOUNTER FOR LIPID SCREENING FOR CARDIOVASCULAR DISEASE: ICD-10-CM

## 2024-08-15 DIAGNOSIS — R53.83 FATIGUE, UNSPECIFIED TYPE: ICD-10-CM

## 2024-08-15 DIAGNOSIS — E79.0 HYPERURICEMIA: ICD-10-CM

## 2024-08-15 DIAGNOSIS — R73.09 OTHER ABNORMAL GLUCOSE: ICD-10-CM

## 2024-08-15 DIAGNOSIS — Z01.818 PRE-OPERATIVE CLEARANCE: Primary | ICD-10-CM

## 2024-08-15 DIAGNOSIS — Z13.6 ENCOUNTER FOR LIPID SCREENING FOR CARDIOVASCULAR DISEASE: ICD-10-CM

## 2024-08-15 DIAGNOSIS — Z01.818 PRE-OPERATIVE CLEARANCE: ICD-10-CM

## 2024-08-15 DIAGNOSIS — Z79.899 LONG-TERM CURRENT USE OF BENZODIAZEPINE: ICD-10-CM

## 2024-08-15 DIAGNOSIS — R82.998 DARK BROWN URINE: ICD-10-CM

## 2024-08-15 DIAGNOSIS — Z13.1 ENCOUNTER FOR SCREENING EXAMINATION FOR IMPAIRED GLUCOSE REGULATION AND DIABETES MELLITUS: ICD-10-CM

## 2024-08-15 DIAGNOSIS — I10 WHITE COAT SYNDROME WITH DIAGNOSIS OF HYPERTENSION: ICD-10-CM

## 2024-08-15 LAB
ALBUMIN SERPL BCP-MCNC: 4.5 G/DL (ref 3.5–5.2)
ALP SERPL-CCNC: 71 U/L (ref 55–135)
ALT SERPL W/O P-5'-P-CCNC: 13 U/L (ref 10–44)
AMP AMPHETAMINE 1000 NM/ML POC: NEGATIVE
ANION GAP SERPL CALC-SCNC: 7 MMOL/L (ref 8–16)
AST SERPL-CCNC: 16 U/L (ref 10–40)
BAR BARBITURATES 300 NG/ML POC: NEGATIVE
BASOPHILS # BLD AUTO: 0.08 K/UL (ref 0–0.2)
BASOPHILS NFR BLD: 0.9 % (ref 0–1.9)
BILIRUB SERPL-MCNC: 0.7 MG/DL (ref 0.1–1)
BILIRUBIN, UA POC OHS: NEGATIVE
BLOOD, UA POC OHS: NEGATIVE
BUN SERPL-MCNC: 22 MG/DL (ref 8–23)
BUP BUPRENORPHINE 10 NG/ML POC: NEGATIVE
BZO BENZODIAZEPINES 300 NG/ML POC: ABNORMAL
CALCIUM SERPL-MCNC: 11 MG/DL (ref 8.7–10.5)
CHLORIDE SERPL-SCNC: 103 MMOL/L (ref 95–110)
CHOLEST SERPL-MCNC: 126 MG/DL (ref 120–199)
CHOLEST/HDLC SERPL: 2.7 {RATIO} (ref 2–5)
CLARITY, UA POC OHS: ABNORMAL
CO2 SERPL-SCNC: 29 MMOL/L (ref 23–29)
COC COCAINE 300 NG/ML POC: NEGATIVE
COLOR, UA POC OHS: ABNORMAL
CREAT SERPL-MCNC: 1 MG/DL (ref 0.5–1.4)
CREATININE (CR) POC: 50
CTP QC/QA: YES
DIFFERENTIAL METHOD BLD: ABNORMAL
EOSINOPHIL # BLD AUTO: 0.2 K/UL (ref 0–0.5)
EOSINOPHIL NFR BLD: 2.2 % (ref 0–8)
ERYTHROCYTE [DISTWIDTH] IN BLOOD BY AUTOMATED COUNT: 13.2 % (ref 11.5–14.5)
EST. GFR  (NO RACE VARIABLE): >60 ML/MIN/1.73 M^2
ESTIMATED AVG GLUCOSE: 94 MG/DL (ref 68–131)
GLUCOSE SERPL-MCNC: 87 MG/DL (ref 70–110)
GLUCOSE, UA POC OHS: NEGATIVE
HBA1C MFR BLD: 4.9 % (ref 4–5.6)
HCT VFR BLD AUTO: 42 % (ref 37–48.5)
HDLC SERPL-MCNC: 47 MG/DL (ref 40–75)
HDLC SERPL: 37.3 % (ref 20–50)
HGB BLD-MCNC: 13.4 G/DL (ref 12–16)
IMM GRANULOCYTES # BLD AUTO: 0.02 K/UL (ref 0–0.04)
IMM GRANULOCYTES NFR BLD AUTO: 0.2 % (ref 0–0.5)
KETONES, UA POC OHS: NEGATIVE
LDLC SERPL CALC-MCNC: 64 MG/DL (ref 63–159)
LEUKOCYTES, UA POC OHS: ABNORMAL
LYMPHOCYTES # BLD AUTO: 2.6 K/UL (ref 1–4.8)
LYMPHOCYTES NFR BLD: 30 % (ref 18–48)
MCH RBC QN AUTO: 28.2 PG (ref 27–31)
MCHC RBC AUTO-ENTMCNC: 31.9 G/DL (ref 32–36)
MCV RBC AUTO: 88 FL (ref 82–98)
MET METHAMPHETAMINE 1000 NG/ML POC: NEGATIVE
MONOCYTES # BLD AUTO: 0.6 K/UL (ref 0.3–1)
MONOCYTES NFR BLD: 6.6 % (ref 4–15)
MOP/OPI300 MORPHINE 300 NG/ML POC: NEGATIVE
MTD METHADONE 300 NG/ML POC: NEGATIVE
NEUTROPHILS # BLD AUTO: 5.2 K/UL (ref 1.8–7.7)
NEUTROPHILS NFR BLD: 60.1 % (ref 38–73)
NITRITE, UA POC OHS: NEGATIVE
NONHDLC SERPL-MCNC: 79 MG/DL
NRBC BLD-RTO: 0 /100 WBC
OXIDANT (OX) POC: NEGATIVE
OXY OXYCODONE 100 NG/ML POC: NEGATIVE
PH, UA POC OHS: 6
PLATELET # BLD AUTO: 214 K/UL (ref 150–450)
PMV BLD AUTO: 11.1 FL (ref 9.2–12.9)
POTASSIUM SERPL-SCNC: 4.2 MMOL/L (ref 3.5–5.1)
PROT SERPL-MCNC: 7.5 G/DL (ref 6–8.4)
PROTEIN, UA POC OHS: 30
RBC # BLD AUTO: 4.75 M/UL (ref 4–5.4)
SODIUM SERPL-SCNC: 139 MMOL/L (ref 136–145)
SPECIFIC GRAVITY (SG) POC: 1.02
SPECIFIC GRAVITY, UA POC OHS: >=1.03
TEMPERATURE (°F) POC: 92
THC MARIJUANA 50 NG/ML POC: NEGATIVE
TRIGL SERPL-MCNC: 75 MG/DL (ref 30–150)
TSH SERPL DL<=0.005 MIU/L-ACNC: 1.54 UIU/ML (ref 0.4–4)
URATE SERPL-MCNC: 5 MG/DL (ref 2.4–5.7)
UROBILINOGEN, UA POC OHS: 1
WBC # BLD AUTO: 8.66 K/UL (ref 3.9–12.7)

## 2024-08-15 PROCEDURE — 84550 ASSAY OF BLOOD/URIC ACID: CPT | Performed by: NURSE PRACTITIONER

## 2024-08-15 PROCEDURE — 99999PBSHW POCT URINALYSIS(INSTRUMENT): Mod: PBBFAC,,,

## 2024-08-15 PROCEDURE — 99214 OFFICE O/P EST MOD 30 MIN: CPT | Mod: S$PBB,,, | Performed by: NURSE PRACTITIONER

## 2024-08-15 PROCEDURE — 87186 SC STD MICRODIL/AGAR DIL: CPT | Performed by: NURSE PRACTITIONER

## 2024-08-15 PROCEDURE — 84443 ASSAY THYROID STIM HORMONE: CPT | Performed by: NURSE PRACTITIONER

## 2024-08-15 PROCEDURE — 36415 COLL VENOUS BLD VENIPUNCTURE: CPT | Performed by: NURSE PRACTITIONER

## 2024-08-15 PROCEDURE — 80305 DRUG TEST PRSMV DIR OPT OBS: CPT | Mod: PBBFAC,PN | Performed by: NURSE PRACTITIONER

## 2024-08-15 PROCEDURE — 83036 HEMOGLOBIN GLYCOSYLATED A1C: CPT | Performed by: NURSE PRACTITIONER

## 2024-08-15 PROCEDURE — 99999 PR PBB SHADOW E&M-EST. PATIENT-LVL IV: CPT | Mod: PBBFAC,,, | Performed by: NURSE PRACTITIONER

## 2024-08-15 PROCEDURE — 87077 CULTURE AEROBIC IDENTIFY: CPT | Performed by: NURSE PRACTITIONER

## 2024-08-15 PROCEDURE — 81003 URINALYSIS AUTO W/O SCOPE: CPT | Mod: PBBFAC,PN | Performed by: NURSE PRACTITIONER

## 2024-08-15 PROCEDURE — 85025 COMPLETE CBC W/AUTO DIFF WBC: CPT | Performed by: NURSE PRACTITIONER

## 2024-08-15 PROCEDURE — 87088 URINE BACTERIA CULTURE: CPT | Performed by: NURSE PRACTITIONER

## 2024-08-15 PROCEDURE — 80061 LIPID PANEL: CPT | Performed by: NURSE PRACTITIONER

## 2024-08-15 PROCEDURE — 80053 COMPREHEN METABOLIC PANEL: CPT | Performed by: NURSE PRACTITIONER

## 2024-08-15 PROCEDURE — 99214 OFFICE O/P EST MOD 30 MIN: CPT | Mod: PBBFAC,PN | Performed by: NURSE PRACTITIONER

## 2024-08-15 PROCEDURE — 99999PBSHW POCT URINE DRUG SCREEN (WITH BUP): Mod: PBBFAC,,,

## 2024-08-15 PROCEDURE — 87086 URINE CULTURE/COLONY COUNT: CPT | Performed by: NURSE PRACTITIONER

## 2024-08-15 NOTE — PROGRESS NOTES
Subjective:       Patient ID: Kayla Piper is a 68 y.o. female.    Chief Complaint: Follow-up    Kayla Piper presents to the clinic today for medication refills on her Temazepam that she uses for insomnia.   She has been under the care of the following  PCP:Dr. Jimenez,   Vascular: - f/u yearly for history of Right Carotid Bruit,   Gastroenterology: Dr. Herron- colonoscopy 2013- reports that it was normal.   Orthopedics: Dr. Kearney-  S/P Right Robotic total knee arthroplasty   Patient Active Problem List  Diagnosis  ·           Rhinitis  ·           Cervical disc displacement  ·           Cervical radiculopathy  ·           Chronic hoarseness  ·           ETD (eustachian tube dysfunction)  ·           Laryngopharyngeal reflux (LPR)  ·           Depression  ·           Hypertension  ·           Insomnia  ·           Gastroesophageal reflux disease without esophagitis  ·           Carotid stenosis, right  She is in need of her routine maintenance medications. She is on Temazepam for history of insomnia.  reviewed and is consistent with patient's history without any discrepancies/early fills.      Denies any new issues,concerns or complaints at today's visit     Total ABD hysterectomy with bilateral salpingo oophorectomy   8/21/2024:       Review of Systems    Patient Active Problem List   Diagnosis    Rhinitis    Cervical disc displacement    Cervical radiculopathy    Chronic hoarseness    ETD (eustachian tube dysfunction)    Laryngopharyngeal reflux (LPR)    Depressive disorder    Hypertension    Primary insomnia    Gastroesophageal reflux disease without esophagitis    Carotid stenosis, right    Impaired functional mobility and activity tolerance    Gait disturbance    Decreased ROM of right knee    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, right       Objective:      Physical Exam    Lab Results   Component Value Date    WBC 8.00 09/14/2023    HGB 12.3  "09/14/2023    HCT 38.2 09/14/2023     09/14/2023    CHOL 136 07/05/2022    TRIG 69 07/05/2022    HDL 47 07/05/2022    ALT 17 07/25/2023    AST 24 07/25/2023     09/14/2023    K 3.8 09/14/2023     09/14/2023    CREATININE 0.9 09/14/2023    BUN 21 09/14/2023    CO2 25 09/14/2023    HGBA1C 5.7 (H) 07/05/2022     The 10-year ASCVD risk score (Omar GEIGER, et al., 2019) is: 7.8%    Values used to calculate the score:      Age: 68 years      Sex: Female      Is Non- : No      Diabetic: No      Tobacco smoker: No      Systolic Blood Pressure: 132 mmHg      Is BP treated: Yes      HDL Cholesterol: 47 mg/dL      Total Cholesterol: 136 mg/dL  Visit Vitals  /78 (BP Location: Left arm, Patient Position: Sitting, BP Method: Medium (Manual))   Pulse 65   Resp 12   Ht 5' 4" (1.626 m)   Wt 70.6 kg (155 lb 9.6 oz)   SpO2 98%   BMI 26.71 kg/m²      Assessment:       No diagnosis found.    Plan:       {There are no diagnoses linked to this encounter. (Refresh or delete this SmartLink)}   No follow-ups on file.      Future Appointments       Date Provider Specialty Appt Notes    9/5/2024 Mao Kearney MD Orthopedics Pain in my knee             " history of ADHD - previously on meds but does not know which ones    no previous suicide attempts; no previous inpatient psychiatric hospitalizations

## 2024-08-15 NOTE — PROGRESS NOTES
Patient ID: Kayla Piper is a 68 y.o. female.    Chief Complaint: Follow-up    Kayla Piper presents to the clinic today for preoperative evaluation and medication refills    She has been under the care of the following  Vascular: - f/u yearly for history of Right Carotid Bruit  Gastroenterology: Dr. Herron- colonoscopy 2013- reports that it was normal.   Orthopedics: Dr. Kearney  Gynecology: Dr. Tracy Keene- [facility]   Patient Active Problem List  Diagnosis  ·           Rhinitis  ·           Cervical disc displacement  ·           Cervical radiculopathy  ·           Chronic hoarseness  ·           ETD (eustachian tube dysfunction)  ·           Laryngopharyngeal reflux (LPR)  ·           Depression  ·           Hypertension  ·           Insomnia  ·           Gastroesophageal reflux disease without esophagitis  ·           Carotid stenosis, right    She is in need of her Temazepam refill. She is on Temazepam for history of insomnia.  reviewed and is consistent with patient's history without any discrepancies/early fills.     Reports undergoing Wedge BX in June with Dr. Keene- she is scheduled for a Total abdominal hysterectomy on 8/21/2024  Reports just recently completing a surgical clearance to include Chest x ray and EKG at [facility] in [place]- will request records for review.           Past Medical History:   Diagnosis Date    Carotid stenosis, right     DDD (degenerative disc disease), cervical     Depression     Hyperkalemia     Hypertension     Insomnia     Rhinitis     Synovial cyst of popliteal space                 Current Outpatient Medications:     albuterol (PROVENTIL/VENTOLIN HFA) 90 mcg/actuation inhaler, USE 2 INHALATIONS EVERY 4 TO 6 HOURS AS NEEDED FOR SHORTNESS OF BREATH, Disp: 25.5 g, Rfl: 3    allopurinoL (ZYLOPRIM) 300 MG tablet, Take 1 tablet (300 mg total) by mouth once daily., Disp: 90 tablet, Rfl: 3    amLODIPine (NORVASC) 10 MG  tablet, Take 1 tablet (10 mg total) by mouth once daily., Disp: 90 tablet, Rfl: 3    aspirin 325 MG tablet, Take 325 mg by mouth once daily., Disp: , Rfl:     cetirizine (ZYRTEC) 10 MG tablet, Take 10 mg by mouth once daily., Disp: , Rfl:     EScitalopram oxalate (LEXAPRO) 20 MG tablet, Take 1 tablet (20 mg total) by mouth once daily., Disp: 90 tablet, Rfl: 3    fluticasone propionate (FLONASE) 50 mcg/actuation nasal spray, 1 spray by Each Nare route once daily., Disp: , Rfl:     gabapentin (NEURONTIN) 300 MG capsule, Take 1 capsule (300 mg total) by mouth every evening., Disp: 90 capsule, Rfl: 1    multivitamin (THERAGRAN) per tablet, Take 1 tablet by mouth once daily., Disp: , Rfl:     omeprazole (PRILOSEC) 40 MG capsule, Take 1 capsule (40 mg total) by mouth once daily., Disp: 90 capsule, Rfl: 3    progesterone (PROMETRIUM) 200 MG capsule, Take 1 capsule by mouth once daily., Disp: , Rfl:     rosuvastatin (CRESTOR) 10 MG tablet, Take 1 tablet (10 mg total) by mouth once daily., Disp: 90 tablet, Rfl: 3    temazepam (RESTORIL) 30 mg capsule, TAKE 1 CAPSULE(30 MG) BY MOUTH EVERY NIGHT AS NEEDED FOR INSOMNIA, Disp: 30 capsule, Rfl: 0    valsartan-hydrochlorothiazide (DIOVAN-HCT) 320-25 mg per tablet, Take 1 tablet by mouth once daily., Disp: 90 tablet, Rfl: 3    ibuprofen (ADVIL,MOTRIN) 600 MG tablet, Take 1 tablet (600 mg total) by mouth 3 (three) times daily as needed for Pain., Disp: 30 tablet, Rfl: 0    The 10-year ASCVD risk score (Omar DK, et al., 2019) is: 7.8%    Values used to calculate the score:      Age: 68 years      Sex: Female      Is Non- : No      Diabetic: No      Tobacco smoker: No      Systolic Blood Pressure: 132 mmHg      Is BP treated: Yes      HDL Cholesterol: 47 mg/dL      Total Cholesterol: 136 mg/dL     Wt Readings from Last 3 Encounters:   08/15/24 70.6 kg (155 lb 9.6 oz)   05/30/24 74.7 kg (164 lb 10.9 oz)   04/23/24 74.7 kg (164 lb 11.2 oz)     Temp Readings  from Last 3 Encounters:   04/23/24 97.5 °F (36.4 °C) (Oral)   12/19/23 97.7 °F (36.5 °C)   10/23/23 98.2 °F (36.8 °C) (Skin)     BP Readings from Last 3 Encounters:   08/15/24 132/78   04/23/24 (!) 144/76   01/16/24 (!) 156/78     Pulse Readings from Last 3 Encounters:   08/15/24 65   04/23/24 74   01/16/24 72     Resp Readings from Last 3 Encounters:   08/15/24 12   05/30/24 18   04/23/24 12     PF Readings from Last 3 Encounters:   No data found for PF     SpO2 Readings from Last 3 Encounters:   08/15/24 98%   04/23/24 99%   01/16/24 97%        Lab Results   Component Value Date    HGBA1C 5.7 (H) 07/05/2022    HGBA1C 5.6 03/02/2021    HGBA1C 5.9 (H) 06/04/2020     Lab Results   Component Value Date    LDLCALC 75.2 07/05/2022    CREATININE 0.9 09/14/2023       Review of Systems   Constitutional: Negative.    HENT: Negative.     Eyes: Negative.    Respiratory:  Negative for chest tightness, shortness of breath and wheezing.    Cardiovascular:  Negative for chest pain and palpitations.   Gastrointestinal:  Negative for abdominal pain, blood in stool and rectal pain.   Genitourinary:  Negative for difficulty urinating, dysuria, hematuria and urgency.   Musculoskeletal: Negative.    Skin: Negative.    Allergic/Immunologic: Negative.    Neurological:  Negative for dizziness, weakness and light-headedness.   Hematological: Negative.    Psychiatric/Behavioral: Negative.             Objective:      Physical Exam  Constitutional:       General: She is not in acute distress.     Appearance: Normal appearance.   Eyes:      Extraocular Movements: Extraocular movements intact.      Conjunctiva/sclera: Conjunctivae normal.   Cardiovascular:      Rate and Rhythm: Normal rate and regular rhythm.      Heart sounds: Normal heart sounds. No murmur heard.  Pulmonary:      Effort: Pulmonary effort is normal. No respiratory distress.      Breath sounds: Normal breath sounds. No wheezing.   Musculoskeletal:      Right lower leg: No  edema.      Left lower leg: No edema.   Skin:     General: Skin is warm and dry.      Findings: No rash.   Neurological:      Mental Status: She is alert and oriented to person, place, and time.   Psychiatric:         Mood and Affect: Mood normal.         Behavior: Behavior normal.         Thought Content: Thought content normal.         Judgment: Judgment normal.             Screening recommendations appropriate to age and health status were reviewed.    Pre-operative clearance  -     Comprehensive metabolic panel; Future; Expected date: 08/15/2024  -     CBC auto differential; Future; Expected date: 08/15/2024    Encounter for lipid screening for cardiovascular disease  -     Lipid panel; Future; Expected date: 08/15/2024    Encounter for screening examination for impaired glucose regulation and diabetes mellitus  -     Hemoglobin A1C; Future; Expected date: 08/15/2024    White coat syndrome with diagnosis of hypertension  -     Comprehensive metabolic panel; Future; Expected date: 08/15/2024    Other abnormal glucose  -     Hemoglobin A1C; Future; Expected date: 08/15/2024    Fatigue, unspecified type  -     CBC auto differential; Future; Expected date: 08/15/2024  -     TSH; Future; Expected date: 08/15/2024    Hyperuricemia  -     Uric acid; Future; Expected date: 08/15/2024    Long-term current use of benzodiazepine  -     POCT Urine Drug Screen (With BUP)    Dark brown urine  -     POCT Urinalysis(Instrument)  -     Urine culture        RCRI risk factors include: no known RCRI risk factors. As such, per RCRI the risk of cardiac death, nonfatal myocardial infarction, or nonfatal cardiac arrest is 2.4% and the risk of myocardial infarction, pulmonary edema, ventricular fibrillation, primary cardiac arrest, or complete heart block is 3.6%.  Overall this patient can be considered low risk for this low risk procedure. No further cardiac testing is recommended at this time.     Patient denies any symptoms (as per  HPI) concerning for undiagnosed lung disease including DANE. Would not recommend obtaining chest X-ray, sleep study, or PFTs at this time. Patient is a non-smoker. We discussed the benefits of early mobilization and deep breathing after surgery.      Screened patient for alcohol misuse, use of illicit drugs, and personal or family history of anesthetic complications or bleeding diathesis and no substantial concerns were identified.     All current medications were reviewed and at this time no changes to medications are recommended prior to surgery.     I recommend use of standard pre-op and post-op precautions for this patient. In my opinion, she is medically optimized for this procedure, and can proceed without further evaluation.

## 2024-08-18 LAB — BACTERIA UR CULT: ABNORMAL

## 2024-08-19 ENCOUNTER — TELEPHONE (OUTPATIENT)
Dept: FAMILY MEDICINE | Facility: CLINIC | Age: 69
End: 2024-08-19
Payer: MEDICARE

## 2024-08-19 DIAGNOSIS — N30.00 ACUTE CYSTITIS WITHOUT HEMATURIA: Primary | ICD-10-CM

## 2024-08-19 RX ORDER — NITROFURANTOIN 25; 75 MG/1; MG/1
100 CAPSULE ORAL 2 TIMES DAILY
Qty: 10 CAPSULE | Refills: 0 | Status: SHIPPED | OUTPATIENT
Start: 2024-08-19 | End: 2024-08-24

## 2024-08-19 NOTE — TELEPHONE ENCOUNTER
----- Message from Ellis Lopez sent at 8/19/2024  2:43 PM CDT -----  Contact: self  Type:  Patient Returning Call    Who Called:  PT  Who Left Message for Patient:  Dr. Johnson??  Does the patient know what this is regarding?:  n/a  Best Call Back Number:  [phone]   Additional Information:  Msg said for her to call Janet Aggarwal office regarding med.  Please advise.

## 2024-08-19 NOTE — TELEPHONE ENCOUNTER
"Spoke with pt. Pt states surgeon has not placed her on abx     Please advise      Kayla,      "Your recent blood work results looked good. Your urine was dark/cloudy in clinic. You mentioned that your provider a few days prior had done a urinalysis as well. Did they send you in any antibiotics?  Sincerely,     Janet Aggarwal, JAI-C"  "

## 2024-08-20 DIAGNOSIS — G47.00 INSOMNIA, UNSPECIFIED TYPE: ICD-10-CM

## 2024-08-20 DIAGNOSIS — Z79.899 LONG-TERM CURRENT USE OF BENZODIAZEPINE: ICD-10-CM

## 2024-08-20 RX ORDER — TEMAZEPAM 30 MG/1
30 CAPSULE ORAL NIGHTLY PRN
Qty: 30 CAPSULE | Refills: 0 | Status: SHIPPED | OUTPATIENT
Start: 2024-08-20

## 2024-08-22 ENCOUNTER — TELEPHONE (OUTPATIENT)
Dept: FAMILY MEDICINE | Facility: CLINIC | Age: 69
End: 2024-08-22
Payer: MEDICARE

## 2024-09-05 ENCOUNTER — OFFICE VISIT (OUTPATIENT)
Dept: ORTHOPEDICS | Facility: CLINIC | Age: 69
End: 2024-09-05
Payer: MEDICARE

## 2024-09-05 VITALS — WEIGHT: 155.63 LBS | BODY MASS INDEX: 26.57 KG/M2 | HEIGHT: 64 IN | RESPIRATION RATE: 18 BRPM

## 2024-09-05 DIAGNOSIS — Z96.651 STATUS POST TOTAL PROSTHETIC REPLACEMENT OF KNEE JOINT USING CEMENT, RIGHT: Primary | ICD-10-CM

## 2024-09-05 DIAGNOSIS — M76.51 PATELLAR TENDINITIS, RIGHT KNEE: ICD-10-CM

## 2024-09-05 DIAGNOSIS — Z96.651 PRESENCE OF RIGHT ARTIFICIAL KNEE JOINT: Primary | ICD-10-CM

## 2024-09-05 PROCEDURE — 99213 OFFICE O/P EST LOW 20 MIN: CPT | Mod: PBBFAC,PO | Performed by: ORTHOPAEDIC SURGERY

## 2024-09-05 PROCEDURE — 99999 PR PBB SHADOW E&M-EST. PATIENT-LVL III: CPT | Mod: PBBFAC,,, | Performed by: ORTHOPAEDIC SURGERY

## 2024-09-05 PROCEDURE — 99214 OFFICE O/P EST MOD 30 MIN: CPT | Mod: S$PBB,,, | Performed by: ORTHOPAEDIC SURGERY

## 2024-09-05 NOTE — PROGRESS NOTES
Past Medical History:   Diagnosis Date    Carotid stenosis, right     DDD (degenerative disc disease), cervical     Depression     Hyperkalemia     Hypertension     Insomnia     Rhinitis     Synovial cyst of popliteal space        Past Surgical History:   Procedure Laterality Date    ANTERIOR CERVICAL DISCECTOMY W/ FUSION      APPENDECTOMY      AUGMENTATION OF BREAST      CAROTID ENDARTERECTOMY Right 10/03/2018    COLONOSCOPY  2013    COLONOSCOPY N/A 12/19/2023    Procedure: COLONOSCOPY;  Surgeon: Martin Carvalho MD;  Location: [facility] END[facility];  Service: Endoscopy;  Laterality: N/A;    ESOPHAGOGASTRODUODENOSCOPY N/A 10/23/2023    Procedure: EGD (ESOPHAGOGASTRODUODENOSCOPY);  Surgeon: Martin Carvalho MD;  Location: [facility] ENDO;  Service: Endoscopy;  Laterality: N/A;    KNEE SURGERY Right     REPAIR MENISCUS SCOPE    ROBOTIC ARTHROPLASTY, KNEE Right 09/26/2023    Procedure: ROBOTIC ARTHROPLASTY, KNEE, TOTAL;  Surgeon: Mao Kearney MD;  Location: [facility];  Service: Orthopedics;  Laterality: Right;    TUBAL LIGATION  2000    UMBILICAL HERNIA REPAIR  12/14/2022       Current Outpatient Medications   Medication Sig    albuterol (PROVENTIL/VENTOLIN HFA) 90 mcg/actuation inhaler USE 2 INHALATIONS EVERY 4 TO 6 HOURS AS NEEDED FOR SHORTNESS OF BREATH    allopurinoL (ZYLOPRIM) 300 MG tablet Take 1 tablet (300 mg total) by mouth once daily.    amLODIPine (NORVASC) 10 MG tablet Take 1 tablet (10 mg total) by mouth once daily.    aspirin 325 MG tablet Take 325 mg by mouth once daily.    cetirizine (ZYRTEC) 10 MG tablet Take 10 mg by mouth once daily.    EScitalopram oxalate (LEXAPRO) 20 MG tablet Take 1 tablet (20 mg total) by mouth once daily.    fluticasone propionate (FLONASE) 50 mcg/actuation nasal spray 1 spray by Each Nare route once daily.    gabapentin (NEURONTIN) 300 MG capsule Take 1 capsule (300 mg total) by mouth every evening.    ibuprofen (ADVIL,MOTRIN) 600 MG tablet Take 1 tablet (600 mg total) by mouth  3 (three) times daily as needed for Pain.    multivitamin (THERAGRAN) per tablet Take 1 tablet by mouth once daily.    omeprazole (PRILOSEC) 40 MG capsule Take 1 capsule (40 mg total) by mouth once daily.    progesterone (PROMETRIUM) 200 MG capsule Take 1 capsule by mouth once daily.    rosuvastatin (CRESTOR) 10 MG tablet Take 1 tablet (10 mg total) by mouth once daily.    temazepam (RESTORIL) 30 mg capsule Take 1 capsule (30 mg total) by mouth nightly as needed for Insomnia.    valsartan-hydrochlorothiazide (DIOVAN-HCT) 320-25 mg per tablet Take 1 tablet by mouth once daily.     No current facility-administered medications for this visit.       Review of patient's allergies indicates:  No Known Allergies    Family History   Problem Relation Name Age of Onset    Hypertension Mother      Coronary artery disease Mother      Lung cancer Father      Hypertension Sister         Social History     Socioeconomic History    Marital status:    Occupational History    Occupation:    Tobacco Use    Smoking status: Former     Current packs/day: 0.00     Average packs/day: 0.3 packs/day for 18.0 years (4.5 ttl pk-yrs)     Types: Cigarettes     Start date:      Quit date:      Years since quittin.6    Smokeless tobacco: Never   Substance and Sexual Activity    Alcohol use: Not Currently     Comment: occasional    Drug use: No    Sexual activity: Yes     Partners: Male     Birth control/protection: None     Comment:    Social History Narrative    Live with      Social Determinants of Health     Financial Resource Strain: Low Risk  (2024)    Overall Financial Resource Strain (CARDIA)     Difficulty of Paying Living Expenses: Not very hard   Food Insecurity: No Food Insecurity (2024)    Hunger Vital Sign     Worried About Running Out of Food in the Last Year: Never true     Ran Out of Food in the Last Year: Never true   Transportation Needs: No Transportation Needs  (4/22/2024)    PRAPARE - Transportation     Lack of Transportation (Medical): No     Lack of Transportation (Non-Medical): No   Physical Activity: Insufficiently Active (4/22/2024)    Exercise Vital Sign     Days of Exercise per Week: 3 days     Minutes of Exercise per Session: 20 min   Stress: Stress Concern Present (4/22/2024)    [employer] [place] [employer] - Occupational Stress Questionnaire     Feeling of Stress : Very much   Housing Stability: Low Risk  (6/7/2022)    Housing Stability Vital Sign     Unable to Pay for Housing in the Last Year: No     Number of Places Lived in the Last Year: 1     Unstable Housing in the Last Year: No       Chief Complaint:   No chief complaint on file.      Date of surgery:  September 26, 2023    History of present illness:  68-year-old female underwent right total knee arthroplasty.  Has a lot of pain right around the patellar tendon.  Patient was on gabapentin prior to the surgery.  She continues to take it.  Pain mostly at the end of the day or with going up and down stairs.  Pain with hyperflexion past 110° as well      Review of Systems:  Musculoskeletal:  See HPI        Physical Examination:    Vital Signs:    There were no vitals filed for this visit.        There is no height or weight on file to calculate BMI.    This a well-developed, well nourished patient in no acute distress.  They are alert and oriented and cooperative to examination.  Pt. walks without an antalgic gait.      Examination of the right knee shows healed surgical incision.  No erythema drainage.  Patient has range of motion from 0° to 120°.  A lot of irritability and hypersensitivity right over the patellar tendon and inferior pole of the patella.   Mild fullness of her knee in general.  Stable to varus and valgus stress.  Negative drawer exam.    X-rays:  4 views of the right knee are reviewed which show well-aligned right total knee arthroplasty components without complication      Assessment::  Status post right total knee arthroplasty  Right patellar tendinitis    Plan:  I recommended an MRI with metal suppression for patellar tendinitis.  She tried physical therapy and it did not really help at all.    This note was created using M Modal voice recognition software that occasionally misinterpreted phrases or words.

## 2024-09-06 ENCOUNTER — TELEPHONE (OUTPATIENT)
Dept: FAMILY MEDICINE | Facility: CLINIC | Age: 69
End: 2024-09-06
Payer: MEDICARE

## 2024-09-06 NOTE — TELEPHONE ENCOUNTER
----- Message from Kristan Aguirre sent at 9/6/2024 12:59 PM CDT -----  Regarding: adv ise  Contact: patient  Type: Needs Medical Advice  Who Called:  patient   Symptoms (please be specific):    How long has patient had these symptoms:    Pharmacy name and phone #:    [facility] [place], [place] - [address]  [address] [address]  Phone: [phone] Fax: [phone]    [id number] - [place], [place] - [address] [facility] [address]  [address]  [place] [address]  Phone: [phone] Fax: [phone]          Best Call Back Number: [phone]    Additional Information: temazepam (RESTORIL) 30 mg capsule the following is not covered by insurance call to advise on an alternative that can be called in pt doesn't matter which pharmacy just advise which one it was sent to

## 2024-09-06 NOTE — TELEPHONE ENCOUNTER
Attempted to contact pt   Pt will need to contact insurance to obtain list of alternative prescriptions covered as emazepam (RESTORIL) 30 mg capsule is not covered.

## 2024-09-12 ENCOUNTER — HOSPITAL ENCOUNTER (OUTPATIENT)
Dept: RADIOLOGY | Facility: HOSPITAL | Age: 69
Discharge: HOME | End: 2024-09-12
Attending: ORTHOPAEDIC SURGERY
Payer: MEDICARE

## 2024-09-12 DIAGNOSIS — Z96.651 PRESENCE OF RIGHT ARTIFICIAL KNEE JOINT: ICD-10-CM

## 2024-09-12 DIAGNOSIS — M76.51 PATELLAR TENDINITIS, RIGHT KNEE: ICD-10-CM

## 2024-09-12 PROCEDURE — 73721 MRI JNT OF LWR EXTRE W/O DYE: CPT | Mod: 26,RT,, | Performed by: RADIOLOGY

## 2024-09-12 PROCEDURE — 73721 MRI JNT OF LWR EXTRE W/O DYE: CPT | Mod: TC,RT

## 2024-09-16 ENCOUNTER — OFFICE VISIT (OUTPATIENT)
Dept: ORTHOPEDICS | Facility: CLINIC | Age: 69
End: 2024-09-16
Payer: MEDICARE

## 2024-09-16 VITALS — HEIGHT: 64 IN | WEIGHT: 155.63 LBS | BODY MASS INDEX: 26.57 KG/M2

## 2024-09-16 DIAGNOSIS — Z96.651 PRESENCE OF RIGHT ARTIFICIAL KNEE JOINT: Primary | ICD-10-CM

## 2024-09-16 DIAGNOSIS — M76.51 PATELLAR TENDINITIS, RIGHT KNEE: ICD-10-CM

## 2024-09-16 PROCEDURE — 99212 OFFICE O/P EST SF 10 MIN: CPT | Mod: PBBFAC,PO | Performed by: ORTHOPAEDIC SURGERY

## 2024-09-16 PROCEDURE — 99214 OFFICE O/P EST MOD 30 MIN: CPT | Mod: S$PBB,,, | Performed by: ORTHOPAEDIC SURGERY

## 2024-09-16 PROCEDURE — 99999 PR PBB SHADOW E&M-EST. PATIENT-LVL II: CPT | Mod: PBBFAC,,, | Performed by: ORTHOPAEDIC SURGERY

## 2024-09-16 NOTE — PROGRESS NOTES
Past Medical History:   Diagnosis Date    Carotid stenosis, right     DDD (degenerative disc disease), cervical     Depression     Hyperkalemia     Hypertension     Insomnia     Rhinitis     Synovial cyst of popliteal space        Past Surgical History:   Procedure Laterality Date    ANTERIOR CERVICAL DISCECTOMY W/ FUSION      APPENDECTOMY      AUGMENTATION OF BREAST      CAROTID ENDARTERECTOMY Right 10/03/2018    COLONOSCOPY  2013    COLONOSCOPY N/A 12/19/2023    Procedure: COLONOSCOPY;  Surgeon: Martin Carvalho MD;  Location: [facility];  Service: Endoscopy;  Laterality: N/A;    ESOPHAGOGASTRODUODENOSCOPY N/A 10/23/2023    Procedure: EGD (ESOPHAGOGASTRODUODENOSCOPY);  Surgeon: Martin Carvalho MD;  Location: [facility];  Service: Endoscopy;  Laterality: N/A;    KNEE SURGERY Right     REPAIR MENISCUS SCOPE    ROBOTIC ARTHROPLASTY, KNEE Right 09/26/2023    Procedure: ROBOTIC ARTHROPLASTY, KNEE, TOTAL;  Surgeon: Mao Kearney MD;  Location: [facility];  Service: Orthopedics;  Laterality: Right;    TUBAL LIGATION  2000    UMBILICAL HERNIA REPAIR  12/14/2022       Current Outpatient Medications   Medication Sig    albuterol (PROVENTIL/VENTOLIN HFA) 90 mcg/actuation inhaler USE 2 INHALATIONS EVERY 4 TO 6 HOURS AS NEEDED FOR SHORTNESS OF BREATH    allopurinoL (ZYLOPRIM) 300 MG tablet Take 1 tablet (300 mg total) by mouth once daily.    amitriptyline (ELAVIL) 25 MG tablet Take 1 tablet (25 mg total) by mouth nightly as needed for Insomnia.    amLODIPine (NORVASC) 10 MG tablet Take 1 tablet (10 mg total) by mouth once daily.    aspirin 325 MG tablet Take 325 mg by mouth once daily.    cetirizine (ZYRTEC) 10 MG tablet Take 10 mg by mouth once daily.    EScitalopram oxalate (LEXAPRO) 20 MG tablet Take 1 tablet (20 mg total) by mouth once daily.    fluticasone propionate (FLONASE) 50 mcg/actuation nasal spray 1 spray by Each Nare route once daily.    gabapentin (NEURONTIN) 300 MG capsule Take 1 capsule (300 mg  total) by mouth every evening.    multivitamin (THERAGRAN) per tablet Take 1 tablet by mouth once daily.    omeprazole (PRILOSEC) 40 MG capsule Take 1 capsule (40 mg total) by mouth once daily.    progesterone (PROMETRIUM) 200 MG capsule Take 1 capsule by mouth once daily.    rosuvastatin (CRESTOR) 10 MG tablet Take 1 tablet (10 mg total) by mouth once daily.    temazepam (RESTORIL) 30 mg capsule Take 1 capsule (30 mg total) by mouth nightly as needed for Insomnia.    valsartan-hydrochlorothiazide (DIOVAN-HCT) 320-25 mg per tablet Take 1 tablet by mouth once daily.     No current facility-administered medications for this visit.       Review of patient's allergies indicates:  No Known Allergies    Family History   Problem Relation Name Age of Onset    Hypertension Mother      Coronary artery disease Mother      Lung cancer Father      Hypertension Sister         Social History     Socioeconomic History    Marital status:    Occupational History    Occupation:    Tobacco Use    Smoking status: Former     Current packs/day: 0.00     Average packs/day: 0.3 packs/day for 18.0 years (4.5 ttl pk-yrs)     Types: Cigarettes     Start date:      Quit date: 2000     Years since quittin.7    Smokeless tobacco: Never   Substance and Sexual Activity    Alcohol use: Not Currently     Comment: occasional    Drug use: No    Sexual activity: Yes     Partners: Male     Birth control/protection: None     Comment:    Social History Narrative    Live with      Social Determinants of Health     Financial Resource Strain: Low Risk  (2024)    Overall Financial Resource Strain (CARDIA)     Difficulty of Paying Living Expenses: Not very hard   Food Insecurity: No Food Insecurity (2024)    Hunger Vital Sign     Worried About Running Out of Food in the Last Year: Never true     Ran Out of Food in the Last Year: Never true   Transportation Needs: No Transportation Needs (2024)     PRAPARE - Transportation     Lack of Transportation (Medical): No     Lack of Transportation (Non-Medical): No   Physical Activity: Insufficiently Active (4/22/2024)    Exercise Vital Sign     Days of Exercise per Week: 3 days     Minutes of Exercise per Session: 20 min   Stress: Stress Concern Present (4/22/2024)    [employer] [place] [employer] - Occupational Stress Questionnaire     Feeling of Stress : Very much   Housing Stability: Low Risk  (6/7/2022)    Housing Stability Vital Sign     Unable to Pay for Housing in the Last Year: No     Number of Places Lived in the Last Year: 1     Unstable Housing in the Last Year: No       Chief Complaint:   No chief complaint on file.      Date of surgery:  September 26, 2023    History of present illness:  68-year-old female underwent right total knee arthroplasty.  Has a lot of pain right around the patellar tendon.  Patient was on gabapentin prior to the surgery.  She continues to take it.  Pain mostly at the end of the day or with going up and down stairs.  Pain with hyperflexion past 110° as well      Review of Systems:  Musculoskeletal:  See HPI        Physical Examination:    Vital Signs:    There were no vitals filed for this visit.        There is no height or weight on file to calculate BMI.    This a well-developed, well nourished patient in no acute distress.  They are alert and oriented and cooperative to examination.  Pt. walks without an antalgic gait.      Examination of the right knee shows healed surgical incision.  No erythema drainage.  Patient has range of motion from 0° to 120°.  A lot of irritability and hypersensitivity right over the patellar tendon and inferior pole of the patella.   Mild fullness of her knee in general.  Stable to varus and valgus stress.  Negative drawer exam.    X-rays:  4 views of the right knee are reviewed which show well-aligned right total knee arthroplasty components without complication    MRI of the right knee  is reviewed and interpreted:1. Total knee arthroplasty hardware with small joint effusion.  2. Patellar tendinosis, moderate severity.     Assessment::  Status post right total knee arthroplasty  Right patellar tendinitis    Plan:  I reviewed the MRI with her today.  Patient has moderate to severe patellar tendinosis.  Recommended consultation with Dr. Salas to discuss Tenex.    This note was created using M Modal voice recognition software that occasionally misinterpreted phrases or words.

## 2024-09-18 ENCOUNTER — OFFICE VISIT (OUTPATIENT)
Dept: ORTHOPEDICS | Facility: CLINIC | Age: 69
End: 2024-09-18
Payer: MEDICARE

## 2024-09-18 VITALS — WEIGHT: 155.63 LBS | BODY MASS INDEX: 26.57 KG/M2 | HEIGHT: 64 IN

## 2024-09-18 DIAGNOSIS — M76.51 PATELLAR TENDINITIS, RIGHT KNEE: Primary | ICD-10-CM

## 2024-09-18 DIAGNOSIS — Z96.651 PRESENCE OF RIGHT ARTIFICIAL KNEE JOINT: ICD-10-CM

## 2024-09-18 PROCEDURE — 99999 PR PBB SHADOW E&M-EST. PATIENT-LVL III: CPT | Mod: PBBFAC,,, | Performed by: FAMILY MEDICINE

## 2024-09-18 PROCEDURE — 99214 OFFICE O/P EST MOD 30 MIN: CPT | Mod: S$PBB,,, | Performed by: FAMILY MEDICINE

## 2024-09-18 PROCEDURE — 99213 OFFICE O/P EST LOW 20 MIN: CPT | Mod: PBBFAC,PO | Performed by: FAMILY MEDICINE

## 2024-09-18 NOTE — PROGRESS NOTES
Subjective     Patient ID: Kayla Piper is a 68 y.o. female.    Chief Complaint: Consult (DISCUSS TENEX KNEE Teetee to Josue, discuss tenex patella tendonitis/)      68-year-old female here today with complaints of right-sided knee pain secondary to moderate to severe patellar tendinopathy.  She is status post TKA on right knee done September 26, 2023 by .  She has had pain in the knee since the surgery with limited range of motion. He has not responded to any conservative treatment so far.  Recent MRI was done September 12th of this year that did show some significant patellar tendinopathy.  This is the area of her maximum pain.  He was sent here today to discuss percutaneous tenotomy as a possible treatment option for this.        Review of Systems   Constitutional: Negative for chills and decreased appetite.   HENT:  Negative for congestion and sore throat.    Eyes:  Negative for blurred vision.   Cardiovascular:  Negative for chest pain, dyspnea on exertion and palpitations.   Respiratory:  Negative for cough and shortness of breath.    Skin:  Negative for rash.   Neurological:  Negative for difficulty with concentration, disturbances in coordination and headaches.   Psychiatric/Behavioral:  Negative for altered mental status, depression, hallucinations, memory loss and suicidal ideas.           Objective     General    Nursing note and vitals reviewed.  Constitutional: She is oriented to person, place, and time. She appears well-developed and well-nourished.   HENT:   Nose: Nose normal.   Eyes: EOM are normal. Pupils are equal, round, and reactive to light.   Neck: Neck supple.   Cardiovascular:  Normal rate.            Pulmonary/Chest: Effort normal.   Abdominal: Soft.   Neurological: She is alert and oriented to person, place, and time. She has normal reflexes.   Psychiatric: She has a normal mood and affect. Her behavior is normal. Judgment and thought content normal.           Right  Knee Exam     Inspection   Scars: present  Effusion: absent    Tenderness   The patient is tender to palpation of the patellar tendon.    Crepitus   The patient has crepitus of the patella.    Range of Motion   Extension:  50   Flexion:  120     Tests   Meniscus   Addie:  Medial - negative Lateral - negative  Ligament Examination   Lachman: normal (-1 to 2mm)   PCL-Posterior Drawer: normal (0 to 2mm)     MCL - Valgus: normal (0 to 2mm)  LCL - Varus: normal  Patella   Patellar apprehension: negative  Passive Patellar Tilt: neutral  Patellar Tracking: normal  J-Sign: none    Other   Popliteal (Baker's) Cyst: absent  Sensation: normal    Left Knee Exam   Left knee exam is normal.    Range of Motion   Extension:  normal   Flexion:  normal     Tests   Stability   Lachman: normal (-1 to 2mm)   PCL-Posterior Drawer: normal (0 to 2mm)  MCL - Valgus: normal (0 to 2mm)  LCL - Varus: normal (0 to 2mm)    Muscle Strength   Right Lower Extremity   Quadriceps:  5/5   Hamstrin/5     Vascular Exam     Right Pulses  Dorsalis Pedis:      2+            Physical Exam  Vitals and nursing note reviewed.   Constitutional:       Appearance: She is well-developed and well-nourished.   HENT:      Nose: Nose normal.   Eyes:      Extraocular Movements: EOM normal.      Pupils: Pupils are equal, round, and reactive to light.   Cardiovascular:      Rate and Rhythm: Normal rate.      Pulses:           Dorsalis pedis pulses are 2+ on the right side.   Pulmonary:      Effort: Pulmonary effort is normal.   Abdominal:      Palpations: Abdomen is soft.   Musculoskeletal:      Cervical back: Normal range of motion and neck supple.      Right knee: No effusion.      Instability Tests: Medial Addie test negative and lateral Addie test negative.   Neurological:      Mental Status: She is alert and oriented to person, place, and time.      Deep Tendon Reflexes: Reflexes are normal and symmetric.   Psychiatric:         Mood and Affect: Mood  and affect normal.         Behavior: Behavior normal.         Thought Content: Thought content normal.         Judgment: Judgment normal.        EXAMINATION:  MRI KNEE WITHOUT CONTRAST RIGHT     CLINICAL HISTORY:  Knee replacement, soft-tissue abnormality suspected;     TECHNIQUE:  Routine multiplanar multisequence MRI evaluation of the right knee.     COMPARISON:  12/18/2023 and 09/14/2023     FINDINGS:  There is extensive susceptibility artifact related to arthroplasty hardware which limits the diagnostic quality of the exam.     Bones: Total knee arthroplasty hardware.  No acute fracture AVN or marrow replacement allowing for exam limitations.     Joint: There is a joint effusion and no malalignment.     Cruciate Ligaments: Poor visualization due to artifact     Collateral Ligaments: No acute finding allowing for exam limitations.     Extensor Mechanism: There is thickening of the patellar tendon, with some intermediate signal in the tendon distally, and edema along the infrapatellar fat.  Quadriceps and retinacula are intact where not limited by artifact.     Soft tissues: No Baker cyst.     Impression:     Limited exam as described.     1. Total knee arthroplasty hardware with small joint effusion.  2. Patellar tendinosis, moderate severity.        Assessment and Plan     Encounter Diagnoses   Name Primary?    Presence of right artificial knee joint     Patellar tendinitis, right knee Yes         Kayla was seen today for consult.    Diagnoses and all orders for this visit:    Patellar tendinitis, right knee  -     Tenotomy femur and knee, single tendon; Future    Presence of right artificial knee joint  -     Tenotomy femur and knee, single tendon; Future      I reviewed the MRI findings and images as well as all relevant past notes in clinic today.  Based on all this information in the physical exam today I do believe she would be a good candidate for percutaneous tenotomy of the patellar tendon.  I discussed  the procedure with her in detail.  Going to submit a prior authorization for this and have her follow up once approved for the procedure.

## 2024-10-03 ENCOUNTER — OFFICE VISIT (OUTPATIENT)
Dept: ORTHOPEDICS | Facility: CLINIC | Age: 69
End: 2024-10-03
Payer: MEDICARE

## 2024-10-03 ENCOUNTER — E-VISIT (OUTPATIENT)
Dept: FAMILY MEDICINE | Facility: CLINIC | Age: 69
End: 2024-10-03
Payer: MEDICARE

## 2024-10-03 VITALS — WEIGHT: 155.63 LBS | BODY MASS INDEX: 26.57 KG/M2 | HEIGHT: 64 IN

## 2024-10-03 DIAGNOSIS — G89.18 POST PROCEDURE DISCOMFORT: Primary | ICD-10-CM

## 2024-10-03 DIAGNOSIS — Z87.19 HISTORY OF CHRONIC CONSTIPATION: Primary | ICD-10-CM

## 2024-10-03 DIAGNOSIS — M76.51 PATELLAR TENDINITIS, RIGHT KNEE: ICD-10-CM

## 2024-10-03 DIAGNOSIS — Z96.651 PRESENCE OF RIGHT ARTIFICIAL KNEE JOINT: ICD-10-CM

## 2024-10-03 PROCEDURE — 99999 PR PBB SHADOW E&M-EST. PATIENT-LVL III: CPT | Mod: PBBFAC,,, | Performed by: FAMILY MEDICINE

## 2024-10-03 PROCEDURE — 99213 OFFICE O/P EST LOW 20 MIN: CPT | Mod: PBBFAC,PO | Performed by: FAMILY MEDICINE

## 2024-10-03 PROCEDURE — 76881 US COMPL JOINT R-T W/IMG: CPT | Mod: PBBFAC,PO | Performed by: FAMILY MEDICINE

## 2024-10-03 PROCEDURE — 27306 INCISION OF THIGH TENDON: CPT | Mod: PBBFAC,PO | Performed by: FAMILY MEDICINE

## 2024-10-03 RX ORDER — HYDROCODONE BITARTRATE AND ACETAMINOPHEN 5; 325 MG/1; MG/1
1 TABLET ORAL EVERY 6 HOURS PRN
Qty: 20 TABLET | Refills: 0 | Status: SHIPPED | OUTPATIENT
Start: 2024-10-03

## 2024-10-03 RX ORDER — IBUPROFEN 800 MG/1
800 TABLET ORAL 3 TIMES DAILY PRN
Qty: 30 TABLET | Refills: 0 | Status: SHIPPED | OUTPATIENT
Start: 2024-10-03

## 2024-10-03 NOTE — PROGRESS NOTES
Subjective:       Patient ID: Kayla Piper is a 69 y.o. female.    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, right       Objective:      Physical Exam    Lab Results   Component Value Date    WBC 8.66 08/15/2024    HGB 13.4 08/15/2024    HCT 42.0 08/15/2024     08/15/2024    CHOL 126 08/15/2024    TRIG 75 08/15/2024    HDL 47 08/15/2024    ALT 13 08/15/2024    AST 16 08/15/2024     08/15/2024    K 4.2 08/15/2024     08/15/2024    CREATININE 1.0 08/15/2024    BUN 22 08/15/2024    CO2 29 08/15/2024    TSH 1.539 08/15/2024    HGBA1C 4.9 08/15/2024     The ASCVD Risk score (Omar DK, et al., 2019) failed to calculate for the following reasons:    The valid total cholesterol range is 130 to 320 mg/dL  There were no vitals taken for this visit.   Assessment:       1. History of chronic constipation        Plan:       1. History of chronic constipation  Linzess submitted to pharmacy- costly medication, alternatives Amitiza, Miralax are all providing same tier.   Encouraged to reach out to insurance directly to discuss medications/tiers      No follow-ups on file.      Future Appointments       Date Provider Specialty Appt Notes    10/3/2024  Administration     10/25/2024 Blas Salas MD Orthopedics 3 WEEK L) NEE TENEX F/U    11/19/2024 Janet Aggarwal NP Family Medicine 3 month med refill

## 2024-10-03 NOTE — PROGRESS NOTES
Subjective:       Patient ID: Kayla Piper is a 69 y.o. female.    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, right       Objective:      Physical Exam    Lab Results   Component Value Date    WBC 8.66 08/15/2024    HGB 13.4 08/15/2024    HCT 42.0 08/15/2024     08/15/2024    CHOL 126 08/15/2024    TRIG 75 08/15/2024    HDL 47 08/15/2024    ALT 13 08/15/2024    AST 16 08/15/2024     08/15/2024    K 4.2 08/15/2024     08/15/2024    CREATININE 1.0 08/15/2024    BUN 22 08/15/2024    CO2 29 08/15/2024    TSH 1.539 08/15/2024    HGBA1C 4.9 08/15/2024     The ASCVD Risk score (Omar GEIGER, et al., 2019) failed to calculate for the following reasons:    The valid total cholesterol range is 130 to 320 mg/dL  There were no vitals taken for this visit.   Assessment:       1. History of chronic constipation        Plan:       1. History of chronic constipation  -     linaCLOtide (LINZESS) 72 mcg Cap capsule; Take 1 capsule (72 mcg total) by mouth before breakfast.  Dispense: 30 capsule; Refill: 5     Linzess was sent to your pharmacy.  Take once daily in the morning about 30 minutes before breakfast. It can take 2 weeks before your symptoms improve. If you develop abdominal pain, severe diarrhea, of feeling like you will pass out please seek further evaluation at the local ER. Common side effects of the  medication can include abdominal discomfort/bloating, increased gas or loose stools.   Maintain hydration.  No follow-ups on file.      Future Appointments       Date Provider Specialty Appt Notes    10/25/2024 Blas Salas MD Orthopedics 3 WEEK L) NEE TENEX F/U    11/19/2024 Janet Aggarwal NP Family Medicine 3 month med refill

## 2024-10-03 NOTE — PROGRESS NOTES
TENEX R Patellar Tendon    Date/Time: 10/3/2024 10:00 AM    Performed by: Blas Salas MD  Authorized by: Blas Salas MD    Consent Done?:  Yes (Written)  Indications:  Arthritis and pain  Site marked: the procedure site was marked    Timeout: prior to procedure the correct patient, procedure, and site was verified    Prep: patient was prepped and draped in usual sterile fashion      Local anesthesia used?: Yes    Local anesthetic:  Lidocaine 1% without epinephrine and bupivacaine 0.5% without epinephrine  Anesthetic total (ml):  5      Details:  Needle Size:  22 G and 20 G  Ultrasonic Guidance for needle placement?: Yes    Images are saved and documented.  Approach:  Anterior  Location:  Knee  Knee joint: R Patellar Tendon.  Patient tolerance:  Patient tolerated the procedure well with no immediate complications      Patient was brought into the procedure room and placed on the operating table in a supine position with knee flexed at 90 degrees and suported. A sterile sleeve was placed over the ultrasound transducer. The anatomy was identified, and the diseased tissue was visualized or confirmed. The ultrasonic sharp cutting tool was setup for use and a 500cc irrigation bag was installed. The aspiration and irrigation system was primed to confirm appropriate function. After an appropriate timeout was performed for the left  lower extremity, the area was prepped with an antimicrobial solution, draped and the area was injected a local anesthetic. A skin wheal was placed, and the involved tissue was anesthetized with fast-acting local anesthetic.      Ultrasound imaging was utilized to visualize the patellar tendon. The diseased and frayed portions of the tendon were identified. With this ultrasound guidance, a small incision was made and carried through the skin and subcutaneous tissue - long enough to accommodate the ultrasonic sharp cutting tool over the area of the diseased tendon.  Care was taken to avoid all  vital neurovascular structures and any small bleeders were cauterized. The ultrasonic sharp cutting tool was then inserted through the incision and, with ultrasound imaging guidance, advanced to the level of the pathologic tendon.      The tenotomy was performed by applying the ultrasonic sharp cutting tool to all areas of diseased and frayed tendon to cut away and remove pathologic tendon. Ultrasound imaging was used to guide this. This was continued until all frayed and loose tendon was removed. Applied cutting time was 2:33.  After the sharp cutting was complete, ultrasound imaging was used to ensure all areas of diseased and frayed tendon were addressed. Ultrasound was utilized throughout the procedure to confirm precise localization.      Upon completion of the procedure the surgical site was thoroughly irrigated and aspirated to ensure soft tissue debris removal. The skin incision was closed using steri-strips and dermabond and a sterile, water impervious dressing was applied, followed by a sterile 4x4, sterile Kerlix, and non-sterile ace bandage. The patient tolerated the procedure well and transferred to the recovery room in good condition. Detailed post- procedure instructions and a return appointment were given. The patient was advised follow all post-procedure protocols, take post-procedure pain medication as needed, and to return to clinic at 3 weeks .

## 2024-10-04 RX ORDER — POLYETHYLENE GLYCOL 3350 17 G/17G
17 POWDER, FOR SOLUTION ORAL DAILY
Qty: 1530 G | Refills: 1 | Status: SHIPPED | OUTPATIENT
Start: 2024-10-04

## 2024-10-25 ENCOUNTER — OFFICE VISIT (OUTPATIENT)
Dept: ORTHOPEDICS | Facility: CLINIC | Age: 69
End: 2024-10-25
Payer: MEDICARE

## 2024-10-25 VITALS — WEIGHT: 155.63 LBS | BODY MASS INDEX: 26.57 KG/M2 | HEIGHT: 64 IN

## 2024-10-25 DIAGNOSIS — G89.18 POST PROCEDURE DISCOMFORT: ICD-10-CM

## 2024-10-25 DIAGNOSIS — Z96.651 STATUS POST TOTAL PROSTHETIC REPLACEMENT OF KNEE JOINT USING CEMENT, RIGHT: ICD-10-CM

## 2024-10-25 DIAGNOSIS — Z96.651 PRESENCE OF RIGHT ARTIFICIAL KNEE JOINT: ICD-10-CM

## 2024-10-25 DIAGNOSIS — M76.51 PATELLAR TENDINITIS, RIGHT KNEE: Primary | ICD-10-CM

## 2024-10-25 PROCEDURE — 99999 PR PBB SHADOW E&M-EST. PATIENT-LVL IV: CPT | Mod: PBBFAC,,, | Performed by: FAMILY MEDICINE

## 2024-10-25 PROCEDURE — 99214 OFFICE O/P EST MOD 30 MIN: CPT | Mod: PBBFAC,PO | Performed by: FAMILY MEDICINE

## 2024-10-25 NOTE — PROGRESS NOTES
Subjective     Patient ID: Kayla Piper is a 69 y.o. female.    Chief Complaint: Follow-up (3 week L) knee TENEX f/u. DOP 10/3/24. Pt rates pain a 0 a rest but states going up and down stairs is painful. Pt also c/o limited ROM. )    Follow up from Tenex status post three weeks on patellar tendon.  Reports no pain in the patellar tendon at rest.  It is still experiencing some pain when walking down steps and still such have some limited in range of motion.  She has not had to have any medicine for pain.  Overall doing better than she was.        Review of Systems   Constitutional: Negative for chills and decreased appetite.   HENT:  Negative for congestion and sore throat.    Eyes:  Negative for blurred vision.   Cardiovascular:  Negative for chest pain, dyspnea on exertion and palpitations.   Respiratory:  Negative for cough and shortness of breath.    Skin:  Negative for rash.   Neurological:  Negative for difficulty with concentration, disturbances in coordination and headaches.   Psychiatric/Behavioral:  Negative for altered mental status, depression, hallucinations, memory loss and suicidal ideas.           Objective     General    Nursing note and vitals reviewed.  Constitutional: She is oriented to person, place, and time. She appears well-developed and well-nourished.   HENT:   Nose: Nose normal.   Eyes: EOM are normal. Pupils are equal, round, and reactive to light.   Neck: Neck supple.   Cardiovascular:  Normal rate.            Pulmonary/Chest: Effort normal.   Abdominal: Soft.   Neurological: She is alert and oriented to person, place, and time. She has normal reflexes.   Psychiatric: She has a normal mood and affect. Her behavior is normal. Judgment and thought content normal.           Right Knee Exam     Inspection   Scars: present  Effusion: absent    Tenderness   The patient is tender to palpation of the patellar tendon.    Crepitus   The patient has crepitus of the patella.    Range of  Motion   Extension:  50   Flexion:  120     Tests   Meniscus   Addie:  Medial - negative Lateral - negative  Ligament Examination   Lachman: normal (-1 to 2mm)   PCL-Posterior Drawer: normal (0 to 2mm)     MCL - Valgus: normal (0 to 2mm)  LCL - Varus: normal  Patella   Patellar apprehension: negative  Passive Patellar Tilt: neutral  Patellar Tracking: normal  J-Sign: none    Other   Popliteal (Baker's) Cyst: absent  Sensation: normal    Left Knee Exam   Left knee exam is normal.    Range of Motion   Extension:  normal   Flexion:  normal     Tests   Stability   Lachman: normal (-1 to 2mm)   PCL-Posterior Drawer: normal (0 to 2mm)  MCL - Valgus: normal (0 to 2mm)  LCL - Varus: normal (0 to 2mm)    Muscle Strength   Right Lower Extremity   Quadriceps:  5/5   Hamstrin/5     Vascular Exam     Right Pulses  Dorsalis Pedis:      2+            Physical Exam  Vitals and nursing note reviewed.   Constitutional:       Appearance: She is well-developed and well-nourished.   HENT:      Nose: Nose normal.   Eyes:      Extraocular Movements: EOM normal.      Pupils: Pupils are equal, round, and reactive to light.   Cardiovascular:      Rate and Rhythm: Normal rate.      Pulses:           Dorsalis pedis pulses are 2+ on the right side.   Pulmonary:      Effort: Pulmonary effort is normal.   Abdominal:      Palpations: Abdomen is soft.   Musculoskeletal:      Cervical back: Neck supple.      Right knee: No effusion.      Instability Tests: Medial Addie test negative and lateral Addie test negative.   Neurological:      Mental Status: She is alert and oriented to person, place, and time.      Deep Tendon Reflexes: Reflexes are normal and symmetric.   Psychiatric:         Mood and Affect: Mood and affect normal.         Behavior: Behavior normal.         Thought Content: Thought content normal.         Judgment: Judgment normal.             Assessment and Plan     Encounter Diagnoses   Name Primary?    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  Will have her follow up in four weeks for another recheck.

## 2024-11-12 ENCOUNTER — TELEPHONE (OUTPATIENT)
Dept: ORTHOPEDICS | Facility: CLINIC | Age: 69
End: 2024-11-12
Payer: MEDICARE

## 2024-11-12 NOTE — TELEPHONE ENCOUNTER
"----- Message from Janet sent at 11/12/2024  1:54 PM CST -----  Regarding: NP PHYSICAL THERAPY EFREN Rocha Morning/Afternoon,    The physical therapy department received your order to get the attached patient scheduled for an evaluation. We have reached out to the patient to schedule them for an evaluation; however, we have been unsuccessful in reaching the patient.      We wanted you to be aware that your patient has not started therapy. If you speak with them again about starting therapy please have them reach out to us at [phone] to get scheduled?.      Sincerely,  Janet "Ms. Morales" Brock  Access Navigator  [phone] FAX: [phone]  [email or web address]  "

## 2024-11-21 ENCOUNTER — CLINICAL SUPPORT (OUTPATIENT)
Dept: REHABILITATION | Facility: HOSPITAL | Age: 69
End: 2024-11-21
Payer: MEDICARE

## 2024-11-21 DIAGNOSIS — M76.51 PATELLAR TENDINITIS, RIGHT KNEE: ICD-10-CM

## 2024-11-21 DIAGNOSIS — M25.561 CHRONIC PAIN OF RIGHT KNEE: ICD-10-CM

## 2024-11-21 DIAGNOSIS — Z96.651 PRESENCE OF RIGHT ARTIFICIAL KNEE JOINT: ICD-10-CM

## 2024-11-21 DIAGNOSIS — Z74.09 IMPAIRED FUNCTIONAL MOBILITY AND ACTIVITY TOLERANCE: Primary | ICD-10-CM

## 2024-11-21 DIAGNOSIS — Z96.651 STATUS POST TOTAL PROSTHETIC REPLACEMENT OF KNEE JOINT USING CEMENT, RIGHT: ICD-10-CM

## 2024-11-21 DIAGNOSIS — G89.29 CHRONIC PAIN OF RIGHT KNEE: ICD-10-CM

## 2024-11-21 PROCEDURE — 97162 PT EVAL MOD COMPLEX 30 MIN: CPT | Mod: PN

## 2024-11-21 NOTE — PLAN OF CARE
"[facility]   Physical Therapy Initial Evaluation      Name: Kayla [facility] Number: [id number]    Therapy Diagnosis:   Encounter Diagnoses   Name Primary?    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  Underwent R TKA 9/23/2023 with fairly good results but continued to experience limited knee ROM and lateral knee pain.  While her ROM has improved, she has continued with lateral knee pain that has been fairly significant with certain activities. Underwent Tenex procedure 10/3/2024 without complication which helped relieve the burning but she has continued to experience significant pain in lateral aspect of R knee with step negotiation (about the same ascending and descending) and kneeling.  She is now referred to OP PT.     Falls: No    Imaging: MRI Knee Without Contrast Right 9/12/2024: Impression per report:   Limited exam as described.   1. Total knee arthroplasty hardware with small joint effusion.   2. Patellar tendinosis, moderate severity."    Prior Therapy: Home " "Health and OP PT following this surgery.   Social History:  lives with their spouse in 2 story home, bedroom is on 2nd floor with 14 steps to access with rail on R side when ascending.  No steps to enter the home   Occupation: dental/, full time  Prior Level of Function: Prior to TKA was walking without devices but distance limited, stairs were difficult. Following TKA she continued to experience pain in knee that affected her ability to navigate steps, ambulate over uneven surfaces and kneel. She experienced improved pain following Tenex procedure but continued with difficulty navigating steps and kneeling.  Current Level of Function: Difficulty/increased pain with step negotiation (utilizes step to pattern) and kneeling    Pain:  Current 0/10, worst 6/10, best 0/10 (up to 9-10/10 with kneeling so does not attempt)  Location: right lateral knee  Description: Intermittent sharp pain (kneeling), step negotiation: "Not a sharp pain but it's a pain. More like a stabbing pain"  Aggravating Factors: step negotiation (does not matter whether ascending or descending), kneeling  Easing Factors: Activity modification    Patients goals: To see if we can get past this pain     Medical History:   Past Medical History:   Diagnosis Date    Carotid stenosis, right     DDD (degenerative disc disease), cervical     Depression     Hyperkalemia     Hypertension     Insomnia     Rhinitis     Synovial cyst of popliteal space        Surgical History:   Kayla Piper  has a past surgical history that includes Appendectomy; Knee surgery (Right); Colonoscopy (2013); Anterior cervical discectomy w/ fusion; Carotid endarterectomy (Right, 10/03/2018); Umbilical hernia repair (12/14/2022); Tubal ligation (2000); robotic arthroplasty, knee (Right, 09/26/2023); Esophagogastroduodenoscopy (N/A, 10/23/2023); Colonoscopy (N/A, 12/19/2023); and Augmentation of breast.    Medications:   Kayla has a current medication list " which includes the following prescription(s): albuterol, allopurinol, amitriptyline, amlodipine, aspirin, cetirizine, escitalopram oxalate, fluticasone propionate, gabapentin, hydrocodone-acetaminophen, ibuprofen, linaclotide, multivitamin, omeprazole, polyethylene glycol, progesterone, rosuvastatin, temazepam, and valsartan-hydrochlorothiazide.    Allergies:   Review of patient's allergies indicates:  No Known Allergies     Objective      Posture: Standing: unremarkable. Sittin*/90* without restriction  Palpation: Significant tenderness present around R lateral tibial plateau, [place]'s tubercle, and fibular head. Tenderness also present at proximal attachment of R anterior tibialis with increased muscle tension also present along anterior tib.    Sensation: Mild deficit persists R lateral knee joint region  DTRs: Not assessed this date  Range of Motion/Strength:    Knee  Right   Left  Pain/Dysfunction with Movement    AROM PROM MMT AROM PROM MMT    Flexion  114*  120*  4+/5  128*  132*  4+/5    Extension  -4*  0*  4+/5  0*  0*  4+/5    Hip ankle ROM grossly WNL   Strength: hip flexion 4-/5, extension 4/5, abduction R 4-/5-4/5, L 4/5; adduction 4+/5; ankle 4+/5   Flexibility: Hamstring length R 160*, L 163*; piriformis WNL; Moderate ITB tightness on R, mild tightness on L; calves minimal tightness  Gait: Without AD  Analysis: Mildly antalgic favoring R LE   Bed Mobility:Independent  Transfers: Modified independent with increased UE support  Special Tests: Anterior/posterior drawer (-), varus/valgus stress (-)   Other: No swelling noted this date.      Intake Outcome Measure for FOTO Knee Survey    Therapist reviewed FOTO scores for Kayla Piper on 2024.   FOTO report - see Media section or FOTO account episode details.    Intake Score: 61%    Primary Measure     Score Range     Intake Score     Score Interpretation      KOOS, JR.            0 - 100                68.3                   Lower  Score = Greater Disability         Treatment     Total Treatment time (time-based codes) separate from Evaluation: 5 minutes     Kayla received the treatments listed below:      manual therapy techniques: kinesiology taping were applied to the: R lateral knee for 5 minutes, including:  Kinesiology taping to R fibular head/[place]'s tubercle region using star technique    Patient Education and Home Exercises     Education provided:   - Educated patient in role of PT and proposed POC. Instructed patient in care and removal of kinesiology tape    Written Home Exercises Provided:  To be issued during subsequent visits . Exercises were reviewed and Kayla was able to demonstrate them prior to the end of the session.  Kayla demonstrated good  understanding of the education provided. See EMR under Patient Instructions for exercises provided during therapy sessions.    Assessment     Kayla is a 69 y.o. 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 Patient presents with therapeutic diagnoses of  impaired functional mobility/activity tolerance, chronic pain of R knee.  Additional problems include: increased tenderness, impaired LE flexibility, mild ROM deficit, deficit of hip strength.  These problems contribute to the following limitations:  mildly impaired gait, difficulty with step negotiation, impaired kneeling.  Kayla will benefit from skilled PT services to address these problems in order to reduce tenderness/soft tissue tension, to reduce knee pain, to minimize functional deficits and to maximize activity tolerance.     Patient prognosis is Fair.   Patient will benefit from skilled outpatient Physical Therapy to address the deficits stated above and in the chart below, provide patient /family education, and to maximize patientt's level of independence.     Plan of care discussed  "with patient: Yes  Patient's spiritual, cultural and educational needs considered and patient is agreeable to the plan of care and goals as stated below:     Anticipated Barriers for therapy: Chronic problem    Medical Necessity is demonstrated by the following  History  Co-morbidities and personal factors that may impact the plan of care [] LOW: no personal factors / co-morbidities  [x] MODERATE: 1-2 personal factors / co-morbidities  [] HIGH: 3+ personal factors / co-morbidities     Moderate / High Support Documentation:   Co-morbidities affecting plan of care: Asthma, HTN.      Personal Factors:   no deficits      Examination  Body Structures and Functions, activity limitations and participation restrictions that may impact the plan of care [] LOW: addressing 1-2 elements  [x] MODERATE: 3+ elements  [] HIGH: 4+ elements (please support below)     Moderate / High Support Documentation: Limited flexibility, difficulty with step negotiation, difficulty with kneeling,       Clinical Presentation [] LOW: stable  [x] MODERATE: Evolving  [] HIGH: Unstable      Decision Making/ Complexity Score: moderate     Goals:  Short Term Goals: 3 weeks   1) Decrease maximal pain to < 4/10  2) Decrease tenderness 50%  3) Patient will ascend/descend 4" steps using reciprocating step pattern with no greater than minimal increase in pain    Long Term Goals: 6 weeks   1) Facilitate improved LE flexibility  2) Patient will consistently navigate 6-8" steps utilizing reciprocating gait pattern without increased knee pain  3) Patient will kneel and return to stand without increased knee pain  4) Improve functional score to > 75% as per FOTO knee survey  5) Patient will be independent in HEP.     Plan     Plan of care Certification: 11/21/2024 to 1/3/2025.    Outpatient Physical Therapy 2 times weekly for 6 weeks to include the following interventions: Electrical Stimulation IFC, Gait Training, Manual Therapy, Moist Heat/ Ice, Neuromuscular " Re-ed, Patient Education, Therapeutic Activities, Therapeutic Exercise, and Functional Dry Needling, Therapeutic Taping . Kayla may be treated by PTA as part of their rehab team.     Karey Mccrary, PT        Physician's Signature: _________________________________________ Date: ________________

## 2024-11-22 DIAGNOSIS — G47.00 INSOMNIA, UNSPECIFIED TYPE: ICD-10-CM

## 2024-11-22 RX ORDER — AMITRIPTYLINE HYDROCHLORIDE 25 MG/1
25 TABLET, FILM COATED ORAL NIGHTLY PRN
Qty: 90 TABLET | Refills: 0 | Status: SHIPPED | OUTPATIENT
Start: 2024-11-22

## 2024-11-26 ENCOUNTER — CLINICAL SUPPORT (OUTPATIENT)
Dept: REHABILITATION | Facility: HOSPITAL | Age: 69
End: 2024-11-26
Attending: FAMILY MEDICINE
Payer: MEDICARE

## 2024-11-26 ENCOUNTER — OFFICE VISIT (OUTPATIENT)
Dept: FAMILY MEDICINE | Facility: CLINIC | Age: 69
End: 2024-11-26
Payer: MEDICARE

## 2024-11-26 ENCOUNTER — LAB VISIT (OUTPATIENT)
Dept: LAB | Facility: HOSPITAL | Age: 69
End: 2024-11-26
Attending: NURSE PRACTITIONER
Payer: MEDICARE

## 2024-11-26 VITALS
OXYGEN SATURATION: 92 % | DIASTOLIC BLOOD PRESSURE: 84 MMHG | BODY MASS INDEX: 26.57 KG/M2 | HEIGHT: 64 IN | RESPIRATION RATE: 16 BRPM | HEART RATE: 82 BPM | WEIGHT: 155.63 LBS | SYSTOLIC BLOOD PRESSURE: 120 MMHG

## 2024-11-26 DIAGNOSIS — R20.9 SENSATION DISTURBANCE OF SKIN: Primary | ICD-10-CM

## 2024-11-26 DIAGNOSIS — M79.18 MYALGIA, OTHER SITE: ICD-10-CM

## 2024-11-26 DIAGNOSIS — R20.9 SENSATION DISTURBANCE OF SKIN: ICD-10-CM

## 2024-11-26 DIAGNOSIS — Z82.69 FAMILY HISTORY OF SYSTEMIC LUPUS ERYTHEMATOSUS (SLE) IN MOTHER: ICD-10-CM

## 2024-11-26 DIAGNOSIS — M13.0 POLYARTHRITIS, UNSPECIFIED: ICD-10-CM

## 2024-11-26 DIAGNOSIS — G89.29 CHRONIC PAIN OF RIGHT KNEE: ICD-10-CM

## 2024-11-26 DIAGNOSIS — M25.561 CHRONIC PAIN OF RIGHT KNEE: ICD-10-CM

## 2024-11-26 DIAGNOSIS — Z74.09 IMPAIRED FUNCTIONAL MOBILITY AND ACTIVITY TOLERANCE: Primary | ICD-10-CM

## 2024-11-26 LAB
25(OH)D3+25(OH)D2 SERPL-MCNC: 70 NG/ML (ref 30–96)
CRP SERPL-MCNC: 0.8 MG/L (ref 0–8.2)
ERYTHROCYTE [SEDIMENTATION RATE] IN BLOOD BY WESTERGREN METHOD: 8 MM/HR (ref 0–20)
IRON SERPL-MCNC: 110 UG/DL (ref 30–160)
MAGNESIUM SERPL-MCNC: 1.9 MG/DL (ref 1.6–2.6)
RHEUMATOID FACT SERPL-ACNC: <13 IU/ML (ref 0–15)
SATURATED IRON: 37 % (ref 20–50)
TOTAL IRON BINDING CAPACITY: 300 UG/DL (ref 250–450)
TRANSFERRIN SERPL-MCNC: 203 MG/DL (ref 200–375)
VIT B12 SERPL-MCNC: 1754 PG/ML (ref 210–950)

## 2024-11-26 PROCEDURE — 82607 VITAMIN B-12: CPT | Performed by: NURSE PRACTITIONER

## 2024-11-26 PROCEDURE — 85651 RBC SED RATE NONAUTOMATED: CPT | Performed by: NURSE PRACTITIONER

## 2024-11-26 PROCEDURE — 86038 ANTINUCLEAR ANTIBODIES: CPT | Performed by: NURSE PRACTITIONER

## 2024-11-26 PROCEDURE — 86431 RHEUMATOID FACTOR QUANT: CPT | Performed by: NURSE PRACTITIONER

## 2024-11-26 PROCEDURE — 99999 PR PBB SHADOW E&M-EST. PATIENT-LVL III: CPT | Mod: PBBFAC,,, | Performed by: NURSE PRACTITIONER

## 2024-11-26 PROCEDURE — 83735 ASSAY OF MAGNESIUM: CPT | Performed by: NURSE PRACTITIONER

## 2024-11-26 PROCEDURE — 83540 ASSAY OF IRON: CPT | Performed by: NURSE PRACTITIONER

## 2024-11-26 PROCEDURE — 97110 THERAPEUTIC EXERCISES: CPT | Mod: PN

## 2024-11-26 PROCEDURE — 82306 VITAMIN D 25 HYDROXY: CPT | Performed by: NURSE PRACTITIONER

## 2024-11-26 PROCEDURE — 97014 ELECTRIC STIMULATION THERAPY: CPT | Mod: PN

## 2024-11-26 PROCEDURE — 97140 MANUAL THERAPY 1/> REGIONS: CPT | Mod: PN

## 2024-11-26 PROCEDURE — 36415 COLL VENOUS BLD VENIPUNCTURE: CPT | Performed by: NURSE PRACTITIONER

## 2024-11-26 PROCEDURE — 86140 C-REACTIVE PROTEIN: CPT | Performed by: NURSE PRACTITIONER

## 2024-11-26 PROCEDURE — 99213 OFFICE O/P EST LOW 20 MIN: CPT | Mod: PBBFAC,PN | Performed by: NURSE PRACTITIONER

## 2024-11-26 PROCEDURE — 99214 OFFICE O/P EST MOD 30 MIN: CPT | Mod: S$PBB,,, | Performed by: NURSE PRACTITIONER

## 2024-11-26 NOTE — PROGRESS NOTES
Subjective:       Patient ID: Kayla Piper is a 69 y.o. female.    Chief Complaint: Generalized Body Aches (Sensitivity around shoulders down to biceps, runs down the back to the knees, ongoing for the last two weeks approximately.) and Insomnia    Kayla Piper presents to the clinic today for skin sensation changes/ arthralgia/myalgia     She has been under the care of the following  Vascular: - f/u yearly for history of Right Carotid Bruit  Gastroenterology: Dr. Herron- colonoscopy 2013- reports that it was normal.   Orthopedics: Dr. Kearney  Gynecology: Dr. Tracy Keene- [facility]     Patient Active Problem List  Diagnosis  ·           Rhinitis  ·           Cervical disc displacement  ·           Cervical radiculopathy  ·           Chronic hoarseness  ·           ETD (eustachian tube dysfunction)  ·           Laryngopharyngeal reflux (LPR)  ·           Depression  ·           Hypertension  ·           Insomnia  ·           Gastroesophageal reflux disease without esophagitis  ·           Carotid stenosis, right       Symptoms began initially around 2 weeks ago  Reports noticed when she was getting dressed for dinner the backs of her arms felt very sensitive  Sensitivity has since progressed to include upper arms, back/shoulder area, axillary/sides, and posterior thighs  No visible rash  No recent medication changes  No recent travels  Developed muscle soreness/joint soreness  Denies any recent fever,shakes,chills, viral s/sx.  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     Appearance: Normal appearance.   Cardiovascular:      Rate and Rhythm: Normal rate.   Pulmonary:      Effort: Pulmonary effort is normal. No respiratory distress.   Musculoskeletal:      Right lower leg: No edema.      Left lower leg: No edema.   Skin:     General: Skin is warm and dry.      Findings: No rash.   Neurological:      General: No focal deficit present.      Mental Status: She is alert and oriented to person, place, and time.   Psychiatric:         Mood and Affect: Mood normal.         Behavior: Behavior normal.         Lab Results   Component Value Date    WBC 8.66 08/15/2024    HGB 13.4 08/15/2024    HCT 42.0 08/15/2024     08/15/2024    CHOL 126 08/15/2024    TRIG 75 08/15/2024    HDL 47 08/15/2024    ALT 13 08/15/2024    AST 16 08/15/2024     08/15/2024    K 4.2 08/15/2024     08/15/2024    CREATININE 1.0 08/15/2024    BUN 22 08/15/2024    CO2 29 08/15/2024    TSH 1.539 08/15/2024    HGBA1C 4.9 08/15/2024     The ASCVD Risk score (Omar DK, et al., 2019) failed to calculate for the following reasons:    The valid total cholesterol range is 130 to 320 mg/dL  Visit Vitals  /84 (BP Location: Left arm, Patient Position: Sitting)   Pulse 82   Resp 16   Ht 5' 4" (1.626 m)   Wt 70.6 kg (155 lb 9.6 oz)   SpO2 (!) 92%   BMI 26.71 kg/m²      Assessment:       1. Sensation disturbance of skin    2. Family history of systemic lupus erythematosus (SLE) in mother    3. Polyarthritis, unspecified    4. Myalgia, other site        Plan:       1. Sensation disturbance of skin  -     Magnesium; " Future; Expected date: 11/26/2024  -     Vitamin B12; Future; Expected date: 11/26/2024  -     Iron and TIBC; Future; Expected date: 11/26/2024    2. Family history of systemic lupus erythematosus (SLE) in mother  -     RASHAWN Screen w/Reflex; Future; Expected date: 11/26/2024  -     Rheumatoid Factor; Future; Expected date: 11/26/2024    3. Polyarthritis, unspecified  -     Iron and TIBC; Future; Expected date: 11/26/2024  -     Sedimentation rate; Future; Expected date: 11/26/2024  -     C-Reactive Protein; Future; Expected date: 11/26/2024  -     Vitamin D; Future; Expected date: 11/26/2024    4. Myalgia, other site  -     Vitamin D; Future; Expected date: 11/26/2024     Obtain blood work for review.  Anti-inflammatory as directed with food    No follow-ups on file.      Future Appointments       Date Provider Specialty Appt Notes    11/26/2024  Lab labs    12/3/2024 Karey Mccrary, PT Outpatient Rehab RT Artificial Knee Joint  2:1  POC: 2W6 Expires:01/03/2025  Medicare    12/3/2024 Blas Salas MD Orthopedics Knee    12/10/2024 Karey Mccrary, [facility] Outpatient Rehab RT Artificial Knee Joint  2:1  POC: 2W6 Expires:01/03/2025  Medicare    12/17/2024 Federica Carolina PTA Outpatient Rehab RT Artificial Knee Joint  2:1  POC: 2W6 Expires:01/03/2025  Medicare    12/17/2024 Janet Aggarwal, [facility] 3 month med refill    12/19/2024 Federica Carolina PTA Outpatient Rehab RT Artificial Knee Joint  2:1  POC: 2W6 Expires:01/03/2025  Medicare

## 2024-11-26 NOTE — PROGRESS NOTES
"[facility]   Physical Therapy Treatment Note     Name: Kayla Justin [facility] Number: [id number]    Therapy Diagnosis:   Encounter Diagnoses   Name Primary?    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  She was not compliant with home exercise program as one has not been issued yet.  Response to previous treatment: Felt okay  Functional change: None yet noted    Pain: 0/10  Location: right lateral thigh (distal)     OBJECTIVE     Objective Measures updated at progress report unless specified.     Treatment     Kayla received the treatments listed below: (new activities/progressions indicated in bold print, continued activities indicated in regular print, activities not performed indicated by light print, and to be added/changed in blue print)    therapeutic exercises to develop strength and flexibility for 13 minutes including:  Standing gastroc stretch using wedge 3x30s    Soleus stretch using wedge 3x30s   ITB stretch 3x30s ea       Hip flexion (march) 2x10 (3s hold)   Hip abduction 2x20 (3s hold)    To be added:   Hip dips, hip flexion SLR, quad stretch    therapeutic activities to improve " "functional performance for 10 minutes, including:  Treadmill @ 4% incline, 1.8 mph x 6' forward, retro @ 1.0 mph x 3'  Closed chain TKE using 4" step 2x10 R only    manual therapy techniques: functional dry needling were applied to the: R lateral thigh for 15 minutes, including:  Informed consent obtained after thorough explanation of risks and benefits. Signed consent form will be scanned into medical records. Pre-procedure time out performed which included verification of name, , and site of treatment. Cleaned target tissues with 70% isopropyl alcohol wipe down and performed with single use sterile prep pads.   1) 60 mm needle inserted in the triangular depression between the TFL and sartorius muscles (3 fingerbreadths inferior to R ASIS) angled anterior to posterior into musculotendinous junction of rectus femoris  2) 60 mm needle inserted into anterolateral thigh 8 fingerbreadths superior to superolateral border of patella angled posteromedially into anteroinferior aspect of ITB targeting trigger point in lateral attachment of lateral quad   3) 60 mm needle inserted into anterolateral thigh 4 fingerbreadths superior to superolateral border of patella angled posteromedially into anteroinferior aspect of ITB  targeting trigger point in lateral attachment of lateral quad   4) 60 mm needle inserted into anterolateral thigh 3 fingerbreadths superior to superolateral border of patella angled posteromedially into vastus lateralis muscle   5) 60 mm needle inserted 1/2 the distance between ASIS and greater trochanter angled perpendicular (toward articulation) into mid-belly of TFL   6) 60 mm needle inserted 1/3 the distance from apex of greater trochanter to S3 midline angled anteromedially towards symphysis pubis passing through glut max into piriformis    7) 60 mm needle inserted 9 fingerbreadths superior to popliteal crease on the lateral thigh angled medially into lateral aspect of ITB addressing trigger point in " lateral hamstrings (muscle attachment)   8) 60 mm needle inserted 3 fingerbreadths superior point #7 on the lateral thigh angled medially into lateral aspect of ITB addressing trigger point in lateral hamstrings (muscle attachment)     9) 30 mm needle inserted at distal ITB attachment over [place]'s tubercle (pinching skin superior <> inferior) angling needle posteromedially     Following insertion needles were wound uni-directionally.  Treatment performed in L side lying. Electrical stimulation with Functional Dry Needling x 15 min at 80 Hz wave frequency, 150 microseconds (using 4 channels). Intensity set to patient tolerance. Patient provided with call bell. Needles left in situ for 15 minutes after set up in conjunction with ES. Patient instructed in increasing hydration habits following for decreased soreness. No adverse effects present following needling. Patient reported soreness following session     Patient Education and Home Exercises     Home Exercises Provided and Patient Education Provided     Education provided:   - Educated patient in initial exercise program, risks and benefits of dry needling    Written Home Exercises Provided:  To be issued during subsequent visits . Exercises were reviewed and Kayla was able to demonstrate them prior to the end of the session.  Kayla demonstrated good  understanding of the education provided. See EMR under Patient Instructions for exercises provided during therapy sessions    ASSESSMENT     Patient presents to PT today reporting no pain initially but developed R lateral knee/distal thigh discomfort during activities.  Initiated dry needling today with good tolerance.  She reported increased soreness in R lateral knee region following session.     Kayla Is not yet progressing well towards her goals.   Pt prognosis is Fair.     Pt will continue to benefit from skilled outpatient physical therapy to address the deficits listed in the problem list box on initial  "evaluation, provide pt/family education and to maximize pt's level of independence in the home and community environment.     Pt's spiritual, cultural and educational needs considered and pt agreeable to plan of care and goals.     Anticipated barriers to physical therapy: Chronic    Goals:   Short Term Goals: 3 weeks   1) Decrease maximal pain to < 4/10 Initiated  2) Decrease tenderness 50% Initiated  3) Patient will ascend/descend 4" steps using reciprocating step pattern with no greater than minimal increase in pain Initiated     Long Term Goals: 6 weeks   1) Facilitate improved LE flexibility Initiated  2) Patient will consistently navigate 6-8" steps utilizing reciprocating gait pattern without increased knee pain Ongoing  3) Patient will kneel and return to stand without increased knee pain Ongoing  4) Improve functional score to > 75% as per FOTO knee survey Ongoing  5) Patient will be independent in HEP.  Initiated    PLAN     POC: Outpatient Physical Therapy 2 times weekly for 6 weeks to include the following interventions: Electrical Stimulation IFC, Gait Training, Manual Therapy, Moist Heat/ Ice, Neuromuscular Re-ed, Patient Education, Therapeutic Activities, Therapeutic Exercise, and Functional Dry Needling, Therapeutic Taping . Kayla may be treated by PTA as part of their rehab team.     The patient is to be progressed within the established plan of care as tolerated in order to accomplish goals as stated above. Continue dry needling to address lateral knee pain. Progress stretching/strengthening exercises as tolerated. Increase step height, add Hip dips, hip flexion SLR, quad stretch    Karey Mccrary, PT      "

## 2024-11-27 LAB — ANA SER QL IF: NORMAL

## 2024-11-30 ENCOUNTER — PATIENT MESSAGE (OUTPATIENT)
Dept: FAMILY MEDICINE | Facility: CLINIC | Age: 69
End: 2024-11-30
Payer: MEDICARE

## 2024-12-02 ENCOUNTER — DOCUMENTATION ONLY (OUTPATIENT)
Dept: REHABILITATION | Facility: HOSPITAL | Age: 69
End: 2024-12-02
Payer: MEDICARE

## 2024-12-02 DIAGNOSIS — R26.9 GAIT DISTURBANCE: Primary | ICD-10-CM

## 2024-12-02 DIAGNOSIS — M25.661 DECREASED ROM OF RIGHT KNEE: ICD-10-CM

## 2024-12-02 NOTE — PROGRESS NOTES
[facility]  PT Discharge Note    Name: Kayla Justin [facility] Number: [id number]    Therapy Diagnosis:   Encounter Diagnoses   Name Primary?    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  See daily treatment notes for progression/status.     Discharge reason: Patient has not attended therapy since 11/10/2023    Discharge FOTO Score: Completd 10/27/2023 with a Functional score 46%     Goals: as documented in final treatment note  Short Term Goals: 3 weeks   1) Decrease swelling by 1 cm at each affected level Progressing  2) Improve AROM R knee to 5* - 115* to assist with transfers Met 11/7/2023  3) Patient will demonstrate equal weight bearing during sit <> stand transfers 8 of 10 trials Progressing  4) Patient will perform car transfer without UE assist for LE management 7 of 10 trials Progressing/nearly met     Long Term Goals: 6 weeks   1) Patient will consistently sleep through the night without interruption by knee pain Minimal progress  2) Patient will consistently ambulate over level surfaces demonstrating appropriate foot clearance with adequate knee flexion, consistent R heel strike/toe off, equal weight bearing/stance time Progressing  3) Patient will navigate a flight of steps utilizing reciprocating gait pattern and minimal UE support Progressing  4) Patient will resume her regular household chores Progressing  5) Patient will resume light work activities. Met 11/7/2023  6) Improve functional score to > 70% as indicated  by FOTO knee survey Progressing  7) Patient will be independent in HEP. Progressing    PLAN   This patient is discharged from Physical Therapy      Karey Mccrary, PT

## 2024-12-17 ENCOUNTER — TELEPHONE (OUTPATIENT)
Dept: FAMILY MEDICINE | Facility: CLINIC | Age: 69
End: 2024-12-17
Payer: MEDICARE

## 2024-12-17 NOTE — TELEPHONE ENCOUNTER
----- Message from Tammie sent at 12/17/2024  2:47 PM CST -----  Contact: Self  Type:  Sooner Appointment Request    Caller is requesting a sooner appointment.  Caller declined first available appointment listed below.  Caller will not accept being placed on the waitlist and is requesting a message be sent to doctor.    Name of Caller:  Patient  When is the first available appointment?  02/03  Symptoms:  Needs f/u for med refills  Would the patient rather a call back or a response via [facility]? call  Best Call Back Number:  [phone]    Additional Information:  Pt had to cancel sinxce she is heading back to the ER with her , she is trying to see if there was anyway we can get her worked in this week or next for this. Stated this will be the 3rd time she has had him in the hospital. Thank You

## 2024-12-24 ENCOUNTER — OFFICE VISIT (OUTPATIENT)
Dept: FAMILY MEDICINE | Facility: CLINIC | Age: 69
End: 2024-12-24
Payer: MEDICARE

## 2024-12-24 VITALS
DIASTOLIC BLOOD PRESSURE: 77 MMHG | SYSTOLIC BLOOD PRESSURE: 130 MMHG | OXYGEN SATURATION: 98 % | HEART RATE: 77 BPM | BODY MASS INDEX: 26.98 KG/M2 | WEIGHT: 158 LBS | HEIGHT: 64 IN | RESPIRATION RATE: 16 BRPM

## 2024-12-24 DIAGNOSIS — R82.90 ABNORMAL URINE ODOR: ICD-10-CM

## 2024-12-24 DIAGNOSIS — N30.01 ACUTE CYSTITIS WITH HEMATURIA: ICD-10-CM

## 2024-12-24 DIAGNOSIS — R79.89 ELEVATED VITAMIN B12 LEVEL: Primary | ICD-10-CM

## 2024-12-24 DIAGNOSIS — R20.9 UNSPECIFIED DISTURBANCES OF SKIN SENSATION: ICD-10-CM

## 2024-12-24 DIAGNOSIS — G47.00 INSOMNIA, UNSPECIFIED TYPE: ICD-10-CM

## 2024-12-24 LAB
ALBUMIN/CREAT UR: 20 UG/MG (ref 0–30)
BILIRUBIN, UA POC OHS: NEGATIVE
BLOOD, UA POC OHS: ABNORMAL
CLARITY, UA POC OHS: ABNORMAL
COLOR, UA POC OHS: YELLOW
CREAT UR-MCNC: 105 MG/DL (ref 15–325)
GLUCOSE, UA POC OHS: NEGATIVE
KETONES, UA POC OHS: NEGATIVE
LEUKOCYTES, UA POC OHS: ABNORMAL
MICROALBUMIN UR DL<=1MG/L-MCNC: 21 UG/ML
NITRITE, UA POC OHS: POSITIVE
PH, UA POC OHS: 6
PROTEIN, UA POC OHS: ABNORMAL
SPECIFIC GRAVITY, UA POC OHS: >=1.03
UROBILINOGEN, UA POC OHS: 0.2

## 2024-12-24 PROCEDURE — 99214 OFFICE O/P EST MOD 30 MIN: CPT | Mod: S$PBB,,, | Performed by: NURSE PRACTITIONER

## 2024-12-24 PROCEDURE — 87186 SC STD MICRODIL/AGAR DIL: CPT | Performed by: NURSE PRACTITIONER

## 2024-12-24 PROCEDURE — 87088 URINE BACTERIA CULTURE: CPT | Performed by: NURSE PRACTITIONER

## 2024-12-24 PROCEDURE — 81003 URINALYSIS AUTO W/O SCOPE: CPT | Mod: PBBFAC,PN | Performed by: NURSE PRACTITIONER

## 2024-12-24 PROCEDURE — 99999 PR PBB SHADOW E&M-EST. PATIENT-LVL IV: CPT | Mod: PBBFAC,,, | Performed by: NURSE PRACTITIONER

## 2024-12-24 PROCEDURE — 87086 URINE CULTURE/COLONY COUNT: CPT | Performed by: NURSE PRACTITIONER

## 2024-12-24 PROCEDURE — 82043 UR ALBUMIN QUANTITATIVE: CPT | Performed by: NURSE PRACTITIONER

## 2024-12-24 PROCEDURE — 99214 OFFICE O/P EST MOD 30 MIN: CPT | Mod: PBBFAC,PN | Performed by: NURSE PRACTITIONER

## 2024-12-24 PROCEDURE — 99999PBSHW POCT URINALYSIS(INSTRUMENT): Mod: PBBFAC,,,

## 2024-12-24 RX ORDER — AMITRIPTYLINE HYDROCHLORIDE 10 MG/1
10 TABLET, FILM COATED ORAL NIGHTLY PRN
Qty: 30 TABLET | Refills: 2 | Status: SHIPPED | OUTPATIENT
Start: 2024-12-24

## 2024-12-24 RX ORDER — NITROFURANTOIN 25; 75 MG/1; MG/1
100 CAPSULE ORAL 2 TIMES DAILY
Qty: 10 CAPSULE | Refills: 0 | Status: SHIPPED | OUTPATIENT
Start: 2024-12-24

## 2024-12-24 NOTE — PROGRESS NOTES
Subjective:       Patient ID: Kayla Piper is a 69 y.o. female.    Chief Complaint: Med Change Follow Up    Kayla Piper presents to the clinic today for follow up      She has been under the care of the following  Vascular: - f/u yearly for history of Right Carotid Bruit  Gastroenterology: Dr. Herron- colonoscopy 2013- reports that it was normal.   Orthopedics: Dr. Kearney  Gynecology: Dr. Tracy Keene- [facility]      Patient Active Problem List  Diagnosis  ·           Rhinitis  ·           Cervical disc displacement  ·           Cervical radiculopathy  ·           Chronic hoarseness  ·           ETD (eustachian tube dysfunction)  ·           Laryngopharyngeal reflux (LPR)  ·           Depression  ·           Hypertension  ·           Insomnia  ·           Gastroesophageal reflux disease without esophagitis  ·           Carotid stenosis, right     Was seen in the clinic 11/26/2024 for skin sensation changes/generalized joint-muscular pain   Symptoms began initially around 2 weeks ago  Reports noticed when she was getting dressed for dinner the backs of her arms felt very sensitive  Sensitivity has since progressed to include upper arms, back/shoulder area, axillary/sides, and posterior thighs  No visible rash  No recent medication changes  No recent travels  Developed muscle soreness/joint soreness  Denies any recent fever,shakes,chills, viral s/sx.  No recent vaccines   Reports family hx of SLE (mother)- was diagnosed in 60's, sister: fibromyalgia     Reports that she does not take a B complex or Vitamin B supplement.    Continues to experience skin sensation changes to the upper arms and sides of torso. 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     Appearance: Normal appearance.   Cardiovascular:      Rate and Rhythm: Normal rate.   Pulmonary:      Effort: Pulmonary effort is normal. No respiratory distress.   Musculoskeletal:      Right lower leg: No edema.      Left lower leg: No edema.   Skin:     General: Skin is warm and dry.      Findings: No rash.   Neurological:      General: No focal deficit present.      Mental Status: She is alert and oriented to person, place, and time.   Psychiatric:         Mood and Affect: Mood normal.         Behavior: Behavior normal.         Lab Results   Component Value Date    WBC 8.66 08/15/2024    HGB 13.4 08/15/2024    HCT 42.0 08/15/2024     08/15/2024    CHOL 126 08/15/2024    TRIG 75 08/15/2024    HDL 47 08/15/2024    ALT 13 08/15/2024    AST 16 08/15/2024     08/15/2024    K 4.2 08/15/2024     08/15/2024    CREATININE 1.0 08/15/2024    BUN 22 08/15/2024    CO2 29 08/15/2024    TSH 1.539 08/15/2024    HGBA1C 4.9 08/15/2024     The ASCVD Risk score (Omar DK, et al., 2019) failed to calculate for the following reasons:    The valid total cholesterol range is 130 to 320 mg/dL  Visit Vitals  /77 (BP Location: Right arm, Patient Position: Sitting)   Pulse 77   Resp 16   Ht 5' 4" (1.626 m)   Wt 71.7 kg (158 lb)   SpO2 98%   BMI 27.12 kg/m²      Assessment:       1. Elevated vitamin B12 level    2. Abnormal urine odor    3. Unspecified disturbances of skin " sensation    4. Insomnia, unspecified type    5. Acute cystitis with hematuria        Plan:       1. Elevated vitamin B12 level  -     Vitamin B12; Future; Expected date: 12/24/2024  -     Microalbumin/Creatinine Ratio, Urine; Future; Expected date: 12/24/2024  Obtain blood work/urine for review.     2. Abnormal urine odor  -     POCT Urinalysis(Instrument)  -     Urine culture    3. Unspecified disturbances of skin sensation  -     Vitamin B12; Future; Expected date: 12/24/2024    4. Insomnia, unspecified type  -     amitriptyline (ELAVIL) 10 MG tablet; Take 1 tablet (10 mg total) by mouth nightly as needed for Insomnia.  Dispense: 30 tablet; Refill: 2  Continue current regimen, + 10 mg tablet nightly to assist with paresthesia- worsening of s/sx discontinue and notify office for guidance of titration/discontinuation of 25 mg tablet.   5. Acute cystitis with hematuria  -     nitrofurantoin, macrocrystal-monohydrate, (MACROBID) 100 MG capsule; Take 1 capsule (100 mg total) by mouth 2 (two) times daily.  Dispense: 10 capsule; Refill: 0  Take medication as prescribed with food, increase fluid intake   Will send for culture      No follow-ups on file.      Future Appointments       Date Provider Specialty Appt Notes    12/30/2024  Lab labs    1/28/2025 Janet Aggarwal NP Family Medicine 1m f/u

## 2024-12-26 LAB — BACTERIA UR CULT: ABNORMAL

## 2024-12-30 ENCOUNTER — LAB VISIT (OUTPATIENT)
Dept: LAB | Facility: HOSPITAL | Age: 69
End: 2024-12-30
Attending: NURSE PRACTITIONER
Payer: MEDICARE

## 2024-12-30 DIAGNOSIS — R79.89 ELEVATED VITAMIN B12 LEVEL: ICD-10-CM

## 2024-12-30 DIAGNOSIS — R20.9 UNSPECIFIED DISTURBANCES OF SKIN SENSATION: ICD-10-CM

## 2024-12-30 PROCEDURE — 82607 VITAMIN B-12: CPT | Performed by: NURSE PRACTITIONER

## 2024-12-30 PROCEDURE — 36415 COLL VENOUS BLD VENIPUNCTURE: CPT | Performed by: NURSE PRACTITIONER

## 2024-12-31 LAB — VIT B12 SERPL-MCNC: 489 PG/ML (ref 210–950)

## 2025-01-01 ENCOUNTER — PATIENT MESSAGE (OUTPATIENT)
Dept: FAMILY MEDICINE | Facility: CLINIC | Age: 70
End: 2025-01-01
Payer: MEDICARE

## 2025-01-15 DIAGNOSIS — M10.9 GOUT INVOLVING TOE OF RIGHT FOOT, UNSPECIFIED CAUSE, UNSPECIFIED CHRONICITY: ICD-10-CM

## 2025-01-15 DIAGNOSIS — I65.23 CAROTID STENOSIS, ASYMPTOMATIC, BILATERAL: ICD-10-CM

## 2025-01-15 DIAGNOSIS — F32.A DEPRESSION, UNSPECIFIED DEPRESSION TYPE: ICD-10-CM

## 2025-01-15 RX ORDER — ALLOPURINOL 300 MG/1
300 TABLET ORAL
Qty: 90 TABLET | Refills: 3 | Status: SHIPPED | OUTPATIENT
Start: 2025-01-15

## 2025-01-15 RX ORDER — ESCITALOPRAM OXALATE 20 MG/1
20 TABLET ORAL
Qty: 90 TABLET | Refills: 3 | Status: SHIPPED | OUTPATIENT
Start: 2025-01-15

## 2025-01-15 RX ORDER — ROSUVASTATIN CALCIUM 10 MG/1
10 TABLET, COATED ORAL
Qty: 90 TABLET | Refills: 3 | Status: SHIPPED | OUTPATIENT
Start: 2025-01-15

## 2025-01-15 NOTE — TELEPHONE ENCOUNTER
Refill Routing Note   Medication(s) are not appropriate for processing by [facility] for the following reason(s):        Non-participating provider    [facility] action(s):  Route               Appointments  past 12m or future 3m with PCP    Date Provider   Last Visit   12/24/2024 Janet Aggarwal NP   Next Visit   1/28/2025 Janet Aggarwal NP   ED visits in past 90 days: 0        Note composed:8:42 AM 01/15/2025

## 2025-01-28 ENCOUNTER — OFFICE VISIT (OUTPATIENT)
Dept: FAMILY MEDICINE | Facility: CLINIC | Age: 70
End: 2025-01-28
Payer: MEDICARE

## 2025-01-28 VITALS
SYSTOLIC BLOOD PRESSURE: 118 MMHG | BODY MASS INDEX: 27.57 KG/M2 | OXYGEN SATURATION: 96 % | HEART RATE: 79 BPM | RESPIRATION RATE: 14 BRPM | WEIGHT: 161.5 LBS | DIASTOLIC BLOOD PRESSURE: 70 MMHG | HEIGHT: 64 IN

## 2025-01-28 DIAGNOSIS — R20.9 UNSPECIFIED DISTURBANCES OF SKIN SENSATION: ICD-10-CM

## 2025-01-28 DIAGNOSIS — Z12.31 ENCOUNTER FOR SCREENING MAMMOGRAM FOR MALIGNANT NEOPLASM OF BREAST: Primary | ICD-10-CM

## 2025-01-28 PROCEDURE — 99214 OFFICE O/P EST MOD 30 MIN: CPT | Mod: PBBFAC,PN | Performed by: NURSE PRACTITIONER

## 2025-01-28 PROCEDURE — 99999 PR PBB SHADOW E&M-EST. PATIENT-LVL IV: CPT | Mod: PBBFAC,,, | Performed by: NURSE PRACTITIONER

## 2025-01-28 PROCEDURE — 99214 OFFICE O/P EST MOD 30 MIN: CPT | Mod: S$PBB,,, | Performed by: NURSE PRACTITIONER

## 2025-01-28 NOTE — PROGRESS NOTES
Subjective:       Patient ID: Kayla Piper is a 69 y.o. female.    Chief Complaint: Follow-up    Kayla Piper presents to the clinic today for follow up      She has been under the care of the following  Vascular: - f/u yearly for history of Right Carotid Bruit  Gastroenterology: Dr. Herron- colonoscopy 2013- reports that it was normal.   Orthopedics: Dr. Kearney  Gynecology: Dr. Tracy Keene- [facility]      Patient Active Problem List  Diagnosis  ·           Rhinitis  ·           Cervical disc displacement  ·           Cervical radiculopathy  ·           Chronic hoarseness  ·           ETD (eustachian tube dysfunction)  ·           Laryngopharyngeal reflux (LPR)  ·           Depression  ·           Hypertension  ·           Insomnia  ·           Gastroesophageal reflux disease without esophagitis  ·           Carotid stenosis, right     Was seen in the clinic 11/26/2024 for skin sensation changes/generalized joint-muscular pain   Symptoms began initially around 2 weeks ago  Reports noticed when she was getting dressed for dinner the backs of her arms felt very sensitive  Sensitivity has since progressed to include upper arms, back/shoulder area, axillary/sides, and posterior thighs  No visible rash  No recent medication changes  No recent travels  Developed muscle soreness/joint soreness  Denies any recent fever,shakes,chills, viral s/sx.  No recent vaccines   Reports family hx of SLE (mother)- was diagnosed in 60's, sister: fibromyalgia      Reports that she does not take a B complex or Vitamin B supplement.     Continued to experience skin sensation changes to the upper arms and sides of torso. Still no visible rash    Instructed to continue current regimen of amitriptyline 25 mg tablet with addition of 10 mg at bedtime to assist with paresthesia- worsening of s/sx discontinue and notify office for guidance of titration/discontinuation of 25 mg tablet.     Reports since her last  visit she has had an improvement of her symptoms, but no resolution.              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     Appearance: Normal appearance.   Eyes:      Conjunctiva/sclera: Conjunctivae normal.   Cardiovascular:      Rate and Rhythm: Normal rate and regular rhythm.      Heart sounds: Normal heart sounds. No murmur heard.  Pulmonary:      Effort: Pulmonary effort is normal. No respiratory distress.      Breath sounds: Normal breath sounds. No wheezing.   Musculoskeletal:      Right lower leg: No edema.      Left lower leg: No edema.   Skin:     General: Skin is warm and dry.      Findings: No rash.   Neurological:      General: No focal deficit present.      Mental Status: She is alert and oriented to person, place, and time.   Psychiatric:         Mood and Affect: Mood normal.         Behavior: Behavior normal.         Lab Results   Component Value Date    WBC 8.66 08/15/2024    HGB 13.4 08/15/2024    HCT 42.0 08/15/2024     08/15/2024    CHOL 126 08/15/2024    TRIG 75 08/15/2024    HDL 47 08/15/2024    ALT 13 08/15/2024    AST 16 08/15/2024     08/15/2024    K 4.2 08/15/2024     08/15/2024    CREATININE 1.0 08/15/2024    BUN 22 08/15/2024    CO2 29 08/15/2024    TSH 1.539 08/15/2024    HGBA1C 4.9 08/15/2024     The ASCVD Risk  "score (Omar GEGIER, et al., 2019) failed to calculate for the following reasons:    The valid total cholesterol range is 130 to 320 mg/dL  Visit Vitals  /70 (BP Location: Left arm, Patient Position: Sitting)   Pulse 79   Resp 14   Ht 5' 4" (1.626 m)   Wt 73.3 kg (161 lb 8 oz)   SpO2 96%   BMI 27.72 kg/m²      Assessment:       1. Encounter for screening mammogram for malignant neoplasm of breast    2. Unspecified disturbances of skin sensation        Plan:       1. Encounter for screening mammogram for malignant neoplasm of breast  -     Mammo Digital Screening Bilat w/ [facility]; Future; Expected date: 03/05/2025    2. Unspecified disturbances of skin sensation  Continue amitriptyline- can increase dosing up to 50 mg nightly  Patient to notify clinic in 10-14 days of tolerance so rx can be changed to reflect this if tolerating change and experiencing symptom resolution.      Follow up in about 3 months (around 4/28/2025).      Future Appointments       Date Provider Specialty Appt Notes    4/22/2025 Janet Aggarwal NP Family Medicine 3 month follow up             "

## 2025-03-07 DIAGNOSIS — M79.605 PAIN OF LEFT LOWER EXTREMITY: ICD-10-CM

## 2025-03-07 RX ORDER — GABAPENTIN 300 MG/1
300 CAPSULE ORAL NIGHTLY
Qty: 90 CAPSULE | Refills: 1 | Status: SHIPPED | OUTPATIENT
Start: 2025-03-07

## 2025-03-07 NOTE — TELEPHONE ENCOUNTER
Refill Routing Note   Medication(s) are not appropriate for processing by [facility] for the following reason(s):        Outside of protocol    [facility] action(s):  Route             Appointments  past 12m or future 3m with PCP    Date Provider   Last Visit   1/28/2025 Janet Aggarwal NP   Next Visit   4/22/2025 Janet Aggarwal NP   ED visits in past 90 days: 0        Note composed:3:13 PM 03/07/2025

## 2025-03-11 ENCOUNTER — RESULTS FOLLOW-UP (OUTPATIENT)
Dept: FAMILY MEDICINE | Facility: CLINIC | Age: 70
End: 2025-03-11

## 2025-03-11 ENCOUNTER — HOSPITAL ENCOUNTER (OUTPATIENT)
Dept: RADIOLOGY | Facility: HOSPITAL | Age: 70
Discharge: HOME | End: 2025-03-11
Attending: NURSE PRACTITIONER
Payer: MEDICARE

## 2025-03-11 DIAGNOSIS — I10 WHITE COAT SYNDROME WITH DIAGNOSIS OF HYPERTENSION: ICD-10-CM

## 2025-03-11 PROCEDURE — 77063 BREAST TOMOSYNTHESIS BI: CPT | Mod: TC,PO

## 2025-03-11 PROCEDURE — 77063 BREAST TOMOSYNTHESIS BI: CPT | Mod: 26,,, | Performed by: RADIOLOGY

## 2025-03-11 PROCEDURE — 77067 SCR MAMMO BI INCL CAD: CPT | Mod: 26,,, | Performed by: RADIOLOGY

## 2025-03-11 RX ORDER — AMLODIPINE BESYLATE 10 MG/1
10 TABLET ORAL
Qty: 90 TABLET | Refills: 3 | Status: SHIPPED | OUTPATIENT
Start: 2025-03-11

## 2025-03-11 RX ORDER — VALSARTAN AND HYDROCHLOROTHIAZIDE 320; 25 MG/1; MG/1
1 TABLET, FILM COATED ORAL
Qty: 90 TABLET | Refills: 3 | Status: SHIPPED | OUTPATIENT
Start: 2025-03-11

## 2025-03-11 NOTE — TELEPHONE ENCOUNTER
Refill Routing Note   Medication(s) are not appropriate for processing by [facility] for the following reason(s):        Non-participating provider    [facility] action(s):  Route               Appointments  past 12m or future 3m with PCP    Date Provider   Last Visit   1/28/2025 Janet Aggarwal NP   Next Visit   4/22/2025 Janet Aggarwal NP   ED visits in past 90 days: 0        Note composed:2:23 PM 03/11/2025

## 2025-03-17 DIAGNOSIS — I10 WHITE COAT SYNDROME WITH DIAGNOSIS OF HYPERTENSION: ICD-10-CM

## 2025-03-17 DIAGNOSIS — G47.00 INSOMNIA, UNSPECIFIED TYPE: ICD-10-CM

## 2025-03-18 RX ORDER — AMITRIPTYLINE HYDROCHLORIDE 10 MG/1
10 TABLET, FILM COATED ORAL NIGHTLY PRN
Qty: 90 TABLET | Refills: 1 | Status: SHIPPED | OUTPATIENT
Start: 2025-03-18

## 2025-03-18 RX ORDER — AMITRIPTYLINE HYDROCHLORIDE 25 MG/1
25 TABLET, FILM COATED ORAL NIGHTLY PRN
Qty: 90 TABLET | Refills: 1 | Status: SHIPPED | OUTPATIENT
Start: 2025-03-18

## 2025-03-18 RX ORDER — AMLODIPINE BESYLATE 10 MG/1
10 TABLET ORAL DAILY
Qty: 90 TABLET | Refills: 3 | Status: SHIPPED | OUTPATIENT
Start: 2025-03-18

## 2025-03-18 NOTE — TELEPHONE ENCOUNTER
Refill Routing Note   Medication(s) are not appropriate for processing by [facility] for the following reason(s):        Non-participating provider    [facility] action(s):  Route               Appointments  past 12m or future 3m with PCP    Date Provider   Last Visit   1/28/2025 Janet Aggarwal NP   Next Visit   3/17/2025 Janet Aggarwal NP   ED visits in past 90 days: 0        Note composed:6:15 AM 03/18/2025

## 2025-03-18 NOTE — TELEPHONE ENCOUNTER
Refill Routing Note   Medication(s) are not appropriate for processing by [facility] for the following reason(s):        Non-participating provider    [facility] action(s):  Route               Appointments  past 12m or future 3m with PCP    Date Provider   Last Visit   1/28/2025 Janet Aggarwal NP   Next Visit   3/17/2025 Janet Aggarwal NP   ED visits in past 90 days: 0        Note composed:6:17 AM 03/18/2025

## 2025-03-18 NOTE — TELEPHONE ENCOUNTER
Refill Routing Note   Medication(s) are not appropriate for processing by [facility] for the following reason(s):        Non-participating provider    [facility] action(s):  Route               Appointments  past 12m or future 3m with PCP    Date Provider   Last Visit   1/28/2025 Janet Aggarwal NP   Next Visit   4/22/2025 Janet Aggarwal NP   ED visits in past 90 days: 0        Note composed:6:18 AM 03/18/2025

## 2025-05-06 ENCOUNTER — LAB VISIT (OUTPATIENT)
Dept: LAB | Facility: HOSPITAL | Age: 70
End: 2025-05-06
Attending: NURSE PRACTITIONER
Payer: MEDICARE

## 2025-05-06 ENCOUNTER — OFFICE VISIT (OUTPATIENT)
Dept: FAMILY MEDICINE | Facility: CLINIC | Age: 70
End: 2025-05-06
Payer: MEDICARE

## 2025-05-06 VITALS
SYSTOLIC BLOOD PRESSURE: 120 MMHG | HEIGHT: 64 IN | BODY MASS INDEX: 27.98 KG/M2 | RESPIRATION RATE: 12 BRPM | HEART RATE: 71 BPM | OXYGEN SATURATION: 98 % | DIASTOLIC BLOOD PRESSURE: 80 MMHG | WEIGHT: 163.88 LBS

## 2025-05-06 DIAGNOSIS — Z13.6 ENCOUNTER FOR LIPID SCREENING FOR CARDIOVASCULAR DISEASE: ICD-10-CM

## 2025-05-06 DIAGNOSIS — Z00.00 ENCOUNTER FOR WELLNESS EXAMINATION IN ADULT: ICD-10-CM

## 2025-05-06 DIAGNOSIS — Z00.00 ENCOUNTER FOR WELLNESS EXAMINATION IN ADULT: Primary | ICD-10-CM

## 2025-05-06 DIAGNOSIS — Z13.220 ENCOUNTER FOR LIPID SCREENING FOR CARDIOVASCULAR DISEASE: ICD-10-CM

## 2025-05-06 LAB
ALBUMIN SERPL BCP-MCNC: 4.1 G/DL (ref 3.5–5.2)
ALP SERPL-CCNC: 71 UNIT/L (ref 40–150)
ALT SERPL W/O P-5'-P-CCNC: 13 UNIT/L (ref 10–44)
ANION GAP (OHS): 7 MMOL/L (ref 8–16)
AST SERPL-CCNC: 17 UNIT/L (ref 11–45)
BILIRUB SERPL-MCNC: 0.5 MG/DL (ref 0.1–1)
BUN SERPL-MCNC: 13 MG/DL (ref 8–23)
CALCIUM SERPL-MCNC: 10.1 MG/DL (ref 8.7–10.5)
CHLORIDE SERPL-SCNC: 103 MMOL/L (ref 95–110)
CHOLEST SERPL-MCNC: 124 MG/DL (ref 120–199)
CHOLEST/HDLC SERPL: 2.5 {RATIO} (ref 2–5)
CO2 SERPL-SCNC: 29 MMOL/L (ref 23–29)
CREAT SERPL-MCNC: 0.9 MG/DL (ref 0.5–1.4)
GFR SERPLBLD CREATININE-BSD FMLA CKD-EPI: >60 ML/MIN/1.73/M2
GLUCOSE SERPL-MCNC: 74 MG/DL (ref 70–110)
HDLC SERPL-MCNC: 49 MG/DL (ref 40–75)
HDLC SERPL: 39.5 % (ref 20–50)
LDLC SERPL CALC-MCNC: 63.2 MG/DL (ref 63–159)
NONHDLC SERPL-MCNC: 75 MG/DL
POTASSIUM SERPL-SCNC: 4.3 MMOL/L (ref 3.5–5.1)
PROT SERPL-MCNC: 7.3 GM/DL (ref 6–8.4)
SODIUM SERPL-SCNC: 139 MMOL/L (ref 136–145)
TRIGL SERPL-MCNC: 59 MG/DL (ref 30–150)

## 2025-05-06 PROCEDURE — 99999 PR PBB SHADOW E&M-EST. PATIENT-LVL IV: CPT | Mod: PBBFAC,,, | Performed by: NURSE PRACTITIONER

## 2025-05-06 PROCEDURE — 80053 COMPREHEN METABOLIC PANEL: CPT

## 2025-05-06 PROCEDURE — 99214 OFFICE O/P EST MOD 30 MIN: CPT | Mod: PBBFAC,PN | Performed by: NURSE PRACTITIONER

## 2025-05-06 PROCEDURE — 80061 LIPID PANEL: CPT

## 2025-05-06 PROCEDURE — 36415 COLL VENOUS BLD VENIPUNCTURE: CPT

## 2025-05-06 PROCEDURE — 99397 PER PM REEVAL EST PAT 65+ YR: CPT | Mod: S$PBB,GZ,, | Performed by: NURSE PRACTITIONER

## 2025-05-06 NOTE — PROGRESS NOTES
To Subjective:       Patient ID: Kayla Piper is a 69 y.o. female.    Chief Complaint: Annual Exam (Wellness)    Kayla Piper presents to the clinic today for Wellness      She has been under the care of the following  Vascular: - f/u yearly for history of Right Carotid Bruit  Gastroenterology: Dr. Herron- colonoscopy 2013- reports that it was normal.   Orthopedics: Dr. Kearney  Gynecology: Dr. Tracy Keene- [facility]      Patient Active Problem List  Diagnosis  ·           Rhinitis  ·           Cervical disc displacement  ·           Cervical radiculopathy  ·           Chronic hoarseness  ·           ETD (eustachian tube dysfunction)  ·           Laryngopharyngeal reflux (LPR)  ·           Depression  ·           Hypertension  ·           Insomnia  ·           Gastroesophageal reflux disease without esophagitis  ·           Carotid stenosis, right     Fasting for blood work today  Denies any new issues, concerns or complaints            Review of Systems    Problem List[1]    Objective:      Physical Exam  Constitutional:       General: She is not in acute distress.     Appearance: Normal appearance.   Eyes:      Conjunctiva/sclera: Conjunctivae normal.   Cardiovascular:      Rate and Rhythm: Normal rate and regular rhythm.      Heart sounds: Normal heart sounds. No murmur heard.  Pulmonary:      Effort: Pulmonary effort is normal. No respiratory distress.      Breath sounds: Normal breath sounds. No wheezing.   Musculoskeletal:      Right lower leg: No edema.      Left lower leg: No edema.   Skin:     General: Skin is warm and dry.      Findings: No rash.   Neurological:      General: No focal deficit present.      Mental Status: She is alert and oriented to person, place, and time.   Psychiatric:         Mood and Affect: Mood normal.         Behavior: Behavior normal.         Lab Results   Component Value Date    WBC 8.66 08/15/2024    HGB 13.4 08/15/2024    HCT 42.0 08/15/2024     " 08/15/2024    CHOL 126 08/15/2024    TRIG 75 08/15/2024    HDL 47 08/15/2024    ALT 13 08/15/2024    AST 16 08/15/2024     08/15/2024    K 4.2 08/15/2024     08/15/2024    CREATININE 1.0 08/15/2024    BUN 22 08/15/2024    CO2 29 08/15/2024    TSH 1.539 08/15/2024    HGBA1C 4.9 08/15/2024     The ASCVD Risk score (Omar GEIGER, et al., 2019) failed to calculate for the following reasons:    The valid total cholesterol range is 130 to 320 mg/dL  Visit Vitals  /80 (BP Location: Left arm, Patient Position: Sitting)   Pulse 71   Resp 12   Ht 5' 4" (1.626 m)   Wt 74.3 kg (163 lb 14.4 oz)   SpO2 98%   BMI 28.13 kg/m²      Assessment:       1. Encounter for wellness examination in adult    2. Encounter for lipid screening for cardiovascular disease        Plan:       1. Encounter for wellness examination in adult  -     Comprehensive Metabolic Panel; Future; Expected date: 05/06/2025  -     Lipid Panel; Future; Expected date: 05/06/2025  Obtain fasting blood work for review.   2. Encounter for lipid screening for cardiovascular disease  -     Lipid Panel; Future; Expected date: 05/06/2025     Continue current medication regimen as prescribed  Rtc 6 months- needs [facility] appointment with MD- voiced understanding of reason for visit   Follow up in about 6 months (around 11/6/2025).      Future Appointments       Date Provider Specialty Appt Notes    5/6/2025  Lab labs    11/4/2025 Lynnette Sagastume MD Family Medicine [facility]                  [1]   Patient Active Problem List  Diagnosis    Rhinitis    Cervical disc displacement    Cervical radiculopathy    Chronic hoarseness    ETD (eustachian tube dysfunction)    Laryngopharyngeal reflux (LPR)    Depressive disorder    Hypertension    Primary insomnia    Gastroesophageal reflux disease without esophagitis    Carotid stenosis, right    Impaired functional mobility and activity tolerance    Status post total prosthetic replacement of knee joint " using cement, right    Presence of right artificial knee joint    Patellar tendinitis, right knee    Chronic pain of right knee

## 2025-05-08 ENCOUNTER — RESULTS FOLLOW-UP (OUTPATIENT)
Dept: FAMILY MEDICINE | Facility: CLINIC | Age: 70
End: 2025-05-08

## 2025-05-30 DIAGNOSIS — G47.00 INSOMNIA, UNSPECIFIED TYPE: ICD-10-CM

## 2025-05-31 RX ORDER — AMITRIPTYLINE HYDROCHLORIDE 10 MG/1
10 TABLET, FILM COATED ORAL DAILY
Qty: 90 TABLET | Refills: 3 | Status: SHIPPED | OUTPATIENT
Start: 2025-05-31

## 2025-06-16 ENCOUNTER — TELEPHONE (OUTPATIENT)
Dept: FAMILY MEDICINE | Facility: CLINIC | Age: 70
End: 2025-06-16
Payer: MEDICARE

## 2025-06-16 DIAGNOSIS — J41.0 SMOKERS' COUGH: ICD-10-CM

## 2025-06-16 NOTE — TELEPHONE ENCOUNTER
Spoke with pt. Pt does not need RX for albuterol as requested by [facility].   Encouraged pt to contact office if she needs refills. Pt voiced understanding

## 2025-06-16 NOTE — TELEPHONE ENCOUNTER
Copied from CRM #[id number]. Topic: Medications - Medication Refill  >> Jun 16, 2025 11:46 AM Teena wrote:  Type:  RX Refill Request    Who Called: [facility]   [facility]ill [facility] Rx[facility]   RX Name and Strength:  albuterol (PROVENTIL/VENTOLIN HFA) 90 mcg/actuation inhaler   How is the patient currently taking it? (ex. 1XDay):  -  Is this a 30 day or 90 day RX:  90  Preferred Pharmacy with phone number:        [facility] - [address] [address]  Phone: [phone] Fax: [phone]      Local or Mail Order:  Mail   Ordering Provider:  Niki Waters Call Back Number:  [phone]  Additional Information:  Pharmacy requesting refill

## 2025-08-26 ENCOUNTER — OFFICE VISIT (OUTPATIENT)
Dept: FAMILY MEDICINE | Facility: CLINIC | Age: 70
End: 2025-08-26
Payer: MEDICARE

## 2025-08-26 VITALS
HEIGHT: 64 IN | SYSTOLIC BLOOD PRESSURE: 132 MMHG | DIASTOLIC BLOOD PRESSURE: 84 MMHG | WEIGHT: 166.81 LBS | HEART RATE: 76 BPM | BODY MASS INDEX: 28.48 KG/M2 | OXYGEN SATURATION: 95 % | RESPIRATION RATE: 14 BRPM

## 2025-08-26 DIAGNOSIS — Z13.220 ENCOUNTER FOR LIPID SCREENING FOR CARDIOVASCULAR DISEASE: ICD-10-CM

## 2025-08-26 DIAGNOSIS — M13.0 POLYARTHRITIS, UNSPECIFIED: ICD-10-CM

## 2025-08-26 DIAGNOSIS — M79.605 PAIN OF LEFT LOWER EXTREMITY: ICD-10-CM

## 2025-08-26 DIAGNOSIS — I10 HYPERTENSION, UNSPECIFIED TYPE: Primary | ICD-10-CM

## 2025-08-26 DIAGNOSIS — Z87.39 HX OF GOUT: ICD-10-CM

## 2025-08-26 DIAGNOSIS — G47.00 INSOMNIA, UNSPECIFIED TYPE: ICD-10-CM

## 2025-08-26 DIAGNOSIS — Z13.6 ENCOUNTER FOR LIPID SCREENING FOR CARDIOVASCULAR DISEASE: ICD-10-CM

## 2025-08-26 PROCEDURE — 99214 OFFICE O/P EST MOD 30 MIN: CPT | Mod: PBBFAC,PN | Performed by: NURSE PRACTITIONER

## 2025-08-26 PROCEDURE — 99999 PR PBB SHADOW E&M-EST. PATIENT-LVL IV: CPT | Mod: PBBFAC,,, | Performed by: NURSE PRACTITIONER

## 2025-08-26 PROCEDURE — 99213 OFFICE O/P EST LOW 20 MIN: CPT | Mod: S$PBB,,, | Performed by: NURSE PRACTITIONER

## 2025-08-26 RX ORDER — AMITRIPTYLINE HYDROCHLORIDE 25 MG/1
25 TABLET, FILM COATED ORAL NIGHTLY PRN
Qty: 90 TABLET | Refills: 1 | Status: SHIPPED | OUTPATIENT
Start: 2025-08-26

## 2025-08-26 RX ORDER — GABAPENTIN 300 MG/1
300 CAPSULE ORAL NIGHTLY
Qty: 90 CAPSULE | Refills: 1 | Status: SHIPPED | OUTPATIENT
Start: 2025-08-26

## (undated) DEVICE — TOGA FLYTE PEEL AWAY XLARGE

## (undated) DEVICE — ALCOHOL 70% ANTISEPTIC ISO 4OZ

## (undated) DEVICE — APPLICATOR CHLORAPREP ORN 26ML

## (undated) DEVICE — WRAP PROTECTIVE LEG POS STRL

## (undated) DEVICE — KIT CHECKPOINT MAKO

## (undated) DEVICE — CATH URETHRAL RED RUBBER 18FR

## (undated) DEVICE — LINER SUCTION 3000CC

## (undated) DEVICE — SUT STRATAFIX PDS 1 CTX 18IN

## (undated) DEVICE — INTERPULSE SET

## (undated) DEVICE — BNDG COFLEX FOAM LF2 ST 6X5YD

## (undated) DEVICE — KIT TRIATHLON CR TIB PREP SZ3

## (undated) DEVICE — PACK CUSTOM UNIV BASIN SLI

## (undated) DEVICE — DRAPE ORTH SPLIT 77X108IN

## (undated) DEVICE — PIN BONE 3.2X110MM
Type: IMPLANTABLE DEVICE | Site: KNEE | Status: NON-FUNCTIONAL
Removed: 2023-09-26

## (undated) DEVICE — UNDERGLOVES BIOGEL PI SIZE 8

## (undated) DEVICE — ELECTRODE REM PLYHSV RETURN 9

## (undated) DEVICE — MANIFOLD 4 PORT

## (undated) DEVICE — DRAPE STERI U-SHAPED 47X51IN

## (undated) DEVICE — SUT 2/0 18IN COATED VICRYL

## (undated) DEVICE — SET DECANTER MEDICHOICE

## (undated) DEVICE — NDL SPINAL 18GX3.5 SPINOCAN

## (undated) DEVICE — PACK SIRUS BASIC V SURG STRL

## (undated) DEVICE — COVER TABLE 44X90 STERILE

## (undated) DEVICE — KIT DRAPE RIO ONE PIECE W/POCK

## (undated) DEVICE — PACK EXTREMITY ORTHOMAX

## (undated) DEVICE — DRAPE STERI INSTRUMENT 1018

## (undated) DEVICE — GOWN TOGA SYS PEELWY ZIP 2 XL

## (undated) DEVICE — BLADE MAKO STANDARD

## (undated) DEVICE — KIT VIZADISC KNEE TRACKING

## (undated) DEVICE — GLOVE SURG ULTRA TOUCH 7.5

## (undated) DEVICE — PAD ABDOMINAL STERILE 8X10IN

## (undated) DEVICE — TOURNIQUET SB QC DP 34X4IN

## (undated) DEVICE — KIT TRIATHLON CR FEM PREP SZ3

## (undated) DEVICE — DRESSING GAUZE OIL EMUL 3X8

## (undated) DEVICE — STRAP OR TABLE 5IN X 72IN

## (undated) DEVICE — PIN BONE 4 X 140MM STERILE
Type: IMPLANTABLE DEVICE | Site: KNEE | Status: NON-FUNCTIONAL
Removed: 2023-09-26

## (undated) DEVICE — SOL NACL IRR 3000ML

## (undated) DEVICE — WRAP KNEE ACCU THERM GEL PACK

## (undated) DEVICE — BLADE SURG CARBON STEEL SZ11

## (undated) DEVICE — SLEEVE SCD EXPRESS KNEE MEDIUM

## (undated) DEVICE — DRESSING AQUACEL AG ADV 3.5X12

## (undated) DEVICE — SYR 50CC LL

## (undated) DEVICE — UNDERGLOVES BIOGEL PI SIZE 7.5

## (undated) DEVICE — BANDAGE MATRIX HK LOOP 6IN 5YD

## (undated) DEVICE — SUT STRATAFIX SPRL PS-2 3-0

## (undated) DEVICE — BLADE SURG CARBON STEEL #10

## (undated) DEVICE — DRAPE INCISE IOBAN 2 23X17IN

## (undated) DEVICE — BLADE SAG DUAL 18MMX1.27MMX90M

## (undated) DEVICE — PADDING CAST SPECIALIST 6X4YD

## (undated) DEVICE — YANKAUER FLEX NO VENT HI CAP

## (undated) DEVICE — SOL NACL IRR 1000ML BTL

## (undated) DEVICE — BANDAGE ESMARK ELASTIC ST 6X9

## (undated) DEVICE — ADHESIVE DERMABOND ADVANCED

## (undated) DEVICE — GLOVE SURG ULTRA TOUCH 8

## (undated) DEVICE — TOWEL OR DISP STRL BLUE 4/PK

## (undated) DEVICE — SCRUB 10% POVIDONE IODINE 4OZ

## (undated) DEVICE — DRAPE THREE-QTR REINF 53X77IN

## (undated) DEVICE — KIT TRIATHLON TIBIAL SIZER

## (undated) DEVICE — SPONGE BULKEE II ABSRB 6X6.75

## (undated) DEVICE — PADDING WYTEX UNDRCST 6INX4YD

## (undated) DEVICE — BLADE TONGUE DEPRESSOR STRL